# Patient Record
Sex: FEMALE | Race: WHITE | Employment: PART TIME | URBAN - NONMETROPOLITAN AREA
[De-identification: names, ages, dates, MRNs, and addresses within clinical notes are randomized per-mention and may not be internally consistent; named-entity substitution may affect disease eponyms.]

---

## 2017-01-09 DIAGNOSIS — I10 ESSENTIAL HYPERTENSION: ICD-10-CM

## 2017-01-09 RX ORDER — HYDROCHLOROTHIAZIDE 12.5 MG/1
12.5 TABLET ORAL DAILY
Qty: 90 TABLET | Refills: 3 | Status: SHIPPED | OUTPATIENT
Start: 2017-01-09 | End: 2017-12-29 | Stop reason: SDUPTHER

## 2017-01-09 RX ORDER — METOPROLOL SUCCINATE 50 MG/1
50 TABLET, EXTENDED RELEASE ORAL DAILY
Qty: 90 TABLET | Refills: 3 | Status: SHIPPED | OUTPATIENT
Start: 2017-01-09 | End: 2017-01-10 | Stop reason: SDUPTHER

## 2017-01-10 DIAGNOSIS — I10 ESSENTIAL HYPERTENSION: ICD-10-CM

## 2017-01-10 RX ORDER — METOPROLOL SUCCINATE 50 MG/1
50 TABLET, EXTENDED RELEASE ORAL DAILY
Qty: 90 TABLET | Refills: 3 | Status: SHIPPED | OUTPATIENT
Start: 2017-01-10 | End: 2017-12-29 | Stop reason: SDUPTHER

## 2017-01-11 DIAGNOSIS — E03.9 ACQUIRED HYPOTHYROIDISM: ICD-10-CM

## 2017-01-11 RX ORDER — LEVOTHYROXINE SODIUM 0.05 MG/1
TABLET ORAL
Qty: 90 TABLET | Refills: 3 | Status: SHIPPED | OUTPATIENT
Start: 2017-01-11 | End: 2017-01-23 | Stop reason: SDUPTHER

## 2017-01-23 DIAGNOSIS — E03.9 ACQUIRED HYPOTHYROIDISM: ICD-10-CM

## 2017-01-23 RX ORDER — LEVOTHYROXINE SODIUM 0.05 MG/1
TABLET ORAL
Qty: 90 TABLET | Refills: 3 | Status: SHIPPED | OUTPATIENT
Start: 2017-01-23 | End: 2017-12-29 | Stop reason: SDUPTHER

## 2017-01-30 ENCOUNTER — OFFICE VISIT (OUTPATIENT)
Dept: PRIMARY CARE CLINIC | Age: 22
End: 2017-01-30
Payer: COMMERCIAL

## 2017-01-30 VITALS
SYSTOLIC BLOOD PRESSURE: 136 MMHG | DIASTOLIC BLOOD PRESSURE: 82 MMHG | BODY MASS INDEX: 43.71 KG/M2 | HEIGHT: 64 IN | OXYGEN SATURATION: 98 % | TEMPERATURE: 97 F | WEIGHT: 256 LBS | HEART RATE: 70 BPM

## 2017-01-30 DIAGNOSIS — R50.9 FEVER, UNSPECIFIED FEVER CAUSE: ICD-10-CM

## 2017-01-30 DIAGNOSIS — J01.10 ACUTE NON-RECURRENT FRONTAL SINUSITIS: Primary | ICD-10-CM

## 2017-01-30 LAB — S PYO AG THROAT QL: NORMAL

## 2017-01-30 PROCEDURE — 99213 OFFICE O/P EST LOW 20 MIN: CPT | Performed by: NURSE PRACTITIONER

## 2017-01-30 PROCEDURE — 87880 STREP A ASSAY W/OPTIC: CPT | Performed by: NURSE PRACTITIONER

## 2017-01-30 RX ORDER — CEFDINIR 300 MG/1
300 CAPSULE ORAL 2 TIMES DAILY
Qty: 20 CAPSULE | Refills: 0 | Status: SHIPPED | OUTPATIENT
Start: 2017-01-30 | End: 2017-02-09

## 2017-01-30 ASSESSMENT — ENCOUNTER SYMPTOMS
SORE THROAT: 1
RHINORRHEA: 1
COUGH: 1
CONSTIPATION: 0
EYE REDNESS: 0
DIARRHEA: 0
BLOOD IN STOOL: 0
TROUBLE SWALLOWING: 0
WHEEZING: 0
EYE DISCHARGE: 0
ABDOMINAL PAIN: 0

## 2017-02-27 ENCOUNTER — OFFICE VISIT (OUTPATIENT)
Dept: PRIMARY CARE CLINIC | Age: 22
End: 2017-02-27
Payer: COMMERCIAL

## 2017-02-27 VITALS
OXYGEN SATURATION: 98 % | WEIGHT: 249.3 LBS | BODY MASS INDEX: 42.56 KG/M2 | HEIGHT: 64 IN | TEMPERATURE: 97.3 F | HEART RATE: 92 BPM | DIASTOLIC BLOOD PRESSURE: 90 MMHG | SYSTOLIC BLOOD PRESSURE: 138 MMHG

## 2017-02-27 DIAGNOSIS — J01.00 ACUTE NON-RECURRENT MAXILLARY SINUSITIS: ICD-10-CM

## 2017-02-27 DIAGNOSIS — J02.9 SORE THROAT: Primary | ICD-10-CM

## 2017-02-27 LAB — S PYO AG THROAT QL: NORMAL

## 2017-02-27 PROCEDURE — 99213 OFFICE O/P EST LOW 20 MIN: CPT | Performed by: PEDIATRICS

## 2017-02-27 PROCEDURE — 87880 STREP A ASSAY W/OPTIC: CPT | Performed by: PEDIATRICS

## 2017-02-27 RX ORDER — CEFDINIR 300 MG/1
300 CAPSULE ORAL 2 TIMES DAILY
Qty: 20 CAPSULE | Refills: 0 | Status: SHIPPED | OUTPATIENT
Start: 2017-02-27 | End: 2017-03-09

## 2017-02-27 RX ORDER — PSEUDOEPHEDRINE HCL 120 MG/1
120 TABLET, FILM COATED, EXTENDED RELEASE ORAL EVERY 12 HOURS
COMMUNITY
End: 2019-03-14 | Stop reason: ALTCHOICE

## 2017-02-27 ASSESSMENT — ENCOUNTER SYMPTOMS
CONSTIPATION: 0
EYE DISCHARGE: 0
EYE PAIN: 0
VOICE CHANGE: 0
BACK PAIN: 0
DIARRHEA: 0
COUGH: 1
ABDOMINAL PAIN: 0
VOMITING: 0
WHEEZING: 0
SORE THROAT: 1
NAUSEA: 0
SINUS PRESSURE: 0
SHORTNESS OF BREATH: 0

## 2017-05-22 DIAGNOSIS — Z30.8 ENCOUNTER FOR OTHER CONTRACEPTIVE MANAGEMENT: ICD-10-CM

## 2017-05-24 DIAGNOSIS — J30.9 ALLERGIC RHINITIS: ICD-10-CM

## 2017-05-24 RX ORDER — LORATADINE 10 MG/1
10 TABLET ORAL DAILY
Qty: 30 TABLET | Refills: 3 | Status: SHIPPED | OUTPATIENT
Start: 2017-05-24

## 2017-10-23 RX ORDER — ATORVASTATIN CALCIUM 40 MG/1
40 TABLET, FILM COATED ORAL NIGHTLY
Qty: 30 TABLET | Refills: 11 | Status: SHIPPED | OUTPATIENT
Start: 2017-10-23 | End: 2017-12-29 | Stop reason: SDUPTHER

## 2017-10-23 NOTE — TELEPHONE ENCOUNTER
Received request from pt for refill on the following medication. Pt was last seen in the office on 2/27/17 and has no follow up appointment scheduled. Will send request to physician for authorization.        Requested Prescriptions     Pending Prescriptions Disp Refills    atorvastatin (LIPITOR) 40 MG tablet [Pharmacy Med Name: ATORVASTATIN 40MG   TAB] 30 tablet 11     Sig: Take 1 tablet by mouth nightly

## 2017-11-16 DIAGNOSIS — Z30.8 ENCOUNTER FOR OTHER CONTRACEPTIVE MANAGEMENT: ICD-10-CM

## 2017-12-29 ENCOUNTER — OFFICE VISIT (OUTPATIENT)
Dept: PRIMARY CARE CLINIC | Age: 22
End: 2017-12-29
Payer: COMMERCIAL

## 2017-12-29 VITALS
HEART RATE: 89 BPM | DIASTOLIC BLOOD PRESSURE: 100 MMHG | HEIGHT: 64 IN | TEMPERATURE: 97.8 F | WEIGHT: 255 LBS | SYSTOLIC BLOOD PRESSURE: 150 MMHG | OXYGEN SATURATION: 98 % | BODY MASS INDEX: 43.54 KG/M2

## 2017-12-29 DIAGNOSIS — I10 ESSENTIAL HYPERTENSION: ICD-10-CM

## 2017-12-29 DIAGNOSIS — J02.9 PHARYNGITIS, UNSPECIFIED ETIOLOGY: ICD-10-CM

## 2017-12-29 DIAGNOSIS — Z00.00 ROUTINE GENERAL MEDICAL EXAMINATION AT A HEALTH CARE FACILITY: Primary | ICD-10-CM

## 2017-12-29 DIAGNOSIS — J02.0 STREP THROAT: ICD-10-CM

## 2017-12-29 DIAGNOSIS — E03.9 ACQUIRED HYPOTHYROIDISM: ICD-10-CM

## 2017-12-29 DIAGNOSIS — M79.672 FOOT PAIN, LEFT: ICD-10-CM

## 2017-12-29 DIAGNOSIS — Z30.8 ENCOUNTER FOR OTHER CONTRACEPTIVE MANAGEMENT: ICD-10-CM

## 2017-12-29 DIAGNOSIS — E78.2 MIXED HYPERLIPIDEMIA: ICD-10-CM

## 2017-12-29 LAB — S PYO AG THROAT QL: POSITIVE

## 2017-12-29 PROCEDURE — 87880 STREP A ASSAY W/OPTIC: CPT | Performed by: NURSE PRACTITIONER

## 2017-12-29 PROCEDURE — 99395 PREV VISIT EST AGE 18-39: CPT | Performed by: NURSE PRACTITIONER

## 2017-12-29 RX ORDER — MELOXICAM 15 MG/1
15 TABLET ORAL DAILY
Qty: 90 TABLET | Refills: 3 | Status: SHIPPED | OUTPATIENT
Start: 2017-12-29 | End: 2018-12-13 | Stop reason: SDUPTHER

## 2017-12-29 RX ORDER — ATORVASTATIN CALCIUM 40 MG/1
40 TABLET, FILM COATED ORAL NIGHTLY
Qty: 30 TABLET | Refills: 11 | Status: SHIPPED | OUTPATIENT
Start: 2017-12-29 | End: 2018-12-13 | Stop reason: SDUPTHER

## 2017-12-29 RX ORDER — HYDROCHLOROTHIAZIDE 12.5 MG/1
12.5 TABLET ORAL DAILY
Qty: 90 TABLET | Refills: 3 | Status: SHIPPED | OUTPATIENT
Start: 2017-12-29 | End: 2018-12-13 | Stop reason: SDUPTHER

## 2017-12-29 RX ORDER — AMOXICILLIN 500 MG/1
500 CAPSULE ORAL 3 TIMES DAILY
Qty: 30 CAPSULE | Refills: 0 | Status: SHIPPED | OUTPATIENT
Start: 2017-12-29 | End: 2018-01-08

## 2017-12-29 RX ORDER — METOPROLOL SUCCINATE 50 MG/1
50 TABLET, EXTENDED RELEASE ORAL DAILY
Qty: 90 TABLET | Refills: 3 | Status: SHIPPED | OUTPATIENT
Start: 2017-12-29 | End: 2018-11-21 | Stop reason: SDUPTHER

## 2017-12-29 RX ORDER — LEVOTHYROXINE SODIUM 0.05 MG/1
TABLET ORAL
Qty: 90 TABLET | Refills: 3 | Status: SHIPPED | OUTPATIENT
Start: 2017-12-29 | End: 2018-12-13 | Stop reason: SDUPTHER

## 2017-12-29 ASSESSMENT — ENCOUNTER SYMPTOMS
COUGH: 1
CONSTIPATION: 0
SHORTNESS OF BREATH: 0
SINUS PRESSURE: 1
RHINORRHEA: 0
DIARRHEA: 0
VOMITING: 0
SORE THROAT: 1
EYE REDNESS: 0

## 2017-12-29 NOTE — PROGRESS NOTES
complaints   States that she recently had sinus infection and was treated with abx from [facility] (finished Sunday, Augmentin 875)   Yellow/green snot from nose   Tylenol for fever-last dose this AM      height is 5' 4\" (1.626 m) and weight is 255 lb (115.7 kg). Her temporal temperature is 97.8 °F (36.6 °C). Her blood pressure is 150/100 (abnormal) and her pulse is 89. Her oxygen saturation is 98%. Body mass index is 43.77 kg/m². Results for orders placed or performed in visit on 02/27/17   POCT rapid strep A   Result Value Ref Range    Strep A Ag None Detected None Detected       I have reviewed the following with the Ms. Myranda Marquez   Lab Review   No visits with results within 6 Month(s) from this visit. Latest known visit with results is:   Office Visit on 02/27/2017   Component Date Value    Strep A Ag 02/27/2017 None Detected      Copies of these are in the chart. Prior to Visit Medications    Medication Sig Taking?  Authorizing Provider   norethindrone-ethinyl estradiol (PIRMELLA 7/7/7) 0.5/0.75/1-35 MG-MCG per tablet Take 1 tablet by mouth daily Yes JUAN Mills   atorvastatin (LIPITOR) 40 MG tablet Take 1 tablet by mouth nightly Yes JUAN Mills   levothyroxine (SYNTHROID) 50 MCG tablet TAKE ONE TABLET BY MOUTH ONCE DAILY Yes JUAN Mills   metoprolol succinate (TOPROL XL) 50 MG extended release tablet Take 1 tablet by mouth daily Yes JUAN Mills   hydrochlorothiazide (HYDRODIURIL) 12.5 MG tablet Take 1 tablet by mouth daily Yes JUAN Mills   meloxicam (MOBIC) 15 MG tablet Take 1 tablet by mouth daily Yes JUAN Mills   amoxicillin (AMOXIL) 500 MG capsule Take 1 capsule by mouth 3 times daily for 10 days Yes JUAN Mills   loratadine (EQ ALLERGY RELIEF) 10 MG tablet Take 1 tablet by mouth daily Yes JUAN Jackson   pseudoephedrine (SUDAFED 12 HR) 120 MG extended release tablet Take 120 mg by mouth every 12 hours Yes Historical Provider, MD adenopathy. Neurological: She is alert. Skin: Skin is warm and dry. No rash noted. No erythema. No pallor. Psychiatric: She has a normal mood and affect. Her behavior is normal. Judgment and thought content normal.   Vitals reviewed. ASSESSMENT      ICD-10-CM ICD-9-CM    1. Routine general medical examination at a health care facility Z00.00 V70.0 Lipid Panel      TSH without Reflex      T4, Free      CBC Auto Differential      Microalbumin / Creatinine Urine Ratio      Comprehensive Metabolic Panel   2. Encounter for other contraceptive management Z30.8 V25.8 norethindrone-ethinyl estradiol (31 Ruaddison SchneiderJolanta 7/7/7) 0.5/0.75/1-35 MG-MCG per tablet   3. Acquired hypothyroidism E03.9 244.9 levothyroxine (SYNTHROID) 50 MCG tablet      TSH without Reflex      T4, Free      CBC Auto Differential   4. Essential hypertension I10 401.9 metoprolol succinate (TOPROL XL) 50 MG extended release tablet      hydrochlorothiazide (HYDRODIURIL) 12.5 MG tablet      TSH without Reflex      T4, Free      CBC Auto Differential      Microalbumin / Creatinine Urine Ratio      Comprehensive Metabolic Panel   5. Foot pain, left M79.672 729.5 meloxicam (MOBIC) 15 MG tablet   6. Pharyngitis, unspecified etiology J02.9 462 POCT rapid strep A   7. Mixed hyperlipidemia E78.2 272.2 atorvastatin (LIPITOR) 40 MG tablet  (patient aware that this medication is tetragenic)      Lipid Panel      CBC Auto Differential      Comprehensive Metabolic Panel   8. Strep throat J02.0 034.0 amoxicillin (AMOXIL) 500 MG capsule         PLAN    Orders Placed This Encounter   Procedures    Lipid Panel    TSH without Reflex    T4, Free    CBC Auto Differential    Microalbumin / Creatinine Urine Ratio    Comprehensive Metabolic Panel    POCT rapid strep A        Return in about 6 months (around 6/29/2018), or if symptoms worsen or fail to improve.      Patient Instructions     Patient Education        Learning About Birth Control: Combination Pills  What are every day to prevent pregnancy. · Combination pills don't protect against sexually transmitted infections (STIs), such as herpes or HIV/AIDS. If you aren't sure if your sex partner might have an STI, use a condom to protect against disease. · They may cause changes in your period. You may have little bleeding, skipped periods, or spotting. · They may cause mood changes, less interest in sex, or weight gain. · Combination pills contain estrogen. They may not be right for you if you have certain health problems. Where can you learn more? Go to https://WooWhopeCamioCameb.healthW5 Networks. org and sign in to your [employer] account. Enter S290 in the hhgregg box to learn more about \"Learning About Birth Control: Combination Pills. \"     If you do not have an account, please click on the \"Sign Up Now\" link. Current as of: March 16, 2017  Content Version: 11.4  © 7446-0876 Caption Data. Care instructions adapted under license by [facility] ([facility]). If you have questions about a medical condition or this instruction, always ask your healthcare professional. [place] [address] any warranty or liability for your use of this information. Controlled Substances Monitoring:  n/a            Additional Instructions: As always, patient is advised to bring in medication bottles in order to correctly reconcile with our current list.    Parisa Garcia received counseling on the following healthy behaviors: n/a    Patient given educational materials on dx    I have instructed Parisa Garcia to complete a self tracking handout on n/a and instructed them to bring it with them to her next appointment. Discussed use, benefit, and side effects of prescribed medications. Barriers to medication compliance addressed. All patient questions answered. Pt voiced understanding.      JUAN Parsons

## 2017-12-29 NOTE — PATIENT INSTRUCTIONS
Patient Education        Learning About Birth Control: Combination Pills  What are combination pills? Combination pills are used to prevent pregnancy. Most people call them \"the pill. \"  Combination pills release a regular dose of two hormones, estrogen and progestin. They prevent pregnancy in a few ways. They thicken the mucus in the cervix. This makes it hard for sperm to travel into the uterus. And they thin the lining of the uterus. This makes it harder for a fertilized egg to attach to the uterus. The hormones also can stop the ovaries from releasing an egg each month (ovulation). You have to take a pill every day to prevent pregnancy. The packages for these pills are different. The most common one has 3 weeks of hormone pills and 1 week of sugar pills. The sugar pills don't contain any hormones. You have your period on that week. But other packs have no sugar pills. If you take hormone pills for the whole month, you will not get your period as often. Or you may not get it at all. How well do they work? In the first year of use:  · When combination pills are taken exactly as directed, fewer than 1 woman out of 100 has an unplanned pregnancy. · When pills are not taken exactly as directed, such as forgetting to take them sometimes, 9 women out of 100 have an unplanned pregnancy. Be sure to tell your doctor about any health problems you have or medicines you take. He or she can help you choose the birth control method that is right for you. What are the advantages of combination pills? · These pills work better than barrier methods. Barrier methods include condoms and diaphragms. · They may reduce acne and heavy bleeding. They may also reduce cramping and other symptoms of PMS (premenstrual syndrome). · The pills let you control your periods. You can have periods every month or every few months. Or you can choose not to have them at all. · You don't have to interrupt sex to use the pills.   What are the disadvantages of combination pills? · You have to take a pill at the same time every day to prevent pregnancy. · Combination pills don't protect against sexually transmitted infections (STIs), such as herpes or HIV/AIDS. If you aren't sure if your sex partner might have an STI, use a condom to protect against disease. · They may cause changes in your period. You may have little bleeding, skipped periods, or spotting. · They may cause mood changes, less interest in sex, or weight gain. · Combination pills contain estrogen. They may not be right for you if you have certain health problems. Where can you learn more? Go to https://Done.pepiceweb.healthIDMissionpartners. org and sign in to your Ohmconnect account. Enter O910 in the [employer] box to learn more about \"Learning About Birth Control: Combination Pills. \"     If you do not have an account, please click on the \"Sign Up Now\" link. Current as of: March 16, 2017  Content Version: 11.4  © 8729-3975 Healthwise, Incorporated. Care instructions adapted under license by [facility] ([facility]). If you have questions about a medical condition or this instruction, always ask your healthcare professional. [place] [address] any warranty or liability for your use of this information.

## 2018-02-21 DIAGNOSIS — Z30.8 ENCOUNTER FOR OTHER CONTRACEPTIVE MANAGEMENT: ICD-10-CM

## 2018-07-13 ENCOUNTER — OFFICE VISIT (OUTPATIENT)
Dept: PRIMARY CARE CLINIC | Age: 23
End: 2018-07-13
Payer: COMMERCIAL

## 2018-07-13 VITALS
HEIGHT: 64 IN | TEMPERATURE: 97.3 F | HEART RATE: 104 BPM | WEIGHT: 258.13 LBS | SYSTOLIC BLOOD PRESSURE: 148 MMHG | DIASTOLIC BLOOD PRESSURE: 95 MMHG | OXYGEN SATURATION: 98 % | BODY MASS INDEX: 44.07 KG/M2

## 2018-07-13 DIAGNOSIS — E78.2 MIXED HYPERLIPIDEMIA: ICD-10-CM

## 2018-07-13 DIAGNOSIS — Z30.41 ENCOUNTER FOR SURVEILLANCE OF CONTRACEPTIVE PILLS: ICD-10-CM

## 2018-07-13 DIAGNOSIS — E03.9 ACQUIRED HYPOTHYROIDISM: ICD-10-CM

## 2018-07-13 DIAGNOSIS — I10 ESSENTIAL HYPERTENSION: Primary | ICD-10-CM

## 2018-07-13 PROCEDURE — 99213 OFFICE O/P EST LOW 20 MIN: CPT | Performed by: NURSE PRACTITIONER

## 2018-07-13 ASSESSMENT — ENCOUNTER SYMPTOMS
SHORTNESS OF BREATH: 0
RHINORRHEA: 0
COUGH: 0
CONSTIPATION: 0
VOMITING: 0
EYE REDNESS: 0
SINUS PRESSURE: 0
SORE THROAT: 0
DIARRHEA: 0

## 2018-07-13 ASSESSMENT — PATIENT HEALTH QUESTIONNAIRE - PHQ9
2. FEELING DOWN, DEPRESSED OR HOPELESS: 1
SUM OF ALL RESPONSES TO PHQ QUESTIONS 1-9: 1
SUM OF ALL RESPONSES TO PHQ9 QUESTIONS 1 & 2: 1
1. LITTLE INTEREST OR PLEASURE IN DOING THINGS: 0

## 2018-07-13 NOTE — PROGRESS NOTES
[address]  [place], [address]  Phone [phone]   Fax [phone]      OFFICE VISIT: 7/13/2018    [address]: 1995    Chief Complaint:  Micheal Wallace is a 24 y.o. female who is here for 6 Month Follow-Up     HPI  The patient presents today for 6 month follow-up. She has not gotten her labs drawn that were ordered 6 months ago. BP:  At home her BP was 128/78 today. Typically it runs in that range at home. She continues on Toprol XL 50 mg and HCTZ 12.5 mg daily. She is tolerating this regimen. HYPERLIPIDEMIA:  She is tolerating Lipitor 40 mg without side effects. She has not gotten her lipid profile re-drawn thus far. BIRTH CONTROL:  Denies any break through bleeding. Reports normal periods. She is tolerating without difficulty. HYPOTHYROIDISM:  She continues on Synthroid 50 mcg daily. She has not rechecked her TSH or Free T4.     height is 5' 4\" (1.626 m) and weight is 258 lb 2 oz (117.1 kg). Her temporal temperature is 97.3 °F (36.3 °C). Her blood pressure is 148/95 (abnormal) and her pulse is 104. Her oxygen saturation is 98%. Body mass index is 44.31 kg/m². Results for orders placed or performed in visit on 12/29/17   POCT rapid strep A   Result Value Ref Range    Strep A Ag Positive (A) None Detected       I have reviewed the following with the MsJaime Aram Obregon   Lab Review   No visits with results within 6 Month(s) from this visit. Latest known visit with results is:   Office Visit on 12/29/2017   Component Date Value    Strep A Ag 12/29/2017 Positive*     Copies of these are in the chart. Prior to Visit Medications    Medication Sig Taking?  Authorizing Provider   norethindrone-ethinyl estradiol (Kit Stover 7/7/7) 0.5/0.75/1-35 MG-MCG per tablet Take 1 tablet by mouth daily Yes PATRIA Sanders,    atorvastatin (LIPITOR) 40 MG tablet Take 1 tablet by mouth nightly Yes JUAN Mills   levothyroxine (SYNTHROID) 50 MCG tablet TAKE ONE TABLET BY MOUTH ONCE DAILY Yes JUAN Mills   metoprolol succinate (TOPROL XL) 50 MG extended release tablet Take 1 tablet by mouth daily Yes MariolaJUAN Shea   hydrochlorothiazide (HYDRODIURIL) 12.5 MG tablet Take 1 tablet by mouth daily Yes MariolaJUAN Shea   meloxicam (MOBIC) 15 MG tablet Take 1 tablet by mouth daily Yes MariolaJUAN Shea   loratadine (EQ ALLERGY RELIEF) 10 MG tablet Take 1 tablet by mouth daily Yes JUAN Hurtado   pseudoephedrine (SUDAFED 12 HR) 120 MG extended release tablet Take 120 mg by mouth every 12 hours Yes Historical Provider, MD       Allergies: Patient has no known allergies. Past Medical History:   Diagnosis Date    Allergic rhinitis     Hypertension     Hypothyroidism        Past Surgical History:   Procedure Laterality Date    CYST REMOVAL         Social History   Substance Use Topics    Smoking status: Never Smoker    Smokeless tobacco: Never Used    Alcohol use No       Review of Systems   Constitutional: Negative for chills, fatigue and fever. HENT: Negative for congestion, ear pain, rhinorrhea, sinus pressure and sore throat. Eyes: Negative for redness. Respiratory: Negative for cough and shortness of breath. Cardiovascular: Negative for chest pain. Gastrointestinal: Negative for constipation, diarrhea and vomiting. Genitourinary: Negative for dysuria and urgency. Skin: Negative for rash. Neurological: Negative for dizziness and headaches. Physical Exam   Constitutional: She appears well-developed. Overweight   HENT:   Head: Normocephalic. Right Ear: Tympanic membrane and external ear normal.   Left Ear: Tympanic membrane and external ear normal.   Nose: Nose normal.   Mouth/Throat: Oropharynx is clear and moist.   Neck: Normal range of motion. Carotid bruit is not present. Cardiovascular: Normal rate and regular rhythm. Pulmonary/Chest: Effort normal and breath sounds normal. No respiratory distress. She has no wheezes. She has no rales. stroke. These may include:  ¨ Sudden numbness, tingling, weakness, or loss of movement in your face, arm, or leg, especially on only one side of your body. ¨ Sudden vision changes. ¨ Sudden trouble speaking. ¨ Sudden confusion or trouble understanding simple statements. ¨ Sudden problems with walking or balance. ¨ A sudden, severe headache that is different from past headaches.     · You have severe back or belly pain.    Do not wait until your blood pressure comes down on its own. Get help right away.   Call your doctor now or seek immediate care if:    · Your blood pressure is much higher than normal (such as 180/110 or higher), but you don't have symptoms.     · You think high blood pressure is causing symptoms, such as:  ¨ Severe headache. ¨ Blurry vision.    Watch closely for changes in your health, and be sure to contact your doctor if:    · Your blood pressure measures 140/90 or higher at least 2 times. That means the top number is 140 or higher or the bottom number is 90 or higher, or both.     · You think you may be having side effects from your blood pressure medicine.     · Your blood pressure is usually normal, but it goes above normal at least 2 times. Where can you learn more? Go to https://Perfect Audience.SwipeToSpin. org and sign in to your AppSheet account. Enter Q624 in the [employer] box to learn more about \"High Blood Pressure: Care Instructions. \"     If you do not have an account, please click on the \"Sign Up Now\" link. Current as of: December 6, 2017  Content Version: 11.6  © 3203-8303 Agilence, [email or web address]. Care instructions adapted under license by [facility] ([facility]). If you have questions about a medical condition or this instruction, always ask your healthcare professional. [place] [address] any warranty or liability for your use of this information.                      Controlled Substances Monitoring:  n/a            Additional Instructions: As always, patient is advised to bring in medication bottles in order to correctly reconcile with our current list.    [facility] received counseling on the following healthy behaviors: n/a    Patient given educational materials on dx    I have instructed [facility] to complete a self tracking handout on n/a and instructed them to bring it with them to her next appointment. Discussed use, benefit, and side effects of prescribed medications. Barriers to medication compliance addressed. All patient questions answered. Pt voiced understanding.      JUAN Denis

## 2018-07-26 ENCOUNTER — TELEPHONE (OUTPATIENT)
Dept: PRIMARY CARE CLINIC | Age: 23
End: 2018-07-26

## 2018-07-26 DIAGNOSIS — E03.9 ACQUIRED HYPOTHYROIDISM: ICD-10-CM

## 2018-07-26 DIAGNOSIS — Z00.00 ROUTINE GENERAL MEDICAL EXAMINATION AT A HEALTH CARE FACILITY: ICD-10-CM

## 2018-07-26 DIAGNOSIS — I10 ESSENTIAL HYPERTENSION: ICD-10-CM

## 2018-07-26 DIAGNOSIS — E78.2 MIXED HYPERLIPIDEMIA: ICD-10-CM

## 2018-07-26 LAB
ALBUMIN SERPL-MCNC: 4.3 G/DL (ref 3.5–5.2)
ALP BLD-CCNC: 48 U/L (ref 35–104)
ALT SERPL-CCNC: 20 U/L (ref 5–33)
ANION GAP SERPL CALCULATED.3IONS-SCNC: 17 MMOL/L (ref 7–19)
AST SERPL-CCNC: 15 U/L (ref 5–32)
BASOPHILS ABSOLUTE: 0 K/UL (ref 0–0.2)
BASOPHILS RELATIVE PERCENT: 0.5 % (ref 0–1)
BILIRUB SERPL-MCNC: 0.3 MG/DL (ref 0.2–1.2)
BUN BLDV-MCNC: 19 MG/DL (ref 6–20)
CALCIUM SERPL-MCNC: 9.4 MG/DL (ref 8.6–10)
CHLORIDE BLD-SCNC: 102 MMOL/L (ref 98–111)
CHOLESTEROL, TOTAL: 155 MG/DL (ref 160–199)
CO2: 23 MMOL/L (ref 22–29)
CREAT SERPL-MCNC: 0.7 MG/DL (ref 0.5–0.9)
EOSINOPHILS ABSOLUTE: 0.4 K/UL (ref 0–0.6)
EOSINOPHILS RELATIVE PERCENT: 4.9 % (ref 0–5)
GFR NON-AFRICAN AMERICAN: >60
GLUCOSE BLD-MCNC: 102 MG/DL (ref 74–109)
HCT VFR BLD CALC: 40.6 % (ref 37–47)
HDLC SERPL-MCNC: 66 MG/DL (ref 65–121)
HEMOGLOBIN: 13.2 G/DL (ref 12–16)
LDL CHOLESTEROL CALCULATED: 66 MG/DL
LYMPHOCYTES ABSOLUTE: 3.1 K/UL (ref 1.1–4.5)
LYMPHOCYTES RELATIVE PERCENT: 35.2 % (ref 20–40)
MCH RBC QN AUTO: 28.7 PG (ref 27–31)
MCHC RBC AUTO-ENTMCNC: 32.5 G/DL (ref 33–37)
MCV RBC AUTO: 88.3 FL (ref 81–99)
MONOCYTES ABSOLUTE: 0.5 K/UL (ref 0–0.9)
MONOCYTES RELATIVE PERCENT: 5.8 % (ref 0–10)
NEUTROPHILS ABSOLUTE: 4.6 K/UL (ref 1.5–7.5)
NEUTROPHILS RELATIVE PERCENT: 52.9 % (ref 50–65)
PDW BLD-RTO: 12.8 % (ref 11.5–14.5)
PLATELET # BLD: 219 K/UL (ref 130–400)
PMV BLD AUTO: 11.2 FL (ref 9.4–12.3)
POTASSIUM SERPL-SCNC: 4.5 MMOL/L (ref 3.5–5)
RBC # BLD: 4.6 M/UL (ref 4.2–5.4)
SODIUM BLD-SCNC: 142 MMOL/L (ref 136–145)
T4 FREE: 1 NG/DL (ref 0.9–1.7)
TOTAL PROTEIN: 7.1 G/DL (ref 6.6–8.7)
TRIGL SERPL-MCNC: 113 MG/DL (ref 0–149)
TSH SERPL DL<=0.05 MIU/L-ACNC: 2.43 UIU/ML (ref 0.27–4.2)
WBC # BLD: 8.8 K/UL (ref 4.8–10.8)

## 2018-07-26 NOTE — TELEPHONE ENCOUNTER
----- Message from JUAN Edward sent at 7/26/2018  1:52 PM CDT -----  Please notify patient of the following  Thyroid is within normal limits  Cholesterol is much improved. Continue Lipitor. LDL is now 66. This is great news. Total cholesterol is down to 155. It was previously 270. Again great news. CMP: Within normal limits. This includes normal electrolytes, kidney function and liver function  CBC: Within normal limits.  There is no evidence of infection or anemia

## 2018-08-23 ENCOUNTER — OFFICE VISIT (OUTPATIENT)
Dept: PRIMARY CARE CLINIC | Age: 23
End: 2018-08-23
Payer: COMMERCIAL

## 2018-08-23 ENCOUNTER — TELEPHONE (OUTPATIENT)
Dept: PRIMARY CARE CLINIC | Age: 23
End: 2018-08-23

## 2018-08-23 ENCOUNTER — HOSPITAL ENCOUNTER (OUTPATIENT)
Dept: GENERAL RADIOLOGY | Age: 23
Discharge: HOME | End: 2018-08-23
Payer: COMMERCIAL

## 2018-08-23 VITALS
DIASTOLIC BLOOD PRESSURE: 95 MMHG | BODY MASS INDEX: 43.02 KG/M2 | TEMPERATURE: 97.6 F | HEART RATE: 104 BPM | HEIGHT: 64 IN | SYSTOLIC BLOOD PRESSURE: 164 MMHG | OXYGEN SATURATION: 98 % | WEIGHT: 252 LBS

## 2018-08-23 DIAGNOSIS — R10.11 RUQ ABDOMINAL PAIN: ICD-10-CM

## 2018-08-23 DIAGNOSIS — I10 ESSENTIAL HYPERTENSION: ICD-10-CM

## 2018-08-23 DIAGNOSIS — R19.8 POSITIVE MURPHY'S SIGN: ICD-10-CM

## 2018-08-23 DIAGNOSIS — R10.10 PAIN OF UPPER ABDOMEN: Primary | ICD-10-CM

## 2018-08-23 DIAGNOSIS — R10.10 PAIN OF UPPER ABDOMEN: ICD-10-CM

## 2018-08-23 DIAGNOSIS — R10.11 ABDOMINAL PAIN, RUQ (RIGHT UPPER QUADRANT): Primary | ICD-10-CM

## 2018-08-23 PROCEDURE — 99213 OFFICE O/P EST LOW 20 MIN: CPT | Performed by: NURSE PRACTITIONER

## 2018-08-23 PROCEDURE — 76705 ECHO EXAM OF ABDOMEN: CPT

## 2018-08-23 ASSESSMENT — ENCOUNTER SYMPTOMS
BLOOD IN STOOL: 0
CONSTIPATION: 0
RHINORRHEA: 0
SHORTNESS OF BREATH: 0
EYE REDNESS: 0
ABDOMINAL PAIN: 1
COUGH: 0
NAUSEA: 0
VOMITING: 0
DIARRHEA: 0
SORE THROAT: 0

## 2018-08-23 NOTE — PATIENT INSTRUCTIONS
A serving is 1 slice of bread, 1 ounce of dry cereal, or ½ cup of cooked rice, pasta, or cooked cereal. Try to choose whole-grain products as much as possible. · Limit lean meat, poultry, and fish to 2 servings each day. A serving is 3 ounces, about the size of a deck of cards. · Eat 4 to 5 servings of nuts, seeds, and legumes (cooked dried beans, lentils, and split peas) each week. A serving is 1/3 cup of nuts, 2 tablespoons of seeds, or ½ cup of cooked beans or peas. · Limit fats and oils to 2 to 3 servings each day. A serving is 1 teaspoon of vegetable oil or 2 tablespoons of salad dressing. · Limit sweets and added sugars to 5 servings or less a week. A serving is 1 tablespoon jelly or jam, ½ cup sorbet, or 1 cup of lemonade. · Eat less than 2,300 milligrams (mg) of sodium a day. If you limit your sodium to 1,500 mg a day, you can lower your blood pressure even more. Tips for success  · Start small. Do not try to make dramatic changes to your diet all at once. You might feel that you are missing out on your favorite foods and then be more likely to not follow the plan. Make small changes, and stick with them. Once those changes become habit, add a few more changes. · Try some of the following:  ¨ Make it a goal to eat a fruit or vegetable at every meal and at snacks. This will make it easy to get the recommended amount of fruits and vegetables each day. ¨ Try yogurt topped with fruit and nuts for a snack or healthy dessert. ¨ Add lettuce, tomato, cucumber, and onion to sandwiches. ¨ Combine a ready-made pizza crust with low-fat mozzarella cheese and lots of vegetable toppings. Try using tomatoes, squash, spinach, broccoli, carrots, cauliflower, and onions. ¨ Have a variety of cut-up vegetables with a low-fat dip as an appetizer instead of chips and dip. ¨ Sprinkle sunflower seeds or chopped almonds over salads. Or try adding chopped walnuts or almonds to cooked vegetables.   ¨ Try some vegetarian meals using beans and peas. Add garbanzo or kidney beans to salads. Make burritos and tacos with mashed fernandez beans or black beans. Where can you learn more? Go to https://chquinneb.VTL Group. org and sign in to your A4 Datat account. Enter D724 in the Efficient Drivetrains box to learn more about \"DASH Diet: Care Instructions. \"     If you do not have an account, please click on the \"Sign Up Now\" link. Current as of: December 6, 2017  Content Version: 11.7  © 4093-9358 [employer], Incorporated. Care instructions adapted under license by [facility] ([facility]). If you have questions about a medical condition or this instruction, always ask your healthcare professional. [address] any warranty or liability for your use of this information.

## 2018-08-23 NOTE — PROGRESS NOTES
Tessy [address]  [facility], [address] [place] [address] Rd  Phone [phone]   Fax [phone]      OFFICE VISIT: 8/23/2018    [address]: 1995    Chief Complaint:  Jeffry Foreman is a 24 y.o. female who is here for Abdominal Pain (started last week)     HPI  The patient presents today for evaluation of abdominal pain. \"Everytime I eat something my stomach hurts. \"  This started last week. The pain is in the upper abdomen. Denies a fever. Denies N, V, or D. This is a constant pain over the last week, but it does wax and wane. \"A few hours after I eat, it feels better, then as soon as I eat it hurts again. \"     height is 5' 4\" (1.626 m) and weight is 252 lb (114.3 kg). Her temporal temperature is 97.6 °F (36.4 °C). Her blood pressure is 164/95 (abnormal) and her pulse is 104. Her oxygen saturation is 98%. Body mass index is 43.26 kg/m².     Results for orders placed or performed in visit on 07/26/18   Lipid Panel   Result Value Ref Range    Cholesterol, Total 155 (L) 160 - 199 mg/dL    Triglycerides 113 0 - 149 mg/dL    HDL 66 65 - 121 mg/dL    LDL Calculated 66 <100 mg/dL   TSH without Reflex   Result Value Ref Range    TSH 2.430 0.270 - 4.200 uIU/mL   T4, Free   Result Value Ref Range    T4 Free 1.0 0.9 - 1.7 ng/dL   CBC Auto Differential   Result Value Ref Range    WBC 8.8 4.8 - 10.8 K/uL    RBC 4.60 4.20 - 5.40 M/uL    Hemoglobin 13.2 12.0 - 16.0 g/dL    Hematocrit 40.6 37.0 - 47.0 %    MCV 88.3 81.0 - 99.0 fL    MCH 28.7 27.0 - 31.0 pg    MCHC 32.5 (L) 33.0 - 37.0 g/dL    RDW 12.8 11.5 - 14.5 %    Platelets 247 225 - 870 K/uL    MPV 11.2 9.4 - 12.3 fL    Neutrophils % 52.9 50.0 - 65.0 %    Lymphocytes % 35.2 20.0 - 40.0 %    Monocytes % 5.8 0.0 - 10.0 %    Eosinophils % 4.9 0.0 - 5.0 %    Basophils % 0.5 0.0 - 1.0 %    Neutrophils # 4.6 1.5 - 7.5 K/uL    Lymphocytes # 3.1 1.1 - 4.5 K/uL    Monocytes # 0.50 0.00 - 0.90 K/uL    Eosinophils # 0.40 0.00 - 0.60 K/uL    Basophils # 0.00 0.00 - 0.20 K/uL   Comprehensive chart.    Prior to Visit Medications    Medication Sig Taking? Authorizing Provider   norethindrone-ethinyl estradiol (Kit Stover 7/7/7) 0.5/0.75/1-35 MG-MCG per tablet Take 1 tablet by mouth daily Yes PATRIA Montes De Oca,    atorvastatin (LIPITOR) 40 MG tablet Take 1 tablet by mouth nightly Yes JUAN Mills   levothyroxine (SYNTHROID) 50 MCG tablet TAKE ONE TABLET BY MOUTH ONCE DAILY Yes JUAN Mills   metoprolol succinate (TOPROL XL) 50 MG extended release tablet Take 1 tablet by mouth daily Yes JUAN Mills   hydrochlorothiazide (HYDRODIURIL) 12.5 MG tablet Take 1 tablet by mouth daily Yes JUAN Mills   meloxicam (MOBIC) 15 MG tablet Take 1 tablet by mouth daily Yes JUAN Mills   loratadine (EQ ALLERGY RELIEF) 10 MG tablet Take 1 tablet by mouth daily Yes JUAN Johnson   pseudoephedrine (SUDAFED 12 HR) 120 MG extended release tablet Take 120 mg by mouth every 12 hours Yes Historical Provider, MD       Allergies: Patient has no known allergies. Past Medical History:   Diagnosis Date    Allergic rhinitis     Hypertension     Hypothyroidism        Past Surgical History:   Procedure Laterality Date    CYST REMOVAL         Social History   Substance Use Topics    Smoking status: Never Smoker    Smokeless tobacco: Never Used    Alcohol use No       Review of Systems   Constitutional: Negative for chills, fatigue and fever. HENT: Negative for congestion, ear pain, rhinorrhea and sore throat. Eyes: Negative for redness. Respiratory: Negative for cough and shortness of breath. Cardiovascular: Negative for chest pain. Gastrointestinal: Positive for abdominal pain. Negative for blood in stool, constipation, diarrhea, nausea and vomiting. Skin: Negative for rash. Neurological: Negative for dizziness and headaches. Physical Exam   Constitutional: She appears well-developed. HENT:   Head: Normocephalic.    Right Ear: Hearing and external ear normal.   Left Ear: Hearing and external ear normal.   Nose: Nose normal.   Mouth/Throat: Oropharynx is clear and moist.   Neck: Normal range of motion. Cardiovascular: Normal rate and regular rhythm. Pulmonary/Chest: Effort normal and breath sounds normal. No respiratory distress. She has no wheezes. She has no rales. Abdominal: Soft. Bowel sounds are normal. There is tenderness in the right upper quadrant, right lower quadrant and epigastric area. There is positive Whitfield's sign. Neurological: She is alert. Skin: Skin is dry. Vitals reviewed. ASSESSMENT      ICD-10-CM ICD-9-CM    1. Pain of upper abdomen R10.10 789.09 US Gallbladder Ruq  IF negative will proceed with HIDA scan  Recommend BRAT diet   2. Positive Whitfield's Sign R19.8 787.99 US Gallbladder Ruq   3. RUQ abdominal pain R10.11 789.01 US Gallbladder Ruq   4. Essential hypertension I10 401.9 Patient is asked to monitor BP at home or work, several times per month and return with written values at next office visit. PLAN    Orders Placed This Encounter   Procedures    US Gallbladder Ruq        Return in about 4 weeks (around 9/20/2018), or if symptoms worsen or fail to improve, for HTN. Patient Instructions     Patient Education        DASH Diet: Care Instructions  Your Care Instructions    The DASH diet is an eating plan that can help lower your blood pressure. DASH stands for Dietary Approaches to Stop Hypertension. Hypertension is high blood pressure. The DASH diet focuses on eating foods that are high in calcium, potassium, and magnesium. These nutrients can lower blood pressure. The foods that are highest in these nutrients are fruits, vegetables, low-fat dairy products, nuts, seeds, and legumes. But taking calcium, potassium, and magnesium supplements instead of eating foods that are high in those nutrients does not have the same effect. The DASH diet also includes whole grains, fish, and poultry.   The DASH diet is one liability for your use of this information. Controlled Substances Monitoring:  n/a            Additional Instructions: As always, patient is advised to bring in medication bottles in order to correctly reconcile with our current list.    Sander Stephons received counseling on the following healthy behaviors: n/a    Patient given educational materials on dx    I have instructed Sander Santiago to complete a self tracking handout on n/a and instructed them to bring it with them to her next appointment. Discussed use, benefit, and side effects of prescribed medications. Barriers to medication compliance addressed. All patient questions answered. Pt voiced understanding.      [facility], APRN

## 2018-08-30 ENCOUNTER — HOSPITAL ENCOUNTER (OUTPATIENT)
Dept: NUCLEAR MEDICINE | Age: 23
Discharge: HOME | End: 2018-09-01
Payer: COMMERCIAL

## 2018-08-30 ENCOUNTER — TELEPHONE (OUTPATIENT)
Dept: PRIMARY CARE CLINIC | Age: 23
End: 2018-08-30

## 2018-08-30 DIAGNOSIS — R10.11 ABDOMINAL PAIN, RUQ (RIGHT UPPER QUADRANT): ICD-10-CM

## 2018-08-30 PROCEDURE — A9537 TC99M MEBROFENIN: HCPCS | Performed by: PEDIATRICS

## 2018-08-30 PROCEDURE — 78226 HEPATOBILIARY SYSTEM IMAGING: CPT

## 2018-08-30 PROCEDURE — 3430000000 HC RX DIAGNOSTIC RADIOPHARMACEUTICAL: Performed by: PEDIATRICS

## 2018-08-30 RX ADMIN — MEBROFENIN 5 MILLICURIE: 45 INJECTION, POWDER, LYOPHILIZED, FOR SOLUTION INTRAVENOUS at 13:28

## 2018-08-31 ENCOUNTER — TELEPHONE (OUTPATIENT)
Dept: PRIMARY CARE CLINIC | Age: 23
End: 2018-08-31

## 2018-09-14 ENCOUNTER — OFFICE VISIT (OUTPATIENT)
Dept: PRIMARY CARE CLINIC | Age: 23
End: 2018-09-14
Payer: COMMERCIAL

## 2018-09-14 VITALS
TEMPERATURE: 97.5 F | BODY MASS INDEX: 42.68 KG/M2 | HEIGHT: 64 IN | DIASTOLIC BLOOD PRESSURE: 86 MMHG | HEART RATE: 102 BPM | WEIGHT: 250 LBS | OXYGEN SATURATION: 98 % | SYSTOLIC BLOOD PRESSURE: 148 MMHG

## 2018-09-14 DIAGNOSIS — R10.13 MIDEPIGASTRIC PAIN: Primary | ICD-10-CM

## 2018-09-14 DIAGNOSIS — I10 ESSENTIAL HYPERTENSION: ICD-10-CM

## 2018-09-14 DIAGNOSIS — R10.10 PAIN OF UPPER ABDOMEN: ICD-10-CM

## 2018-09-14 PROCEDURE — 99213 OFFICE O/P EST LOW 20 MIN: CPT | Performed by: NURSE PRACTITIONER

## 2018-09-14 RX ORDER — PANTOPRAZOLE SODIUM 40 MG/1
40 TABLET, DELAYED RELEASE ORAL DAILY
Qty: 30 TABLET | Refills: 3 | Status: SHIPPED | OUTPATIENT
Start: 2018-09-14 | End: 2018-11-28 | Stop reason: SDUPTHER

## 2018-09-14 ASSESSMENT — ENCOUNTER SYMPTOMS
BLOOD IN STOOL: 0
SHORTNESS OF BREATH: 0
SORE THROAT: 0
RHINORRHEA: 0
CONSTIPATION: 0
EYE REDNESS: 0
VOMITING: 0
ABDOMINAL PAIN: 1
COUGH: 0
DIARRHEA: 0
NAUSEA: 0

## 2018-09-14 NOTE — PROGRESS NOTES
Comprehensive Metabolic Panel   Result Value Ref Range    Sodium 142 136 - 145 mmol/L    Potassium 4.5 3.5 - 5.0 mmol/L    Chloride 102 98 - 111 mmol/L    CO2 23 22 - 29 mmol/L    Anion Gap 17 7 - 19 mmol/L    Glucose 102 74 - 109 mg/dL    BUN 19 6 - 20 mg/dL    CREATININE 0.7 0.5 - 0.9 mg/dL    GFR Non-African American >60 >60    Calcium 9.4 8.6 - 10.0 mg/dL    Total Protein 7.1 6.6 - 8.7 g/dL    Alb 4.3 3.5 - 5.2 g/dL    Total Bilirubin 0.3 0.2 - 1.2 mg/dL    Alkaline Phosphatase 48 35 - 104 U/L    ALT 20 5 - 33 U/L    AST 15 5 - 32 U/L       I have reviewed the following with the Ms. Ortiz   Lab Review   Orders Only on 07/26/2018   Component Date Value    Cholesterol, Total 07/26/2018 155*    Triglycerides 07/26/2018 113     HDL 07/26/2018 66     LDL Calculated 07/26/2018 66     TSH 07/26/2018 2.430     T4 Free 07/26/2018 1.0     WBC 07/26/2018 8.8     RBC 07/26/2018 4.60     Hemoglobin 07/26/2018 13.2     Hematocrit 07/26/2018 40.6     MCV 07/26/2018 88.3     MCH 07/26/2018 28.7     MCHC 07/26/2018 32.5*    RDW 07/26/2018 12.8     Platelets 24/64/7558 219     MPV 07/26/2018 11.2     Neutrophils % 07/26/2018 52.9     Lymphocytes % 07/26/2018 35.2     Monocytes % 07/26/2018 5.8     Eosinophils % 07/26/2018 4.9     Basophils % 07/26/2018 0.5     Neutrophils # 07/26/2018 4.6     Lymphocytes # 07/26/2018 3.1     Monocytes # 07/26/2018 0.50     Eosinophils # 07/26/2018 0.40     Basophils # 07/26/2018 0.00     Sodium 07/26/2018 142     Potassium 07/26/2018 4.5     Chloride 07/26/2018 102     CO2 07/26/2018 23     Anion Gap 07/26/2018 17     Glucose 07/26/2018 102     BUN 07/26/2018 19     CREATININE 07/26/2018 0.7     GFR Non- 07/26/2018 >60     Calcium 07/26/2018 9.4     Total Protein 07/26/2018 7.1     Alb 07/26/2018 4.3     Total Bilirubin 07/26/2018 0.3     Alkaline Phosphatase 07/26/2018 48     ALT 07/26/2018 20     AST 07/26/2018 15      Copies of these Constitutional: She appears well-developed. HENT:   Head: Normocephalic. Right Ear: Hearing and external ear normal.   Left Ear: Hearing and external ear normal.   Nose: Nose normal.   Mouth/Throat: Oropharynx is clear and moist.   Neck: Normal range of motion. Cardiovascular: Normal rate and regular rhythm. Pulmonary/Chest: Effort normal and breath sounds normal. No respiratory distress. She has no wheezes. She has no rales. Abdominal: Soft. Bowel sounds are normal. There is tenderness in the right upper quadrant, epigastric area and left upper quadrant. Neurological: She is alert. Skin: Skin is dry. Vitals reviewed. ASSESSMENT      ICD-10-CM ICD-9-CM    1. Midepigastric pain R10.13 789.06 pantoprazole (PROTONIX) 40 MG tablet  Continue with gluten-free diet   All foods cause the stomach to produce acid, although foods can affect people in different ways. Following is a list of foods and beverages that may aggravate your stomach. You may want to eat them less often. 1. Fried foods or fatty foods  2. Heavy seasoning and spicy foods  3. Coffee  4. Onions  5. Orange juice, grapefruit juice, and tomato juice  6. Alcoholic beverages  7. Chocolate  8. Peppermint     Try these lifestyle changes:    1. Stop smoking  2. Exercise to help control your weight  3. Eat small well-balanced meals  4. Reduce abdominal pressure (ie) tight belts  5. Avoid eating within 2 hours of bedtime  6. Elevate the head of the bed 6 to 8 inches higher than the foot of the bed. 2. Pain of upper abdomen R10.10 789.09 pantoprazole (PROTONIX) 40 MG tablet   3. Essential hypertension I10 401.9 Patient reports blood pressure is well controlled when checked at home. Patient is asked to monitor BP at home or work, several times per month and return with written values at next office visit. PLAN    No orders of the defined types were placed in this encounter. Return in about 2 months (around 11/14/2018). There are no Patient Instructions on file for this visit. Controlled Substances Monitoring:  n/a            Additional Instructions: As always, patient is advised to bring in medication bottles in order to correctly reconcile with our current list.    Roman Cardoza received counseling on the following healthy behaviors: n/a    Patient given educational materials on dx    I have instructed Roman Cardoza to complete a self tracking handout on n/a and instructed them to bring it with them to her next appointment. Discussed use, benefit, and side effects of prescribed medications. Barriers to medication compliance addressed. All patient questions answered. Pt voiced understanding.      JUAN Jaeger

## 2018-11-21 ENCOUNTER — OFFICE VISIT (OUTPATIENT)
Dept: PRIMARY CARE CLINIC | Age: 23
End: 2018-11-21
Payer: COMMERCIAL

## 2018-11-21 VITALS
HEART RATE: 101 BPM | HEIGHT: 64 IN | BODY MASS INDEX: 44.22 KG/M2 | WEIGHT: 259 LBS | SYSTOLIC BLOOD PRESSURE: 144 MMHG | TEMPERATURE: 97.4 F | DIASTOLIC BLOOD PRESSURE: 88 MMHG | OXYGEN SATURATION: 98 %

## 2018-11-21 DIAGNOSIS — K21.9 GASTROESOPHAGEAL REFLUX DISEASE, ESOPHAGITIS PRESENCE NOT SPECIFIED: ICD-10-CM

## 2018-11-21 DIAGNOSIS — I10 ESSENTIAL HYPERTENSION: Primary | ICD-10-CM

## 2018-11-21 PROCEDURE — 99213 OFFICE O/P EST LOW 20 MIN: CPT | Performed by: NURSE PRACTITIONER

## 2018-11-21 RX ORDER — FLUTICASONE PROPIONATE 50 MCG
SPRAY, SUSPENSION (ML) NASAL
Refills: 0 | COMMUNITY
Start: 2018-10-12 | End: 2021-04-13 | Stop reason: SDUPTHER

## 2018-11-21 RX ORDER — METOPROLOL SUCCINATE 100 MG/1
100 TABLET, EXTENDED RELEASE ORAL DAILY
Qty: 90 TABLET | Refills: 3 | Status: SHIPPED | OUTPATIENT
Start: 2018-11-21 | End: 2019-11-18 | Stop reason: SDUPTHER

## 2018-11-21 RX ORDER — INFLUENZA A VIRUS A/MICHIGAN/45/2015 X-275 (H1N1) ANTIGEN (FORMALDEHYDE INACTIVATED), INFLUENZA A VIRUS A/SINGAPORE/INFIMH-16-0019/2016 IVR-186 (H3N2) ANTIGEN (FORMALDEHYDE INACTIVATED), INFLUENZA B VIRUS B/PHUKET/3073/2013 ANTIGEN (FORMALDEHYDE INACTIVATED), AND INFLUENZA B VIRUS B/MARYLAND/15/2016 BX-69A ANTIGEN (FORMALDEHYDE INACTIVATED) 15; 15; 15; 15 UG/.5ML; UG/.5ML; UG/.5ML; UG/.5ML
INJECTION, SUSPENSION INTRAMUSCULAR
Refills: 0 | COMMUNITY
Start: 2018-09-21 | End: 2018-11-21 | Stop reason: ALTCHOICE

## 2018-11-21 ASSESSMENT — ENCOUNTER SYMPTOMS
VOMITING: 0
COUGH: 0
CONSTIPATION: 0
SORE THROAT: 0
DIARRHEA: 0
EYE REDNESS: 0
ABDOMINAL PAIN: 0
RHINORRHEA: 0
NAUSEA: 0
SHORTNESS OF BREATH: 0

## 2018-11-21 NOTE — PROGRESS NOTES
[address]ess[address]  [address]  Phone [phone]   Fax [phone]      OFFICE VISIT: 2018    Tara LUGO: 1995    Chief Complaint:Kajal is a 24 y.o. female who is here for Follow-up (abdominal pain)     HPI  The patient presents today for 2 month follow-up. Her abdominal pain has improved. She is currently taking the Protonix, and she has been watching her gluten intake. Denies any abdominal pain currently. Her BP has been running 's at home. She is currently taking Toprol XL 50 mg and HCTZ 12.5 mg daily. Today her BP is elevated. height is 5' 4\" (1.626 m) and weight is 259 lb (117.5 kg). Her temporal temperature is 97.4 °F (36.3 °C). Her blood pressure is 144/88 (abnormal) and her pulse is 101. Her oxygen saturation is 98%. Body mass index is 44.46 kg/m².     Results for orders placed or performed in visit on 18   Lipid Panel   Result Value Ref Range    Cholesterol, Total 155 (L) 160 - 199 mg/dL    Triglycerides 113 0 - 149 mg/dL    HDL 66 65 - 121 mg/dL    LDL Calculated 66 <100 mg/dL   TSH without Reflex   Result Value Ref Range    TSH 2.430 0.270 - 4.200 uIU/mL   T4, Free   Result Value Ref Range    T4 Free 1.0 0.9 - 1.7 ng/dL   CBC Auto Differential   Result Value Ref Range    WBC 8.8 4.8 - 10.8 K/uL    RBC 4.60 4.20 - 5.40 M/uL    Hemoglobin 13.2 12.0 - 16.0 g/dL    Hematocrit 40.6 37.0 - 47.0 %    MCV 88.3 81.0 - 99.0 fL    MCH 28.7 27.0 - 31.0 pg    MCHC 32.5 (L) 33.0 - 37.0 g/dL    RDW 12.8 11.5 - 14.5 %    Platelets 024 419 - 004 K/uL    MPV 11.2 9.4 - 12.3 fL    Neutrophils % 52.9 50.0 - 65.0 %    Lymphocytes % 35.2 20.0 - 40.0 %    Monocytes % 5.8 0.0 - 10.0 %    Eosinophils % 4.9 0.0 - 5.0 %    Basophils % 0.5 0.0 - 1.0 %    Neutrophils # 4.6 1.5 - 7.5 K/uL    Lymphocytes # 3.1 1.1 - 4.5 K/uL    Monocytes # 0.50 0.00 - 0.90 K/uL    Eosinophils # 0.40 0.00 - 0.60 K/uL    Basophils # 0.00 0.00 - 0.20 K/uL   Comprehensive Metabolic Panel   Result Musculoskeletal: Negative for arthralgias and gait problem. Skin: Negative for rash. Neurological: Negative for dizziness and headaches. Physical Exam   Constitutional: She appears well-developed. HENT:   Head: Normocephalic. Right Ear: Tympanic membrane and external ear normal.   Left Ear: Tympanic membrane and external ear normal.   Nose: Nose normal.   Mouth/Throat: Oropharynx is clear and moist.   Eyes: Right eye exhibits no discharge. Left eye exhibits no discharge. Neck: Normal range of motion. Carotid bruit is not present. Cardiovascular: Normal rate and regular rhythm. Pulmonary/Chest: Effort normal and breath sounds normal. No respiratory distress. She has no wheezes. She has no rales. Abdominal: Soft. Bowel sounds are normal. She exhibits no distension. There is no tenderness. Neurological: She is alert. Skin: Skin is dry. Psychiatric: She has a normal mood and affect. Her behavior is normal. Judgment and thought content normal.   Vitals reviewed. ASSESSMENT      ICD-10-CM    1. Essential hypertension I10 metoprolol succinate (TOPROL XL) 100 MG extended release tablet (increased from 50 mg)  Continue HCTZ 12.5   Patient is asked to monitor BP at home or work, several times per month and return with written values at next office visit. 2. Gastroesophageal reflux disease, esophagitis presence not specified K21.9 Continue Protonix         PLAN    No orders of the defined types were placed in this encounter. Return in about 6 months (around 5/21/2019), or if symptoms worsen or fail to improve, for Physical.     Patient Instructions   All foods cause the stomach to produce acid, although foods can affect people in different ways. Following is a list of foods and beverages that may aggravate your stomach. You may want to eat them less often. 1. Fried foods or fatty foods  2. Heavy seasoning and spicy foods  3. Coffee  4. Onions  5.  Orange juice, grapefruit juice,

## 2018-11-28 DIAGNOSIS — R10.13 MIDEPIGASTRIC PAIN: ICD-10-CM

## 2018-11-28 DIAGNOSIS — R10.10 PAIN OF UPPER ABDOMEN: ICD-10-CM

## 2018-11-28 RX ORDER — PANTOPRAZOLE SODIUM 40 MG/1
40 TABLET, DELAYED RELEASE ORAL DAILY
Qty: 90 TABLET | Refills: 3 | Status: SHIPPED | OUTPATIENT
Start: 2018-11-28 | End: 2019-11-27 | Stop reason: SDUPTHER

## 2018-12-12 DIAGNOSIS — I10 ESSENTIAL HYPERTENSION: ICD-10-CM

## 2018-12-12 RX ORDER — METOPROLOL SUCCINATE 50 MG/1
TABLET, EXTENDED RELEASE ORAL
Qty: 90 TABLET | Refills: 3 | OUTPATIENT
Start: 2018-12-12

## 2018-12-13 DIAGNOSIS — I10 ESSENTIAL HYPERTENSION: ICD-10-CM

## 2018-12-13 DIAGNOSIS — E78.2 MIXED HYPERLIPIDEMIA: ICD-10-CM

## 2018-12-13 DIAGNOSIS — M79.672 FOOT PAIN, LEFT: ICD-10-CM

## 2018-12-13 DIAGNOSIS — E03.9 ACQUIRED HYPOTHYROIDISM: ICD-10-CM

## 2018-12-13 RX ORDER — HYDROCHLOROTHIAZIDE 12.5 MG/1
12.5 TABLET ORAL DAILY
Qty: 90 TABLET | Refills: 3 | Status: SHIPPED | OUTPATIENT
Start: 2018-12-13 | End: 2020-01-28

## 2018-12-13 RX ORDER — LEVOTHYROXINE SODIUM 0.05 MG/1
50 TABLET ORAL DAILY
Qty: 90 TABLET | Refills: 3 | Status: SHIPPED | OUTPATIENT
Start: 2018-12-13 | End: 2020-01-06

## 2018-12-13 RX ORDER — ATORVASTATIN CALCIUM 40 MG/1
40 TABLET, FILM COATED ORAL NIGHTLY
Qty: 90 TABLET | Refills: 3 | Status: SHIPPED | OUTPATIENT
Start: 2018-12-13 | End: 2021-04-13 | Stop reason: SDUPTHER

## 2018-12-13 RX ORDER — MELOXICAM 15 MG/1
15 TABLET ORAL DAILY
Qty: 90 TABLET | Refills: 3 | Status: SHIPPED | OUTPATIENT
Start: 2018-12-13 | End: 2020-01-06

## 2019-01-15 ENCOUNTER — OFFICE VISIT (OUTPATIENT)
Dept: PRIMARY CARE CLINIC | Age: 24
End: 2019-01-15
Payer: COMMERCIAL

## 2019-01-15 VITALS
BODY MASS INDEX: 43.54 KG/M2 | WEIGHT: 255 LBS | HEIGHT: 64 IN | TEMPERATURE: 96.7 F | HEART RATE: 94 BPM | DIASTOLIC BLOOD PRESSURE: 80 MMHG | OXYGEN SATURATION: 97 % | SYSTOLIC BLOOD PRESSURE: 136 MMHG

## 2019-01-15 DIAGNOSIS — E78.2 MIXED HYPERLIPIDEMIA: ICD-10-CM

## 2019-01-15 DIAGNOSIS — E03.9 ACQUIRED HYPOTHYROIDISM: ICD-10-CM

## 2019-01-15 DIAGNOSIS — I10 ESSENTIAL HYPERTENSION: ICD-10-CM

## 2019-01-15 DIAGNOSIS — Z00.00 ENCOUNTER FOR PREVENTIVE HEALTH EXAMINATION: Primary | ICD-10-CM

## 2019-01-15 PROCEDURE — 99395 PREV VISIT EST AGE 18-39: CPT | Performed by: NURSE PRACTITIONER

## 2019-01-15 RX ORDER — AMOXICILLIN AND CLAVULANATE POTASSIUM 875; 125 MG/1; MG/1
TABLET, FILM COATED ORAL
Refills: 0 | COMMUNITY
Start: 2019-01-08 | End: 2019-03-14 | Stop reason: ALTCHOICE

## 2019-01-15 ASSESSMENT — ENCOUNTER SYMPTOMS
VOMITING: 0
EYE REDNESS: 0
RHINORRHEA: 0
SHORTNESS OF BREATH: 0
DIARRHEA: 0
CONSTIPATION: 0
SORE THROAT: 0
COUGH: 1

## 2019-02-28 DIAGNOSIS — Z30.8 ENCOUNTER FOR OTHER CONTRACEPTIVE MANAGEMENT: ICD-10-CM

## 2019-03-14 ENCOUNTER — OFFICE VISIT (OUTPATIENT)
Dept: PRIMARY CARE CLINIC | Age: 24
End: 2019-03-14
Payer: COMMERCIAL

## 2019-03-14 ENCOUNTER — HOSPITAL ENCOUNTER (OUTPATIENT)
Age: 24
Setting detail: SPECIMEN
Discharge: HOME | End: 2019-03-14
Payer: COMMERCIAL

## 2019-03-14 VITALS
TEMPERATURE: 98.7 F | OXYGEN SATURATION: 97 % | BODY MASS INDEX: 43.87 KG/M2 | HEIGHT: 64 IN | SYSTOLIC BLOOD PRESSURE: 165 MMHG | HEART RATE: 94 BPM | WEIGHT: 257 LBS | DIASTOLIC BLOOD PRESSURE: 91 MMHG

## 2019-03-14 DIAGNOSIS — L68.0 HIRSUTISM: ICD-10-CM

## 2019-03-14 DIAGNOSIS — Z12.4 ROUTINE PAPANICOLAOU SMEAR: Primary | ICD-10-CM

## 2019-03-14 PROCEDURE — S0613 ANN BREAST EXAM: HCPCS | Performed by: NURSE PRACTITIONER

## 2019-03-14 PROCEDURE — 99213 OFFICE O/P EST LOW 20 MIN: CPT | Performed by: NURSE PRACTITIONER

## 2019-03-14 PROCEDURE — 88142 CYTOPATH C/V THIN LAYER: CPT

## 2019-03-14 RX ORDER — SPIRONOLACTONE 25 MG/1
25 TABLET ORAL 2 TIMES DAILY
Qty: 60 TABLET | Refills: 5 | Status: SHIPPED | OUTPATIENT
Start: 2019-03-14 | End: 2019-06-05 | Stop reason: SDUPTHER

## 2019-03-14 RX ORDER — SPIRONOLACTONE 50 MG/1
50 TABLET, FILM COATED ORAL 2 TIMES DAILY
Qty: 60 TABLET | Refills: 3 | Status: SHIPPED | OUTPATIENT
Start: 2019-03-14 | End: 2019-03-14 | Stop reason: SDUPTHER

## 2019-03-14 ASSESSMENT — PATIENT HEALTH QUESTIONNAIRE - PHQ9
SUM OF ALL RESPONSES TO PHQ QUESTIONS 1-9: 0
SUM OF ALL RESPONSES TO PHQ QUESTIONS 1-9: 0
1. LITTLE INTEREST OR PLEASURE IN DOING THINGS: 0
2. FEELING DOWN, DEPRESSED OR HOPELESS: 0
SUM OF ALL RESPONSES TO PHQ9 QUESTIONS 1 & 2: 0

## 2019-03-14 ASSESSMENT — ENCOUNTER SYMPTOMS
VOMITING: 0
CONSTIPATION: 0
RHINORRHEA: 0
DIARRHEA: 0
COUGH: 0
EYE REDNESS: 0
SORE THROAT: 0
SHORTNESS OF BREATH: 0

## 2019-03-20 ENCOUNTER — TELEPHONE (OUTPATIENT)
Dept: PRIMARY CARE CLINIC | Age: 24
End: 2019-03-20

## 2019-05-21 ENCOUNTER — TELEPHONE (OUTPATIENT)
Dept: PRIMARY CARE CLINIC | Age: 24
End: 2019-05-21

## 2019-05-21 ENCOUNTER — OFFICE VISIT (OUTPATIENT)
Dept: PRIMARY CARE CLINIC | Age: 24
End: 2019-05-21
Payer: COMMERCIAL

## 2019-05-21 VITALS
DIASTOLIC BLOOD PRESSURE: 88 MMHG | OXYGEN SATURATION: 98 % | BODY MASS INDEX: 42.68 KG/M2 | TEMPERATURE: 98 F | HEART RATE: 106 BPM | HEIGHT: 64 IN | WEIGHT: 250 LBS | SYSTOLIC BLOOD PRESSURE: 144 MMHG

## 2019-05-21 DIAGNOSIS — I10 ESSENTIAL HYPERTENSION: Primary | ICD-10-CM

## 2019-05-21 DIAGNOSIS — L68.0 HIRSUTISM: ICD-10-CM

## 2019-05-21 DIAGNOSIS — E03.9 ACQUIRED HYPOTHYROIDISM: ICD-10-CM

## 2019-05-21 LAB
ANION GAP SERPL CALCULATED.3IONS-SCNC: 18 MMOL/L (ref 7–19)
BUN BLDV-MCNC: 14 MG/DL (ref 6–20)
CALCIUM SERPL-MCNC: 9.6 MG/DL (ref 8.6–10)
CHLORIDE BLD-SCNC: 102 MMOL/L (ref 98–111)
CO2: 20 MMOL/L (ref 22–29)
CREAT SERPL-MCNC: 0.7 MG/DL (ref 0.5–0.9)
GFR NON-AFRICAN AMERICAN: >60
GLUCOSE BLD-MCNC: 92 MG/DL (ref 74–109)
INSULIN: 25.8 MU/L (ref 2.6–24.9)
POTASSIUM SERPL-SCNC: 4.5 MMOL/L (ref 3.5–5)
SODIUM BLD-SCNC: 140 MMOL/L (ref 136–145)

## 2019-05-21 PROCEDURE — 99213 OFFICE O/P EST LOW 20 MIN: CPT | Performed by: NURSE PRACTITIONER

## 2019-05-21 ASSESSMENT — ENCOUNTER SYMPTOMS
RHINORRHEA: 0
BACK PAIN: 0
ABDOMINAL PAIN: 0
SHORTNESS OF BREATH: 0
VOMITING: 0
EYE REDNESS: 0
CONSTIPATION: 0
COUGH: 0
DIARRHEA: 0
SORE THROAT: 0
NAUSEA: 0

## 2019-05-21 NOTE — PATIENT INSTRUCTIONS
Patient Education        Learning About Hirsutism  What is hirsutism? Hirsutism (say \"HER-mya-tiz-um\") is excess hair on a woman's face or body. It can run in a woman's family. Most of the time, hirsutism is not caused by a medical problem. But once in a while, hirsutism can be a sign of a health problem. What are the symptoms? Symptoms of hirsutism include extra hair that grows on a woman's face, like it does on a man's face. Or it grows on the body, especially the chest and back. The hair is dark and coarse. What causes hirsutism? Hirsutism is common in women who have polycystic ovary syndrome (PCOS). This syndrome affects your hormone balance, ovulation, and menstrual periods. In some women, hirsutism may be caused by higher-than-normal levels of male hormones called androgens. These hormones are found in both men and women, though men have a lot more of them. In women, androgens are produced by the ovaries or the adrenal glands. But some women with hirsutism don't have PCOS or any other cause that can be found. Their hormone levels are normal, and so are their menstrual cycles. These women may have been born with hair follicles that are more sensitive to androgens. Hirsutism may also occur in some women who have diabetes or who are obese. In rare cases, the ovaries or adrenal glands may have a problem that can cause this hair growth. How is it treated? Your doctor may want to do some tests to find out if a medical problem is causing your excess hair growth. If the cause is not a medical problem, treating it is often a matter of choice. Treatments include:  · Birth control pills. This is the most common treatment. Birth control pills contain hormones, so they help balance your body's hormone level. · Antiandrogens. These are prescription medicines that lower the amount of certain hormones in your body.   · Topical cream. Your doctor may prescribe a cream that you rub into affected areas to slow hair instruction, always ask your healthcare professional. [place] [id number] any warranty or liability for your use of this information.

## 2019-05-21 NOTE — TELEPHONE ENCOUNTER
----- Message from JUAN Yan sent at 5/21/2019  4:00 PM CDT -----  BMP is WNL. This includes normal potassium.   Okay to continue Aldactone  Insulin level is borderline normal  Continue Metformin

## 2019-05-21 NOTE — PROGRESS NOTES
[address]  [address]  Phone [phone]   Fax [phone]      OFFICE VISIT: 2019    Mihai Roberto- : 1995    Chief Complaint:Kajal is a 21 y.o. female who is here for Annual Exam     HPI  The patient presents today for follow-up. BP has been running normal at home  128/78 before I left home. She is taking Aldactone 25 mg BID, Toprol 100 mg and HCTZ 12.5 mg. Facial hair is improved with Aldactone. Eyes:  2019  Dentist:  No, \"I don't like the dentist.\"  Denies any dental concerns  Skin:  Facial hair improved with Aldactone  SBE:  Yes  Mammo:  n/a    Pap:  UTD  Menses: Regular  Colonoscopy:  n/a  Dexa Scan:  n/a  Calcium & Vit. D:  No  MVI:  Yes  Supplements:  No  Asa:  No  Labs:  Reviewed  Exercise:  \"I try to walk 30 minutes every day. \"  She is down 7 pounds  Body Image: Overweight  Diet and Nutr: No special diets   Depression:  Moods are well controlled  Fall:  No  EOL:  No  Tobacco:  No  Substance:  No  Sleep: good  Cognition No concerns     height is 5' 4\" (1.626 m) and weight is 250 lb (113.4 kg). Her temporal temperature is 98 °F (36.7 °C). Her blood pressure is 144/88 (abnormal) and her pulse is 106. Her oxygen saturation is 98%. Body mass index is 42.91 kg/m².     Results for orders placed or performed in visit on 18   Lipid Panel   Result Value Ref Range    Cholesterol, Total 155 (L) 160 - 199 mg/dL    Triglycerides 113 0 - 149 mg/dL    HDL 66 65 - 121 mg/dL    LDL Calculated 66 <100 mg/dL   TSH without Reflex   Result Value Ref Range    TSH 2.430 0.270 - 4.200 uIU/mL   T4, Free   Result Value Ref Range    T4 Free 1.0 0.9 - 1.7 ng/dL   CBC Auto Differential   Result Value Ref Range    WBC 8.8 4.8 - 10.8 K/uL    RBC 4.60 4.20 - 5.40 M/uL    Hemoglobin 13.2 12.0 - 16.0 g/dL    Hematocrit 40.6 37.0 - 47.0 %    MCV 88.3 81.0 - 99.0 fL    MCH 28.7 27.0 - 31.0 pg    MCHC 32.5 (L) 33.0 - 37.0 g/dL    RDW 12.8 11.5 - 14.5 %    Platelets 703 648 - 090 K/uL MPV 11.2 9.4 - 12.3 fL    Neutrophils % 52.9 50.0 - 65.0 %    Lymphocytes % 35.2 20.0 - 40.0 %    Monocytes % 5.8 0.0 - 10.0 %    Eosinophils % 4.9 0.0 - 5.0 %    Basophils % 0.5 0.0 - 1.0 %    Neutrophils # 4.6 1.5 - 7.5 K/uL    Lymphocytes # 3.1 1.1 - 4.5 K/uL    Monocytes # 0.50 0.00 - 0.90 K/uL    Eosinophils # 0.40 0.00 - 0.60 K/uL    Basophils # 0.00 0.00 - 0.20 K/uL   Comprehensive Metabolic Panel   Result Value Ref Range    Sodium 142 136 - 145 mmol/L    Potassium 4.5 3.5 - 5.0 mmol/L    Chloride 102 98 - 111 mmol/L    CO2 23 22 - 29 mmol/L    Anion Gap 17 7 - 19 mmol/L    Glucose 102 74 - 109 mg/dL    BUN 19 6 - 20 mg/dL    CREATININE 0.7 0.5 - 0.9 mg/dL    GFR Non-African American >60 >60    Calcium 9.4 8.6 - 10.0 mg/dL    Total Protein 7.1 6.6 - 8.7 g/dL    Alb 4.3 3.5 - 5.2 g/dL    Total Bilirubin 0.3 0.2 - 1.2 mg/dL    Alkaline Phosphatase 48 35 - 104 U/L    ALT 20 5 - 33 U/L    AST 15 5 - 32 U/L     have reviewed the following with the Ms. Ani Carpio   Lab Review   No visits with results within 6 Month(s) from this visit.    Latest known visit with results is:   Orders Only on 07/26/2018   Component Date Value    Cholesterol, Total 07/26/2018 155*    Triglycerides 07/26/2018 113     HDL 07/26/2018 66     LDL Calculated 07/26/2018 66     TSH 07/26/2018 2.430     T4 Free 07/26/2018 1.0     WBC 07/26/2018 8.8     RBC 07/26/2018 4.60     Hemoglobin 07/26/2018 13.2     Hematocrit 07/26/2018 40.6     MCV 07/26/2018 88.3     MCH 07/26/2018 28.7     MCHC 07/26/2018 32.5*    RDW 07/26/2018 12.8     Platelets 29/37/1501 219     MPV 07/26/2018 11.2     Neutrophils % 07/26/2018 52.9     Lymphocytes % 07/26/2018 35.2     Monocytes % 07/26/2018 5.8     Eosinophils % 07/26/2018 4.9     Basophils % 07/26/2018 0.5     Neutrophils # 07/26/2018 4.6     Lymphocytes # 07/26/2018 3.1     Monocytes # 07/26/2018 0.50     Eosinophils # 07/26/2018 0.40     Basophils # 07/26/2018 0.00     Sodium 07/26/2018 142     Potassium 07/26/2018 4.5     Chloride 07/26/2018 102     CO2 07/26/2018 23     Anion Gap 07/26/2018 17     Glucose 07/26/2018 102     BUN 07/26/2018 19     CREATININE 07/26/2018 0.7     GFR Non- 07/26/2018 >60     Calcium 07/26/2018 9.4     Total Protein 07/26/2018 7.1     Alb 07/26/2018 4.3     Total Bilirubin 07/26/2018 0.3     Alkaline Phosphatase 07/26/2018 48     ALT 07/26/2018 20     AST 07/26/2018 15      Copies of these are in the chart. Prior to Visit Medications    Medication Sig Taking? Authorizing Provider   spironolactone (ALDACTONE) 25 MG tablet Take 1 tablet by mouth 2 times daily Yes JUAN Mills   norethindrone-ethinyl estradiol (NORTREL 7/7/7) 0.5/0.75/1-35 MG-MCG per tablet Take 1 tablet by mouth daily Yes PATRIA Mccormick DO   meloxicam (MOBIC) 15 MG tablet Take 1 tablet by mouth daily Yes JUAN Avendaño   atorvastatin (LIPITOR) 40 MG tablet TAKE 1 TABLET BY MOUTH NIGHTLY Yes JUAN Avendaño   levothyroxine (SYNTHROID) 50 MCG tablet Take 1 tablet by mouth Daily Yes JUAN Avendaño   hydrochlorothiazide (HYDRODIURIL) 12.5 MG tablet Take 1 tablet by mouth daily Yes JUAN Avendaño   pantoprazole (PROTONIX) 40 MG tablet Take 1 tablet by mouth daily Yes PATRIA Mccormick DO   fluticasone (FLONASE) 50 MCG/ACT nasal spray SPRAY 1 SPRAY INTO EACH NOSTRIL TWICE A DAY Yes Historical Provider, MD   metoprolol succinate (TOPROL XL) 100 MG extended release tablet Take 1 tablet by mouth daily Yes JUAN Mills   loratadine (EQ ALLERGY RELIEF) 10 MG tablet Take 1 tablet by mouth daily Yes JUAN Sumner       Allergies: Patient has no known allergies.     Past Medical History:   Diagnosis Date    Allergic rhinitis     GERD (gastroesophageal reflux disease)     Hyperlipidemia     Hypertension     Hypothyroidism        Past Surgical History:   Procedure Laterality Date    CYST REMOVAL         Social History     Tobacco Use    Smoking status: Never Smoker    Smokeless tobacco: Never Used   Substance Use Topics    Alcohol use: No       Family History   Problem Relation Age of Onset    Diabetes Paternal Grandmother        Review of Systems   Constitutional: Negative for chills, fatigue and fever. HENT: Negative for congestion, ear pain, rhinorrhea and sore throat. Eyes: Negative for redness. Respiratory: Negative for cough and shortness of breath. Cardiovascular: Negative for chest pain. Gastrointestinal: Negative for abdominal pain, constipation, diarrhea, nausea and vomiting. Genitourinary: Negative for dysuria, frequency and urgency. Musculoskeletal: Negative for arthralgias and back pain. Skin: Negative for rash. Neurological: Negative for dizziness and headaches. Psychiatric/Behavioral: Negative for sleep disturbance. The patient is not nervous/anxious. Physical Exam   Constitutional: She appears well-developed. HENT:   Head: Normocephalic. Right Ear: Tympanic membrane and external ear normal.   Left Ear: Tympanic membrane and external ear normal.   Nose: Nose normal.   Mouth/Throat: Oropharynx is clear and moist.   Eyes: Right eye exhibits no discharge. Left eye exhibits no discharge. Neck: Normal range of motion. Carotid bruit is not present. Cardiovascular: Normal rate and regular rhythm. Pulmonary/Chest: Effort normal and breath sounds normal. No respiratory distress. She has no wheezes. She has no rales. Abdominal: Soft. Bowel sounds are normal. She exhibits no distension. There is no tenderness. Neurological: She is alert. Skin: Skin is dry. Psychiatric: She has a normal mood and affect. Her behavior is normal. Judgment and thought content normal.   Vitals reviewed. ASSESSMENT      ICD-10-CM    1. Essential hypertension I10 Patient is asked to monitor BP at home or work, several times per month and return with written values at next office visit. 2. Acquired hypothyroidism E03.9 The current medical regimen is effective;  continue present plan and medications. 3. Hirsutism L68.0 Continue Aldactone         PLAN    No orders of the defined types were placed in this encounter. Return in about 6 months (around 11/21/2019), or if symptoms worsen or fail to improve. Patient Instructions     Patient Education        Learning About Hirsutism  What is hirsutism? Hirsutism (say \"HER-mya-tiz-um\") is excess hair on a woman's face or body. It can run in a woman's family. Most of the time, hirsutism is not caused by a medical problem. But once in a while, hirsutism can be a sign of a health problem. What are the symptoms? Symptoms of hirsutism include extra hair that grows on a woman's face, like it does on a man's face. Or it grows on the body, especially the chest and back. The hair is dark and coarse. What causes hirsutism? Hirsutism is common in women who have polycystic ovary syndrome (PCOS). This syndrome affects your hormone balance, ovulation, and menstrual periods. In some women, hirsutism may be caused by higher-than-normal levels of male hormones called androgens. These hormones are found in both men and women, though men have a lot more of them. In women, androgens are produced by the ovaries or the adrenal glands. But some women with hirsutism don't have PCOS or any other cause that can be found. Their hormone levels are normal, and so are their menstrual cycles. These women may have been born with hair follicles that are more sensitive to androgens. Hirsutism may also occur in some women who have diabetes or who are obese. In rare cases, the ovaries or adrenal glands may have a problem that can cause this hair growth. How is it treated? Your doctor may want to do some tests to find out if a medical problem is causing your excess hair growth. If the cause is not a medical problem, treating it is often a matter of choice.  Treatments include:  · Birth control pills. This is the most common treatment. Birth control pills contain hormones, so they help balance your body's hormone level. · Antiandrogens. These are prescription medicines that lower the amount of certain hormones in your body. · Topical cream. Your doctor may prescribe a cream that you rub into affected areas to slow hair growth. · Laser hair removal. This procedure uses laser treatments to heat and destroy hair follicles. Hair can be removed for good, but it may take many treatments. · Electrolysis. An electric current is applied to the hair root. This is also permanent, and it may take many treatments. It can also cause scars. If your doctor prescribed medicines, take them as directed. Be safe with medicines. Call your doctor if you think you are having a problem with your medicine. Women who have PCOS and who are overweight may be able to reduce excess hair growth by reaching a healthy weight. Home care  Some women prefer to use home treatments for unwanted hair. These treatments include:  · Using depilatories. These are over-the-counter creams that dissolve hair. They may irritate the skin. Hair growth returns. · Waxing. This treatment pulls the hair out by the root. Repeated waxing may result in less hair growth, but it can be painful and may irritate the skin. · Shaving. Shaving does not increase hair growth, but it can cause stubble. · Tweezing. This takes a lot of time and can be painful. · Bleaching. Bleaching makes hair lighter and harder to see. New hair that grows in will be its natural color. Follow-up care is a key part of your treatment and safety. Be sure to make and go to all appointments, and call your doctor if you are having problems. It's also a good idea to know your test results and keep a list of the medicines you take. Where can you learn more? Go to [email or web address] and sign in to your [employer] account.  Enter U306 in the Search Health Information box to learn more about \"Learning About Hirsutism. \"     If you do not have an account, please click on the \"Sign Up Now\" link. Current as of: April 17, 2018  Content Version: 12.0  © 5390-1525 [employer]. Care instructions adapted under license by [facility] [place] ([facility]). If you have questions about a medical condition or this instruction, always ask your healthcare professional. [place] [address] any warranty or liability for your use of this information. Controlled Substances Monitoring:  n/a            Additional Instructions: As always, patient is advised to bring in medication bottles in order to correctly reconcile with our current list.    Lisa Antonio received counseling on the following healthy behaviors: n/a    Patient given educational materials on dx    I have instructed Lisa Antonio to complete a self tracking handout on n/a and instructed them to bring it with them to her next appointment. Discussed use, benefit, and side effects of prescribed medications. Barriers to medication compliance addressed. All patient questions answered. Pt voiced understanding.      JUAN Mckeon

## 2019-06-05 DIAGNOSIS — L68.0 HIRSUTISM: ICD-10-CM

## 2019-06-05 RX ORDER — SPIRONOLACTONE 25 MG/1
25 TABLET ORAL 2 TIMES DAILY
Qty: 180 TABLET | Refills: 3 | Status: SHIPPED | OUTPATIENT
Start: 2019-06-05 | End: 2020-06-08

## 2019-06-05 NOTE — TELEPHONE ENCOUNTER
Received fax from pharmacy requesting refill on pts medication(s). Pt was last seen in office on 5/21/2019  and has a follow up scheduled for Visit date not found. Will send request to  Dr. Abraham Montes De Oca  for patient.      Requested Prescriptions     Pending Prescriptions Disp Refills    spironolactone (ALDACTONE) 25 MG tablet 180 tablet 3     Sig: Take 1 tablet by mouth 2 times daily

## 2019-11-18 DIAGNOSIS — I10 ESSENTIAL HYPERTENSION: ICD-10-CM

## 2019-11-19 RX ORDER — METOPROLOL SUCCINATE 100 MG/1
100 TABLET, EXTENDED RELEASE ORAL DAILY
Qty: 90 TABLET | Refills: 1 | Status: SHIPPED | OUTPATIENT
Start: 2019-11-19 | End: 2020-05-22

## 2019-11-27 DIAGNOSIS — R10.13 MIDEPIGASTRIC PAIN: ICD-10-CM

## 2019-11-27 DIAGNOSIS — R10.10 PAIN OF UPPER ABDOMEN: ICD-10-CM

## 2019-11-27 RX ORDER — PANTOPRAZOLE SODIUM 40 MG/1
TABLET, DELAYED RELEASE ORAL
Qty: 90 TABLET | Refills: 3 | Status: SHIPPED | OUTPATIENT
Start: 2019-11-27 | End: 2020-11-18 | Stop reason: SDUPTHER

## 2020-01-06 NOTE — TELEPHONE ENCOUNTER
Received fax from pharmacy requesting refill on pts medication(s). Pt was last seen in office on 5/21/2019  and has a follow up scheduled for Visit date not found. Will send request to  Dr. Bard Dobbins  for patient.      Requested Prescriptions     Pending Prescriptions Disp Refills    levothyroxine (SYNTHROID) 50 MCG tablet [Pharmacy Med Name: LEVOTHYROXINE 50 MCG TABLET] 90 tablet 3     Sig: TAKE 1 TABLET BY MOUTH EVERY DAY

## 2020-01-07 RX ORDER — MELOXICAM 15 MG/1
15 TABLET ORAL DAILY
Qty: 90 TABLET | Refills: 1 | Status: SHIPPED | OUTPATIENT
Start: 2020-01-07 | End: 2020-07-07

## 2020-01-07 RX ORDER — LEVOTHYROXINE SODIUM 0.05 MG/1
50 TABLET ORAL DAILY
Qty: 30 TABLET | Refills: 0 | Status: SHIPPED | OUTPATIENT
Start: 2020-01-07 | End: 2020-02-03

## 2020-01-28 RX ORDER — HYDROCHLOROTHIAZIDE 12.5 MG/1
12.5 TABLET ORAL DAILY
Qty: 90 TABLET | Refills: 3 | Status: SHIPPED | OUTPATIENT
Start: 2020-01-28 | End: 2021-02-01

## 2020-01-28 NOTE — TELEPHONE ENCOUNTER
Pt seen 5/21/19.       Requested Prescriptions     Pending Prescriptions Disp Refills    hydrochlorothiazide (HYDRODIURIL) 12.5 MG tablet [Pharmacy Med Name: HYDROCHLOROTHIAZIDE 12.5 MG TB] 90 tablet 3     Sig: TAKE 1 TABLET BY MOUTH EVERY DAY

## 2020-02-03 RX ORDER — LEVOTHYROXINE SODIUM 0.05 MG/1
50 TABLET ORAL DAILY
Qty: 30 TABLET | Refills: 0 | Status: SHIPPED | OUTPATIENT
Start: 2020-02-03 | End: 2020-03-16

## 2020-03-05 RX ORDER — LEVOTHYROXINE SODIUM 0.05 MG/1
50 TABLET ORAL DAILY
Qty: 30 TABLET | Refills: 0 | OUTPATIENT
Start: 2020-03-05

## 2020-03-13 ENCOUNTER — OFFICE VISIT (OUTPATIENT)
Dept: PRIMARY CARE CLINIC | Age: 25
End: 2020-03-13
Payer: COMMERCIAL

## 2020-03-13 VITALS
BODY MASS INDEX: 43.02 KG/M2 | HEART RATE: 94 BPM | DIASTOLIC BLOOD PRESSURE: 80 MMHG | SYSTOLIC BLOOD PRESSURE: 138 MMHG | HEIGHT: 64 IN | OXYGEN SATURATION: 99 % | WEIGHT: 252 LBS | TEMPERATURE: 96.9 F

## 2020-03-13 DIAGNOSIS — R53.82 CHRONIC FATIGUE: ICD-10-CM

## 2020-03-13 DIAGNOSIS — E03.9 ACQUIRED HYPOTHYROIDISM: ICD-10-CM

## 2020-03-13 LAB
ALBUMIN SERPL-MCNC: 4.6 G/DL (ref 3.5–5.2)
ALP BLD-CCNC: 46 U/L (ref 35–104)
ALT SERPL-CCNC: 15 U/L (ref 5–33)
ANION GAP SERPL CALCULATED.3IONS-SCNC: 16 MMOL/L (ref 7–19)
AST SERPL-CCNC: 15 U/L (ref 5–32)
BASOPHILS ABSOLUTE: 0 K/UL (ref 0–0.2)
BASOPHILS RELATIVE PERCENT: 0.3 % (ref 0–1)
BILIRUB SERPL-MCNC: <0.2 MG/DL (ref 0.2–1.2)
BUN BLDV-MCNC: 16 MG/DL (ref 6–20)
CALCIUM SERPL-MCNC: 9.8 MG/DL (ref 8.6–10)
CHLORIDE BLD-SCNC: 100 MMOL/L (ref 98–111)
CO2: 23 MMOL/L (ref 22–29)
CREAT SERPL-MCNC: 0.7 MG/DL (ref 0.5–0.9)
EOSINOPHILS ABSOLUTE: 0.1 K/UL (ref 0–0.6)
EOSINOPHILS RELATIVE PERCENT: 1.2 % (ref 0–5)
GFR NON-AFRICAN AMERICAN: >60
GLUCOSE BLD-MCNC: 82 MG/DL (ref 74–109)
HCT VFR BLD CALC: 39.3 % (ref 37–47)
HEMOGLOBIN: 12.6 G/DL (ref 12–16)
IMMATURE GRANULOCYTES #: 0.1 K/UL
LYMPHOCYTES ABSOLUTE: 2.9 K/UL (ref 1.1–4.5)
LYMPHOCYTES RELATIVE PERCENT: 28.4 % (ref 20–40)
MCH RBC QN AUTO: 28.3 PG (ref 27–31)
MCHC RBC AUTO-ENTMCNC: 32.1 G/DL (ref 33–37)
MCV RBC AUTO: 88.1 FL (ref 81–99)
MONOCYTES ABSOLUTE: 0.5 K/UL (ref 0–0.9)
MONOCYTES RELATIVE PERCENT: 4.6 % (ref 0–10)
NEUTROPHILS ABSOLUTE: 6.6 K/UL (ref 1.5–7.5)
NEUTROPHILS RELATIVE PERCENT: 65 % (ref 50–65)
PDW BLD-RTO: 12.7 % (ref 11.5–14.5)
PLATELET # BLD: 266 K/UL (ref 130–400)
PMV BLD AUTO: 11.2 FL (ref 9.4–12.3)
POTASSIUM SERPL-SCNC: 4.1 MMOL/L (ref 3.5–5)
RBC # BLD: 4.46 M/UL (ref 4.2–5.4)
SODIUM BLD-SCNC: 139 MMOL/L (ref 136–145)
T4 FREE: 1.01 NG/DL (ref 0.93–1.7)
TOTAL PROTEIN: 7.5 G/DL (ref 6.6–8.7)
TSH SERPL DL<=0.05 MIU/L-ACNC: 1.87 UIU/ML (ref 0.27–4.2)
VITAMIN B-12: 570 PG/ML (ref 211–946)
VITAMIN D 25-HYDROXY: 22.1 NG/ML
WBC # BLD: 10.2 K/UL (ref 4.8–10.8)

## 2020-03-13 PROCEDURE — 99214 OFFICE O/P EST MOD 30 MIN: CPT | Performed by: NURSE PRACTITIONER

## 2020-03-13 RX ORDER — SERTRALINE HYDROCHLORIDE 25 MG/1
25 TABLET, FILM COATED ORAL DAILY
Qty: 30 TABLET | Refills: 5 | Status: SHIPPED | OUTPATIENT
Start: 2020-03-13 | End: 2020-04-07 | Stop reason: SDUPTHER

## 2020-03-13 ASSESSMENT — ENCOUNTER SYMPTOMS
EYE REDNESS: 0
DIARRHEA: 0
SHORTNESS OF BREATH: 0
VOMITING: 0
RHINORRHEA: 0
COUGH: 0
SORE THROAT: 0
CONSTIPATION: 0

## 2020-03-13 ASSESSMENT — PATIENT HEALTH QUESTIONNAIRE - PHQ9
1. LITTLE INTEREST OR PLEASURE IN DOING THINGS: 0
SUM OF ALL RESPONSES TO PHQ9 QUESTIONS 1 & 2: 0
2. FEELING DOWN, DEPRESSED OR HOPELESS: 0
SUM OF ALL RESPONSES TO PHQ QUESTIONS 1-9: 0
SUM OF ALL RESPONSES TO PHQ QUESTIONS 1-9: 0

## 2020-03-13 NOTE — PROGRESS NOTES
[address]  [address]  Phone [phone]   Fax [phone]      OFFICE VISIT: 3/13/2020    Munira Guaman- : 1995    Chief Complaint:Kajal is a 21 y.o. female who is here for Medication Refill; Anxiety (in crowds); Fatigue; and Mouth Lesions     HPI  The patient presents today for follow-up. HTN:  She is currently taking HCTZ 12.5 mg, Toprol  mg, & Aldactone 25 mg BID  BP is well controlled today. Denies any headache or unexplained facial flushing. FATIGUE:  Reports chronic fatigue. This is chronic but it has acutely gotten worse. Periods are regular. She has not had recent lab work. She is unsure if she snores. She doesn't always wake up feeling rested. ANXIETY:  Reports increased social anxiety. \"I feel like everyone is staring at me. \"  \"I hate going to [address] [place] [address]. \"  \"It happens at work sometimes, when I have a huge line, I just want to walk away and cry. \"  \"I don't want to talk to someone. \"     height is 5' 4\" (1.626 m) and weight is 252 lb (114.3 kg). Her temporal temperature is 96.9 °F (36.1 °C). Her blood pressure is 138/80 and her pulse is 94. Her oxygen saturation is 99%. Body mass index is 43.26 kg/m². Results for orders placed or performed in visit on    Basic Metabolic Panel   Result Value Ref Range    Sodium 140 136 - 145 mmol/L    Potassium 4.5 3.5 - 5.0 mmol/L    Chloride 102 98 - 111 mmol/L    CO2 20 (L) 22 - 29 mmol/L    Anion Gap 18 7 - 19 mmol/L    Glucose 92 74 - 109 mg/dL    BUN 14 6 - 20 mg/dL    CREATININE 0.7 0.5 - 0.9 mg/dL    GFR Non-African American >60 >60    Calcium 9.6 8.6 - 10.0 mg/dL   Insulin, total   Result Value Ref Range    Insulin 25.8 (H) 2.6 - 24.9 mU/L     have reviewed the following with the Ms. José Miguel Colon   Lab Review   No visits with results within 6 Month(s) from this visit.    Latest known visit with results is:   Orders Only on 2019   Component Date Value    Sodium 2019 140     Potassium 2019 4.5  Chloride 05/21/2019 102     CO2 05/21/2019 20*    Anion Gap 05/21/2019 18     Glucose 05/21/2019 92     BUN 05/21/2019 14     CREATININE 05/21/2019 0.7     GFR Non- 05/21/2019 >60     Calcium 05/21/2019 9.6     Insulin 05/21/2019 25.8*     Copies of these are in the chart. Prior to Visit Medications    Medication Sig Taking? Authorizing Provider   sertraline (ZOLOFT) 25 MG tablet Take 1 tablet by mouth daily Yes JUAN Mills   levothyroxine (SYNTHROID) 50 MCG tablet Take 1 tablet by mouth Daily (must be seen and labs before further refills) Yes PATRIA Cervantes DO   hydrochlorothiazide (HYDRODIURIL) 12.5 MG tablet Take 1 tablet by mouth daily Yes JUAN Rowan   meloxicam (MOBIC) 15 MG tablet Take 1 tablet by mouth daily Yes JUAN Rowan   pantoprazole (PROTONIX) 40 MG tablet TAKE 1 TABLET BY MOUTH EVERY DAY Yes JUAN Rowan   metoprolol succinate (TOPROL XL) 100 MG extended release tablet Take 1 tablet by mouth daily Yes JUAN Klein   spironolactone (ALDACTONE) 25 MG tablet Take 1 tablet by mouth 2 times daily Yes JUAN Mills   norethindrone-ethinyl estradiol (NORTREL 7/7/7) 0.5/0.75/1-35 MG-MCG per tablet Take 1 tablet by mouth daily Yes PATRIA Cervantes DO   atorvastatin (LIPITOR) 40 MG tablet TAKE 1 TABLET BY MOUTH NIGHTLY Yes JUAN Klein   fluticasone (FLONASE) 50 MCG/ACT nasal spray SPRAY 1 SPRAY INTO EACH NOSTRIL TWICE A DAY Yes Historical Provider, MD   loratadine (EQ ALLERGY RELIEF) 10 MG tablet Take 1 tablet by mouth daily Yes JUAN Rowan       Allergies: Patient has no known allergies.     Past Medical History:   Diagnosis Date    Allergic rhinitis     GERD (gastroesophageal reflux disease)     Hyperlipidemia     Hypertension     Hypothyroidism        Past Surgical History:   Procedure Laterality Date    CYST REMOVAL         Social History     Tobacco Use current medical regimen is effective;  continue present plan and medications. Patient is asked to monitor BP at home or work, several times per month and return with written values at next office visit. 3. Acquired hypothyroidism E03.9 TSH without Reflex     T4, Free  Continue Synthroid   4. Social anxiety disorder F40.10 sertraline (ZOLOFT) 25 MG tablet   5. Canker sore K12.0  Over-the-counter treatment as needed         PLAN    Orders Placed This Encounter   Procedures    CBC Auto Differential    Comprehensive Metabolic Panel    TSH without Reflex    T4, Free    Lipid Panel    Vitamin B12    Vitamin D 25 Hydroxy        Return in about 1 month (around 4/13/2020), or if symptoms worsen or fail to improve. Patient Instructions     Patient Education        sertraline  Pronunciation:  SER tra rome  Brand:  Zoloft  What is the most important information I should know about sertraline? You should not use sertraline if you also take pimozide, or if you are being treated with methylene blue injection. Do not use this medicine if you have used an MAO inhibitor in the past 14 days, such as isocarboxazid, linezolid, methylene blue injection, phenelzine, rasagiline, selegiline, or tranylcypromine. Some young people have thoughts about suicide when first taking an antidepressant. Stay alert to changes in your mood or symptoms. Report any new or worsening symptoms to your doctor. Seek medical attention right away if you have symptoms of serotonin syndrome, such as: agitation, hallucinations, fever, sweating, shivering, fast heart rate, muscle stiffness, twitching, loss of coordination, nausea, vomiting, or diarrhea. What is sertraline? Sertraline is an antidepressant in a group of drugs called selective serotonin reuptake inhibitors (SSRIs). Sertraline affects chemicals in the brain that may be unbalanced in people with depression, panic, anxiety, or obsessive-compulsive symptoms.   Sertraline is used to treat serve consumers viewing this service as a supplement to, and not a substitute for, the expertise, skill, knowledge and judgment of healthcare practitioners. The absence of a warning for a given drug or drug combination in no way should be construed to indicate that the drug or drug combination is safe, effective or appropriate for any given patient. [facility] does not assume any responsibility for any aspect of healthcare administered with the aid of information [facility] provides. The information contained herein is not intended to cover all possible uses, directions, precautions, warnings, drug interactions, allergic reactions, or adverse effects. If you have questions about the drugs you are taking, check with your doctor, nurse or pharmacist.  Copyright 8679-3005 [employer]. Version: 21.01. Revision date: 8/9/2017. Care instructions adapted under license by [facility] [place] ([facility] [place]). If you have questions about a medical condition or this instruction, always ask your healthcare professional. [place] [address] any warranty or liability for your use of this information. Controlled Substances Monitoring:  n/a            Additional Instructions: As always, patient is advised to bring in medication bottles in order to correctly reconcile with our current list.    Carlos Enrique Morales received counseling on the following healthy behaviors: n/a    Patient given educational materials on dx    I have instructed Carlos Enrique Morales to complete a self tracking handout on n/a and instructed them to bring it with them to her next appointment. Discussed use, benefit, and side effects of prescribed medications. Barriers to medication compliance addressed. All patient questions answered. Pt voiced understanding.      JUAN Hill

## 2020-03-13 NOTE — PATIENT INSTRUCTIONS
Patient Education        sertraline  Pronunciation:  SER chris peter  Brand:  Zoloft  What is the most important information I should know about sertraline? You should not use sertraline if you also take pimozide, or if you are being treated with methylene blue injection. Do not use this medicine if you have used an MAO inhibitor in the past 14 days, such as isocarboxazid, linezolid, methylene blue injection, phenelzine, rasagiline, selegiline, or tranylcypromine. Some young people have thoughts about suicide when first taking an antidepressant. Stay alert to changes in your mood or symptoms. Report any new or worsening symptoms to your doctor. Seek medical attention right away if you have symptoms of serotonin syndrome, such as: agitation, hallucinations, fever, sweating, shivering, fast heart rate, muscle stiffness, twitching, loss of coordination, nausea, vomiting, or diarrhea. What is sertraline? Sertraline is an antidepressant in a group of drugs called selective serotonin reuptake inhibitors (SSRIs). Sertraline affects chemicals in the brain that may be unbalanced in people with depression, panic, anxiety, or obsessive-compulsive symptoms. Sertraline is used to treat depression, obsessive-compulsive disorder, panic disorder, anxiety disorders, post-traumatic stress disorder (PTSD), and premenstrual dysphoric disorder (PMDD). Sertraline may also be used for purposes not listed in this medication guide. What should I discuss with my healthcare provider before taking sertraline? You should not use sertraline if you are allergic to it, or if you also take pimozide. Do not use the liquid form of sertraline  if you are taking disulfiram (Antabuse) or you could have a severe reaction to the disulfiram.  Do not take sertraline within 14 days before or 14 days after you take an MAO inhibitor. A dangerous drug interaction could occur.  MAO inhibitors include isocarboxazid, linezolid, phenelzine, rasagiline, or do anything that requires you to be alert. What are the possible side effects of sertraline? Get emergency medical help if you have signs of an allergic reaction: skin rash or hives (with or without fever or joint pain); difficulty breathing; swelling of your face, lips, tongue, or throat. Report any new or worsening symptoms to your doctor, such as: mood or behavior changes, anxiety, panic attacks, trouble sleeping, or if you feel impulsive, irritable, agitated, hostile, aggressive, restless, hyperactive (mentally or physically), more depressed, or have thoughts about suicide or hurting yourself. Call your doctor at once if you have:  · a seizure (convulsions);  · blurred vision, tunnel vision, eye pain or swelling;  · low levels of sodium in the body --headache, confusion, memory problems, severe weakness, feeling unsteady; or  · manic episodes --racing thoughts, increased energy, unusual risk-taking behavior, extreme happiness, being irritable or talkative. Seek medical attention right away if you have symptoms of serotonin syndrome, such as: agitation, hallucinations, fever, sweating, shivering, fast heart rate, muscle stiffness, twitching, loss of coordination, nausea, vomiting, or diarrhea. Common side effects may include:  · drowsiness, tiredness, feeling anxious or agitated;  · indigestion, nausea, diarrhea, loss of appetite;  · sweating;  · tremors or shaking;  · sleep problems (insomnia); or  · decreased sex drive, impotence, or difficulty having an orgasm. This is not a complete list of side effects and others may occur. Call your doctor for medical advice about side effects. You may report side effects to FDA at 7-980-FDA-8451. What other drugs will affect sertraline? Taking sertraline with other drugs that make you sleepy can worsen this effect. Ask your doctor before taking a sleeping pill, narcotic medication, muscle relaxer, or medicine for anxiety, depression, or seizures.   Other drugs may interact with sertraline, including prescription and over-the-counter medicines, vitamins, and herbal products. Tell your doctor about all your current medicines and any medicine you start or stop using. Where can I get more information? Your pharmacist can provide more information about sertraline. Remember, keep this and all other medicines out of the reach of children, never share your medicines with others, and use this medication only for the indication prescribed. Every effort has been made to ensure that the information provided by Aditya Schroeder [address] is accurate, up-to-date, and complete, but no guarantee is made to that effect. Drug information contained herein may be time sensitive. [facility] information has been compiled for use by healthcare practitioners and consumers in the [place] and therefore [facility] does not warrant that uses outside of the [place] are appropriate, unless specifically indicated otherwise. [facility]'s drug information does not endorse drugs, diagnose patients or recommend therapy. [facility] drug information is an informational resource designed to assist licensed healthcare practitioners in caring for their patients and/or to serve consumers viewing this service as a supplement to, and not a substitute for, the expertise, skill, knowledge and judgment of healthcare practitioners. The absence of a warning for a given drug or drug combination in no way should be construed to indicate that the drug or drug combination is safe, effective or appropriate for any given patient. [facility] does not assume any responsibility for any aspect of healthcare administered with the aid of information [facility] provides. The information contained herein is not intended to cover all possible uses, directions, precautions, warnings, drug interactions, allergic reactions, or adverse effects.  If you have questions about the drugs you are taking, check with your doctor, nurse or

## 2020-03-16 RX ORDER — LEVOTHYROXINE SODIUM 0.05 MG/1
50 TABLET ORAL DAILY
Qty: 30 TABLET | Refills: 0 | Status: SHIPPED | OUTPATIENT
Start: 2020-03-16 | End: 2020-04-08

## 2020-03-16 RX ORDER — NORETHINDRONE AND ETHINYL ESTRADIOL 7 DAYS X 3
1 KIT ORAL DAILY
Qty: 84 TABLET | Refills: 3 | Status: SHIPPED | OUTPATIENT
Start: 2020-03-16 | End: 2020-12-14

## 2020-03-17 ENCOUNTER — TELEPHONE (OUTPATIENT)
Dept: PRIMARY CARE CLINIC | Age: 25
End: 2020-03-17

## 2020-04-07 RX ORDER — SERTRALINE HYDROCHLORIDE 25 MG/1
25 TABLET, FILM COATED ORAL DAILY
Qty: 90 TABLET | Refills: 3 | Status: SHIPPED | OUTPATIENT
Start: 2020-04-07 | End: 2021-03-17

## 2020-04-08 RX ORDER — LEVOTHYROXINE SODIUM 0.05 MG/1
50 TABLET ORAL DAILY
Qty: 30 TABLET | Refills: 3 | Status: SHIPPED | OUTPATIENT
Start: 2020-04-08 | End: 2020-07-02

## 2020-04-08 NOTE — TELEPHONE ENCOUNTER
Received fax from pharmacy requesting refill on pts medication(s). Pt was last seen in office on 3/13/2020  and has a follow up scheduled for Visit date not found. [facility].      Requested Prescriptions     Pending Prescriptions Disp Refills    levothyroxine (SYNTHROID) 50 MCG tablet [Pharmacy Med Name: LEVOTHYROXINE 50 MCG TABLET] 30 tablet 0     Sig: TAKE 1 TABLET BY MOUTH DAILY

## 2020-05-22 RX ORDER — METOPROLOL SUCCINATE 100 MG/1
100 TABLET, EXTENDED RELEASE ORAL DAILY
Qty: 90 TABLET | Refills: 3 | Status: SHIPPED | OUTPATIENT
Start: 2020-05-22 | End: 2021-04-13 | Stop reason: SDUPTHER

## 2020-06-08 RX ORDER — SPIRONOLACTONE 25 MG/1
25 TABLET ORAL 2 TIMES DAILY
Qty: 180 TABLET | Refills: 3 | Status: SHIPPED | OUTPATIENT
Start: 2020-06-08 | End: 2021-04-13 | Stop reason: SDUPTHER

## 2020-06-08 NOTE — TELEPHONE ENCOUNTER
Received fax from pharmacy requesting refill on pts medication(s). Pt was last seen in office on 3/13/2020  and has a follow up scheduled for Visit date not found. Will send request to  Dr. Monik Fay  for patient.      Requested Prescriptions     Pending Prescriptions Disp Refills    spironolactone (ALDACTONE) 25 MG tablet [Pharmacy Med Name: SPIRONOLACTONE 25 MG TABLET] 180 tablet 3     Sig: TAKE 1 TABLET BY MOUTH TWICE A DAY

## 2020-07-02 RX ORDER — LEVOTHYROXINE SODIUM 0.05 MG/1
50 TABLET ORAL DAILY
Qty: 90 TABLET | Refills: 1 | Status: SHIPPED | OUTPATIENT
Start: 2020-07-02 | End: 2021-01-06

## 2020-07-07 RX ORDER — MELOXICAM 15 MG/1
15 TABLET ORAL DAILY
Qty: 90 TABLET | Refills: 1 | Status: SHIPPED | OUTPATIENT
Start: 2020-07-07 | End: 2021-01-05

## 2020-11-18 RX ORDER — PANTOPRAZOLE SODIUM 40 MG/1
TABLET, DELAYED RELEASE ORAL
Qty: 90 TABLET | Refills: 1 | Status: SHIPPED | OUTPATIENT
Start: 2020-11-18 | End: 2021-04-13 | Stop reason: SDUPTHER

## 2020-11-18 NOTE — TELEPHONE ENCOUNTER
Received fax from pharmacy requesting refill on pts medication(s). Pt was last seen in office on 3/13/2020  and has a follow up scheduled for Visit date not found. Will send request to  Dr. Yung Leiva  for patient.      Requested Prescriptions     Pending Prescriptions Disp Refills    pantoprazole (PROTONIX) 40 MG tablet 90 tablet 3     Sig: TAKE 1 TABLET BY MOUTH EVERY DAY

## 2020-12-14 RX ORDER — NORETHINDRONE AND ETHINYL ESTRADIOL 7 DAYS X 3
1 KIT ORAL DAILY
Qty: 84 TABLET | Refills: 0 | Status: SHIPPED | OUTPATIENT
Start: 2020-12-14 | End: 2021-04-08

## 2020-12-14 NOTE — TELEPHONE ENCOUNTER
Received fax from pharmacy requesting refill on pts medication(s). Pt was last seen in office on 3/13/2020  and has a follow up scheduled for Visit date not found. Will send request to  Dr. Corey Morrell  for patient.      Requested Prescriptions     Pending Prescriptions Disp Refills    NORTREL 7/7/7 0.5/0.75/1-35 MG-MCG per tablet [Pharmacy Med Name: Robb Carminee 7-7-7-28 TABLET] 84 tablet 3     Sig: TAKE 1 TABLET BY MOUTH EVERY DAY

## 2021-01-04 DIAGNOSIS — M79.672 FOOT PAIN, LEFT: ICD-10-CM

## 2021-01-05 RX ORDER — MELOXICAM 15 MG/1
15 TABLET ORAL DAILY
Qty: 90 TABLET | Refills: 0 | Status: SHIPPED | OUTPATIENT
Start: 2021-01-05 | End: 2021-03-29

## 2021-01-05 NOTE — TELEPHONE ENCOUNTER
Received fax from pharmacy requesting refill on pts medication(s). Pt was last seen in office on 3/13/2020  and has a follow up scheduled for Visit date not found. Will send request to  Dr. Osullivan Call  for patient.      Requested Prescriptions     Pending Prescriptions Disp Refills    meloxicam (MOBIC) 15 MG tablet [Pharmacy Med Name: MELOXICAM 15 MG TABLET] 90 tablet 1     Sig: TAKE 1 TABLET BY MOUTH EVERY DAY

## 2021-01-06 DIAGNOSIS — E03.9 ACQUIRED HYPOTHYROIDISM: ICD-10-CM

## 2021-01-06 RX ORDER — LEVOTHYROXINE SODIUM 0.05 MG/1
50 TABLET ORAL DAILY
Qty: 90 TABLET | Refills: 1 | Status: SHIPPED | OUTPATIENT
Start: 2021-01-06 | End: 2021-04-13 | Stop reason: SDUPTHER

## 2021-01-06 NOTE — TELEPHONE ENCOUNTER
Received fax from pharmacy requesting refill on pts medication(s). Pt was last seen in office on 3/13/2020  and has a follow up scheduled for Visit date not found. Will send request to  Dr. Анна Nicole  for authorization.      Requested Prescriptions     Pending Prescriptions Disp Refills    levothyroxine (SYNTHROID) 50 MCG tablet [Pharmacy Med Name: LEVOTHYROXINE 50 MCG TABLET] 90 tablet 1     Sig: Take 1 tablet by mouth Daily

## 2021-01-30 DIAGNOSIS — I10 ESSENTIAL HYPERTENSION: ICD-10-CM

## 2021-02-01 RX ORDER — HYDROCHLOROTHIAZIDE 12.5 MG/1
12.5 TABLET ORAL DAILY
Qty: 30 TABLET | Refills: 0 | Status: SHIPPED | OUTPATIENT
Start: 2021-02-01 | End: 2021-02-23

## 2021-02-01 NOTE — TELEPHONE ENCOUNTER
Received fax from pharmacy requesting refill on pts medication(s). Pt was last seen in office on 3/13/2020  and has a follow up scheduled for Visit date not found. Will send request to  Dr. Arabella Izaguirre  for patient.      Requested Prescriptions     Pending Prescriptions Disp Refills    hydroCHLOROthiazide (HYDRODIURIL) 12.5 MG tablet [Pharmacy Med Name: HYDROCHLOROTHIAZIDE 12.5 MG TB] 90 tablet 3     Sig: TAKE 1 TABLET BY MOUTH EVERY DAY

## 2021-02-23 DIAGNOSIS — I10 ESSENTIAL HYPERTENSION: ICD-10-CM

## 2021-02-23 RX ORDER — HYDROCHLOROTHIAZIDE 12.5 MG/1
12.5 TABLET ORAL DAILY
Qty: 30 TABLET | Refills: 0 | Status: SHIPPED | OUTPATIENT
Start: 2021-02-23 | End: 2021-03-22 | Stop reason: SDUPTHER

## 2021-02-23 NOTE — TELEPHONE ENCOUNTER
Received fax from pharmacy requesting refill on pts medication(s). Pt was last seen in office on 3/13/2020  and has a follow up scheduled for Visit date not found. Will send request to  Dr. Croky Garcia  for patient.      Requested Prescriptions     Pending Prescriptions Disp Refills    hydroCHLOROthiazide (HYDRODIURIL) 12.5 MG tablet [Pharmacy Med Name: HYDROCHLOROTHIAZIDE 12.5 MG TB] 30 tablet 0     Sig: TAKE 1 TABLET BY MOUTH DAILY (NEEDS AN APPT BEFORE FURTHER REFILLS)

## 2021-03-05 ENCOUNTER — OFFICE VISIT (OUTPATIENT)
Dept: PRIMARY CARE CLINIC | Age: 26
End: 2021-03-05
Payer: COMMERCIAL

## 2021-03-05 VITALS
DIASTOLIC BLOOD PRESSURE: 88 MMHG | WEIGHT: 265.5 LBS | BODY MASS INDEX: 45.33 KG/M2 | HEIGHT: 64 IN | HEART RATE: 88 BPM | OXYGEN SATURATION: 98 % | TEMPERATURE: 96.8 F | SYSTOLIC BLOOD PRESSURE: 136 MMHG

## 2021-03-05 DIAGNOSIS — I10 ESSENTIAL HYPERTENSION: ICD-10-CM

## 2021-03-05 DIAGNOSIS — F98.8 ATTENTION DEFICIT DISORDER (ADD) WITHOUT HYPERACTIVITY: Primary | ICD-10-CM

## 2021-03-05 DIAGNOSIS — F40.10 SOCIAL ANXIETY DISORDER: ICD-10-CM

## 2021-03-05 DIAGNOSIS — Z13.31 POSITIVE DEPRESSION SCREENING: ICD-10-CM

## 2021-03-05 PROCEDURE — G8431 POS CLIN DEPRES SCRN F/U DOC: HCPCS | Performed by: NURSE PRACTITIONER

## 2021-03-05 PROCEDURE — 99213 OFFICE O/P EST LOW 20 MIN: CPT | Performed by: NURSE PRACTITIONER

## 2021-03-05 RX ORDER — ATOMOXETINE 40 MG/1
40 CAPSULE ORAL DAILY
Qty: 30 CAPSULE | Refills: 3 | Status: SHIPPED | OUTPATIENT
Start: 2021-03-05 | End: 2021-03-29 | Stop reason: SDUPTHER

## 2021-03-05 ASSESSMENT — ENCOUNTER SYMPTOMS
DIARRHEA: 0
RHINORRHEA: 0
COUGH: 0
SHORTNESS OF BREATH: 0
EYE REDNESS: 0
SORE THROAT: 0
VOMITING: 0
CONSTIPATION: 0

## 2021-03-05 ASSESSMENT — PATIENT HEALTH QUESTIONNAIRE - PHQ9
SUM OF ALL RESPONSES TO PHQ9 QUESTIONS 1 & 2: 3
1. LITTLE INTEREST OR PLEASURE IN DOING THINGS: 2
SUM OF ALL RESPONSES TO PHQ QUESTIONS 1-9: 10
2. FEELING DOWN, DEPRESSED OR HOPELESS: 1
6. FEELING BAD ABOUT YOURSELF - OR THAT YOU ARE A FAILURE OR HAVE LET YOURSELF OR YOUR FAMILY DOWN: 2
4. FEELING TIRED OR HAVING LITTLE ENERGY: 1
7. TROUBLE CONCENTRATING ON THINGS, SUCH AS READING THE NEWSPAPER OR WATCHING TELEVISION: 3
SUM OF ALL RESPONSES TO PHQ QUESTIONS 1-9: 10

## 2021-03-05 ASSESSMENT — COLUMBIA-SUICIDE SEVERITY RATING SCALE - C-SSRS: 1. WITHIN THE PAST MONTH, HAVE YOU WISHED YOU WERE DEAD OR WISHED YOU COULD GO TO SLEEP AND NOT WAKE UP?: NO

## 2021-03-05 NOTE — PROGRESS NOTES
Tashi March (:  1995) is a 31 y.o. female,Established patient, here for evaluation of the following chief complaint(s):  Depression and Anxiety      ASSESSMENT/PLAN:  1. Attention deficit disorder (ADD) without hyperactivity  -     atomoxetine (STRATTERA) 40 MG capsule; Take 1 capsule by mouth daily, Disp-30 capsule, R-3Normal    2. Essential hypertension--The current medical regimen is effective;  continue present plan and medications. 3. Social anxiety disorder--continue Zolft      Return in about 1 month (around 2021), or if symptoms worsen or fail to improve, for Physical.    SUBJECTIVE/OBJECTIVE:  HPI     HTN:  125/78 at home. \"I feel like it is always high when I come to the doctor. \"  She continues on HCTZ 12.5 mg, Aldactone 25 mg BID, and Toprol  mg. Overall, she is tolerating this regimen. Denies dizziness, lightheadedness or headaches. MOODS:  \"I have been having a lot of anxiety lately. \"  \"I am having a hard time concentrating while I am at work. \"  \"In 5th grade, they told me I had ADD. \"   \"I thought I grew out of it. \"  \"When I get home from work, I worry that I did not get enough done at work. I worry about it all night. \"  Reports good motivation. \"I just have a hard time concentrating and getting it all done. \"    Review of Systems   Constitutional: Negative for chills, fatigue and fever. HENT: Negative for congestion, ear pain, rhinorrhea and sore throat. Eyes: Negative for redness. Respiratory: Negative for cough and shortness of breath. Cardiovascular: Negative for chest pain. Gastrointestinal: Negative for constipation, diarrhea and vomiting. Skin: Negative for rash. Neurological: Negative for dizziness and headaches. Psychiatric/Behavioral: Positive for decreased concentration. The patient is nervous/anxious. Physical Exam  Vitals signs reviewed. Constitutional:       Appearance: Normal appearance. She is well-developed.    HENT:      Head: Normocephalic. Right Ear: Tympanic membrane and external ear normal.      Left Ear: Tympanic membrane and external ear normal.      Nose: Nose normal.   Eyes:      General:         Right eye: No discharge. Left eye: No discharge. Neck:      Musculoskeletal: Normal range of motion. Cardiovascular:      Rate and Rhythm: Normal rate and regular rhythm. Pulmonary:      Effort: Pulmonary effort is normal.      Breath sounds: Normal breath sounds. No wheezing, rhonchi or rales. Abdominal:      General: Bowel sounds are normal.      Palpations: Abdomen is soft. Musculoskeletal: Normal range of motion. Skin:     General: Skin is dry. Neurological:      General: No focal deficit present. Mental Status: She is alert and oriented to person, place, and time. Mental status is at baseline. Psychiatric:         Mood and Affect: Mood normal.         Behavior: Behavior normal.         Thought Content: Thought content normal.         Judgment: Judgment normal.           An electronic signature was used to authenticate this note.     --JUAN Ann

## 2021-03-17 DIAGNOSIS — F40.10 SOCIAL ANXIETY DISORDER: ICD-10-CM

## 2021-03-17 RX ORDER — SERTRALINE HYDROCHLORIDE 25 MG/1
25 TABLET, FILM COATED ORAL DAILY
Qty: 90 TABLET | Refills: 3 | Status: SHIPPED | OUTPATIENT
Start: 2021-03-17 | End: 2021-04-13 | Stop reason: SDUPTHER

## 2021-03-17 NOTE — TELEPHONE ENCOUNTER
Received fax from pharmacy requesting refill on pts medication(s). Pt was last seen in office on 3/5/2021  and has a follow up scheduled for 4/13/2021. Will send request to  Dr. Bob Kevin  for patient.      Requested Prescriptions     Pending Prescriptions Disp Refills    sertraline (ZOLOFT) 25 MG tablet [Pharmacy Med Name: SERTRALINE HCL 25 MG TABLET] 90 tablet 3     Sig: TAKE 1 TABLET BY MOUTH EVERY DAY

## 2021-03-22 DIAGNOSIS — I10 ESSENTIAL HYPERTENSION: ICD-10-CM

## 2021-03-22 NOTE — TELEPHONE ENCOUNTER
Received fax from pharmacy requesting refill on pts medication(s). Pt was last seen in office on 3/5/2021  and has a follow up scheduled for 4/13/2021. Will send request to  [facility]  for patient.      Requested Prescriptions     Pending Prescriptions Disp Refills    hydroCHLOROthiazide (HYDRODIURIL) 12.5 MG tablet 90 tablet 3     Sig: Take 1 tablet by mouth daily

## 2021-03-23 RX ORDER — HYDROCHLOROTHIAZIDE 12.5 MG/1
12.5 TABLET ORAL DAILY
Qty: 90 TABLET | Refills: 3 | Status: SHIPPED | OUTPATIENT
Start: 2021-03-23 | End: 2021-04-13 | Stop reason: SDUPTHER

## 2021-03-29 DIAGNOSIS — F98.8 ATTENTION DEFICIT DISORDER (ADD) WITHOUT HYPERACTIVITY: ICD-10-CM

## 2021-03-29 DIAGNOSIS — M79.672 FOOT PAIN, LEFT: ICD-10-CM

## 2021-03-29 RX ORDER — ATOMOXETINE 40 MG/1
40 CAPSULE ORAL DAILY
Qty: 90 CAPSULE | Refills: 1 | Status: SHIPPED | OUTPATIENT
Start: 2021-03-29 | End: 2021-04-13 | Stop reason: SDUPTHER

## 2021-03-29 RX ORDER — MELOXICAM 15 MG/1
15 TABLET ORAL DAILY
Qty: 90 TABLET | Refills: 1 | Status: SHIPPED | OUTPATIENT
Start: 2021-03-29 | End: 2021-04-13 | Stop reason: SDUPTHER

## 2021-03-29 NOTE — TELEPHONE ENCOUNTER
Received fax from pharmacy requesting refill on pts medication(s). Pt was last seen in office on 3/5/2021  and has a follow up scheduled for 4/13/2021. Will send request to  [facility]  for patient.      Requested Prescriptions     Pending Prescriptions Disp Refills    meloxicam (MOBIC) 15 MG tablet [Pharmacy Med Name: MELOXICAM 15 MG TABLET] 90 tablet 0     Sig: TAKE 1 TABLET BY MOUTH DAILY (MUST BE SEEN AND HAVE LABS BEFORE FURTHER REFILLS)    atomoxetine (STRATTERA) 40 MG capsule 30 capsule 3     Sig: Take 1 capsule by mouth daily

## 2021-04-08 DIAGNOSIS — Z30.8 ENCOUNTER FOR OTHER CONTRACEPTIVE MANAGEMENT: ICD-10-CM

## 2021-04-08 RX ORDER — NORETHINDRONE AND ETHINYL ESTRADIOL 7 DAYS X 3
1 KIT ORAL DAILY
Qty: 84 TABLET | Refills: 0 | Status: SHIPPED | OUTPATIENT
Start: 2021-04-08 | End: 2021-04-13 | Stop reason: SDUPTHER

## 2021-04-08 NOTE — TELEPHONE ENCOUNTER
Received fax from pharmacy requesting refill on pts medication(s). Pt was last seen in office on 3/5/2021  and has a follow up scheduled for 4/13/2021. Will send request to  [facility]  for authorization.      Requested Prescriptions     Pending Prescriptions Disp Refills    norethindrone-ethinyl estradiol (NORTREL 7/7/7) 0.5/0.75/1-35 MG-MCG per tablet [Pharmacy Med Name: Crispin La 7-7-7-28 TABLET] 84 tablet 0     Sig: Take 1 tablet by mouth daily

## 2021-04-13 ENCOUNTER — OFFICE VISIT (OUTPATIENT)
Dept: PRIMARY CARE CLINIC | Age: 26
End: 2021-04-13
Payer: COMMERCIAL

## 2021-04-13 VITALS
DIASTOLIC BLOOD PRESSURE: 78 MMHG | HEART RATE: 93 BPM | HEIGHT: 64 IN | OXYGEN SATURATION: 97 % | TEMPERATURE: 96.6 F | BODY MASS INDEX: 45.24 KG/M2 | SYSTOLIC BLOOD PRESSURE: 120 MMHG | WEIGHT: 265 LBS

## 2021-04-13 DIAGNOSIS — J30.1 SEASONAL ALLERGIC RHINITIS DUE TO POLLEN: ICD-10-CM

## 2021-04-13 DIAGNOSIS — M79.672 FOOT PAIN, LEFT: ICD-10-CM

## 2021-04-13 DIAGNOSIS — Z30.41 ENCOUNTER FOR SURVEILLANCE OF CONTRACEPTIVE PILLS: ICD-10-CM

## 2021-04-13 DIAGNOSIS — I10 ESSENTIAL HYPERTENSION: ICD-10-CM

## 2021-04-13 DIAGNOSIS — L68.0 HIRSUTISM: ICD-10-CM

## 2021-04-13 DIAGNOSIS — E78.2 MIXED HYPERLIPIDEMIA: ICD-10-CM

## 2021-04-13 DIAGNOSIS — R10.13 MIDEPIGASTRIC PAIN: ICD-10-CM

## 2021-04-13 DIAGNOSIS — E55.9 VITAMIN D DEFICIENCY: ICD-10-CM

## 2021-04-13 DIAGNOSIS — F98.8 ATTENTION DEFICIT DISORDER (ADD) WITHOUT HYPERACTIVITY: ICD-10-CM

## 2021-04-13 DIAGNOSIS — Z00.00 ENCOUNTER FOR PREVENTIVE HEALTH EXAMINATION: Primary | ICD-10-CM

## 2021-04-13 DIAGNOSIS — E03.9 ACQUIRED HYPOTHYROIDISM: ICD-10-CM

## 2021-04-13 DIAGNOSIS — F40.10 SOCIAL ANXIETY DISORDER: ICD-10-CM

## 2021-04-13 PROCEDURE — 99395 PREV VISIT EST AGE 18-39: CPT | Performed by: NURSE PRACTITIONER

## 2021-04-13 RX ORDER — SERTRALINE HYDROCHLORIDE 25 MG/1
25 TABLET, FILM COATED ORAL DAILY
Qty: 90 TABLET | Refills: 3 | Status: SHIPPED | OUTPATIENT
Start: 2021-04-13 | End: 2022-07-05

## 2021-04-13 RX ORDER — PANTOPRAZOLE SODIUM 40 MG/1
TABLET, DELAYED RELEASE ORAL
Qty: 90 TABLET | Refills: 3 | Status: SHIPPED | OUTPATIENT
Start: 2021-04-13 | End: 2022-05-09

## 2021-04-13 RX ORDER — LEVOTHYROXINE SODIUM 0.05 MG/1
50 TABLET ORAL DAILY
Qty: 90 TABLET | Refills: 3 | Status: SHIPPED | OUTPATIENT
Start: 2021-04-13 | End: 2022-06-27

## 2021-04-13 RX ORDER — SPIRONOLACTONE 25 MG/1
25 TABLET ORAL 2 TIMES DAILY
Qty: 180 TABLET | Refills: 3 | Status: SHIPPED | OUTPATIENT
Start: 2021-04-13 | End: 2022-06-27

## 2021-04-13 RX ORDER — ATORVASTATIN CALCIUM 40 MG/1
40 TABLET, FILM COATED ORAL NIGHTLY
Qty: 90 TABLET | Refills: 3 | Status: SHIPPED | OUTPATIENT
Start: 2021-04-13 | End: 2022-04-08

## 2021-04-13 RX ORDER — METOPROLOL SUCCINATE 100 MG/1
100 TABLET, EXTENDED RELEASE ORAL DAILY
Qty: 90 TABLET | Refills: 3 | Status: SHIPPED | OUTPATIENT
Start: 2021-04-13 | End: 2022-05-09

## 2021-04-13 RX ORDER — MELOXICAM 15 MG/1
15 TABLET ORAL DAILY
Qty: 90 TABLET | Refills: 3 | Status: SHIPPED | OUTPATIENT
Start: 2021-04-13 | End: 2022-06-27

## 2021-04-13 RX ORDER — NORETHINDRONE AND ETHINYL ESTRADIOL 7 DAYS X 3
1 KIT ORAL DAILY
Qty: 84 TABLET | Refills: 3 | Status: SHIPPED | OUTPATIENT
Start: 2021-04-13 | End: 2022-06-21

## 2021-04-13 RX ORDER — HYDROCHLOROTHIAZIDE 12.5 MG/1
12.5 TABLET ORAL DAILY
Qty: 90 TABLET | Refills: 3 | Status: SHIPPED | OUTPATIENT
Start: 2021-04-13 | End: 2022-07-05

## 2021-04-13 RX ORDER — FLUTICASONE PROPIONATE 50 MCG
2 SPRAY, SUSPENSION (ML) NASAL DAILY
Qty: 3 BOTTLE | Refills: 3 | Status: SHIPPED | OUTPATIENT
Start: 2021-04-13 | End: 2021-09-22

## 2021-04-13 RX ORDER — ATOMOXETINE 40 MG/1
40 CAPSULE ORAL DAILY
Qty: 90 CAPSULE | Refills: 3 | Status: SHIPPED | OUTPATIENT
Start: 2021-04-13 | End: 2022-06-27

## 2021-04-13 SDOH — ECONOMIC STABILITY: FOOD INSECURITY: WITHIN THE PAST 12 MONTHS, THE FOOD YOU BOUGHT JUST DIDN'T LAST AND YOU DIDN'T HAVE MONEY TO GET MORE.: NEVER TRUE

## 2021-04-13 ASSESSMENT — ENCOUNTER SYMPTOMS
COUGH: 0
DIARRHEA: 0
EYE REDNESS: 0
SORE THROAT: 0
CONSTIPATION: 0
VOMITING: 0
SHORTNESS OF BREATH: 0
ABDOMINAL PAIN: 0
RHINORRHEA: 0

## 2021-04-13 NOTE — PROGRESS NOTES
2021    Linette Chung (:  1995) is a 31 y.o. female, here for a preventive medicine evaluation. Patient Active Problem List   Diagnosis    Hypertension    Hypothyroidism    Allergic rhinitis    Mixed hyperlipidemia     ADD:   Improved with Strattera 40 mg. She is now able to get through her tasks at work. Her motivation remains good. ANXIETY:  Well controlled with Zoloft. HTN:  Well controlled at home. She continues on HCTZ 12.5 mg, Aldactone 25 mg BID and Toprol 100 mg. Denies dizziness, lightheadedness or unexplained headaches. Review of Systems   Constitutional: Negative for chills, fatigue and fever. HENT: Negative for congestion, ear pain, rhinorrhea and sore throat. Eyes: Negative for redness. Respiratory: Negative for cough and shortness of breath. Cardiovascular: Negative for chest pain and palpitations. Gastrointestinal: Negative for abdominal pain, constipation, diarrhea and vomiting. Genitourinary: Negative for dysuria, frequency, menstrual problem (normal on birth control) and urgency. Skin: Negative for rash. Neurological: Negative for dizziness and headaches. Psychiatric/Behavioral: Positive for decreased concentration (improved with Strattera). The patient is nervous/anxious (well controlled on current regimen). Prior to Visit Medications    Medication Sig Taking?  Authorizing Provider   norethindrone-ethinyl estradiol (NORTREL ) 0.5/0.75/1-35 MG-MCG per tablet Take 1 tablet by mouth daily Yes JUAN Mills   meloxicam (MOBIC) 15 MG tablet Take 1 tablet by mouth daily Yes JUAN Mills   atomoxetine (STRATTERA) 40 MG capsule Take 1 capsule by mouth daily Yes JUAN Mills   hydroCHLOROthiazide (HYDRODIURIL) 12.5 MG tablet Take 1 tablet by mouth daily Yes JUAN Mills   sertraline (ZOLOFT) 25 MG tablet Take 1 tablet by mouth daily Yes JUAN Mills   levothyroxine (SYNTHROID) 50 MCG tablet Take 1 tablet by mouth Daily Yes JUAN Mills   pantoprazole (PROTONIX) 40 MG tablet TAKE 1 TABLET BY MOUTH EVERY DAY Yes JUAN Mills   spironolactone (ALDACTONE) 25 MG tablet Take 1 tablet by mouth 2 times daily Yes JUAN Mills   metoprolol succinate (TOPROL XL) 100 MG extended release tablet Take 1 tablet by mouth daily Yes JUAN Mills   fluticasone (FLONASE) 50 MCG/ACT nasal spray 2 sprays by Nasal route daily Yes JUAN Mills   atorvastatin (LIPITOR) 40 MG tablet Take 1 tablet by mouth nightly Yes JUAN Mills   loratadine (EQ ALLERGY RELIEF) 10 MG tablet Take 1 tablet by mouth daily Yes JUAN Gauthier        No Known Allergies    Past Medical History:   Diagnosis Date    Allergic rhinitis     GERD (gastroesophageal reflux disease)     Hyperlipidemia     Hypertension     Hypothyroidism        Past Surgical History:   Procedure Laterality Date    CYST REMOVAL         Social History     Socioeconomic History    Marital status: Single     Spouse name: Not on file    Number of children: Not on file    Years of education: Not on file    Highest education level: Not on file   Occupational History    Not on file   Social Needs    Financial resource strain: Not hard at all    Food insecurity     Worry: Never true     Inability: Never true   [employer] needs     Medical: Not on file     Non-medical: Not on file   Tobacco Use    Smoking status: Never Smoker    Smokeless tobacco: Never Used   Substance and Sexual Activity    Alcohol use: No    Drug use: Not on file    Sexual activity: Not on file   Lifestyle    Physical activity     Days per week: Not on file     Minutes per session: Not on file    Stress: Not on file   Relationships    Social connections     Talks on phone: Not on file     Gets together: Not on file     Attends Voodoo service: Not on file     Active member of club or organization: Not on file     Attends meetings of clubs or organizations: Not on file     Relationship status: Not on file    Intimate partner violence     Fear of current or ex partner: Not on file     Emotionally abused: Not on file     Physically abused: Not on file     Forced sexual activity: Not on file   Other Topics Concern    Not on file   Social History Narrative    Not on file        Family History   Problem Relation Age of Onset    Diabetes Paternal Grandmother        ADVANCE DIRECTIVE: N, <no information>    Vitals:    04/13/21 1541   BP: 120/78   Site: Right Upper Arm   Position: Sitting   Cuff Size: Large Adult   Pulse: 93   Temp: 96.6 °F (35.9 °C)   TempSrc: Infrared   SpO2: 97%   Weight: 265 lb (120.2 kg)   Height: 5' 4\" (1.626 m)     Estimated body mass index is 45.49 kg/m² as calculated from the following:    Height as of this encounter: 5' 4\" (1.626 m). Weight as of this encounter: 265 lb (120.2 kg). Physical Exam  Vitals signs reviewed. Constitutional:       Appearance: Normal appearance. She is well-developed and normal weight. HENT:      Head: Normocephalic. Right Ear: Tympanic membrane and external ear normal.      Left Ear: Tympanic membrane and external ear normal.      Nose: Nose normal.   Eyes:      General:         Right eye: No discharge. Left eye: No discharge. Neck:      Musculoskeletal: Normal range of motion. Cardiovascular:      Rate and Rhythm: Normal rate and regular rhythm. Pulmonary:      Effort: Pulmonary effort is normal.      Breath sounds: Normal breath sounds. No wheezing, rhonchi or rales. Abdominal:      General: Bowel sounds are normal.      Palpations: Abdomen is soft. Musculoskeletal: Normal range of motion. Skin:     General: Skin is dry. Neurological:      General: No focal deficit present. Mental Status: She is alert and oriented to person, place, and time. Mental status is at baseline.    Psychiatric:         Mood and Affect: Mood normal.         Behavior: Behavior normal. Thought Content: Thought content normal.         Judgment: Judgment normal.        No flowsheet data found. Lab Results   Component Value Date    CHOL 155 07/26/2018    CHOL 270 05/31/2016    CHOL 227 07/02/2015    TRIG 113 07/26/2018    TRIG 530 05/31/2016    TRIG 209 07/02/2015    HDL 66 07/26/2018    HDL 44 05/31/2016    HDL 61 07/02/2015    LDLCALC 66 07/26/2018    LDLCALC - 05/31/2016    GLUCOSE 82 03/13/2020       The ASCVD Risk score (Shun Montelongo et al., 2013) failed to calculate for the following reasons: The 2013 ASCVD risk score is only valid for ages 36 to 78    Immunization History   Administered Date(s) Administered    COVID-19, Pfizer, PF, 30mcg/0.3mL 01/31/2021, 02/24/2021    Influenza Virus Vaccine 09/21/2018       Health Maintenance   Topic Date Due    Hepatitis C screen  Never done    Varicella vaccine (1 of 2 - 2-dose childhood series) Never done    HPV vaccine (1 - 2-dose series) Never done    HIV screen  Never done    DTaP/Tdap/Td vaccine (1 - Tdap) Never done    Lipid screen  07/26/2019    TSH testing  03/13/2021    Potassium monitoring  03/13/2021    Creatinine monitoring  03/13/2021    Flu vaccine (Season Ended) 09/01/2021    Cervical cancer screen  03/14/2022    COVID-19 Vaccine  Completed    Hepatitis A vaccine  Aged Out    Hepatitis B vaccine  Aged Out    Hib vaccine  Aged Out    Meningococcal (ACWY) vaccine  Aged Out    Pneumococcal 0-64 years Vaccine  Aged Out       ASSESSMENT/PLAN:  1. Encounter for preventive health examination  2. Foot pain, left  -     meloxicam (MOBIC) 15 MG tablet; Take 1 tablet by mouth daily, Disp-90 tablet, R-3Normal  3. Attention deficit disorder (ADD) without hyperactivity  -     atomoxetine (STRATTERA) 40 MG capsule; Take 1 capsule by mouth daily, Disp-90 capsule, R-3Normal  4. Essential hypertension  -     hydroCHLOROthiazide (HYDRODIURIL) 12.5 MG tablet;  Take 1 tablet by mouth daily, Disp-90 tablet, R-3Normal  -     metoprolol

## 2021-09-16 NOTE — PATIENT INSTRUCTIONS
stop-smoking programs and medicines. These can increase your chances of quitting for good. When should you call for help? Call 911 anytime you think you may need emergency care. This may mean having symptoms that suggest that your blood pressure is causing a serious heart or blood vessel problem. Your blood pressure may be over 180/110.   For example, call 911 if:    · You have symptoms of a heart attack. These may include:  ¨ Chest pain or pressure, or a strange feeling in the chest.  ¨ Sweating. ¨ Shortness of breath. ¨ Nausea or vomiting. ¨ Pain, pressure, or a strange feeling in the back, neck, jaw, or upper belly or in one or both shoulders or arms. ¨ Lightheadedness or sudden weakness. ¨ A fast or irregular heartbeat.     · You have symptoms of a stroke. These may include:  ¨ Sudden numbness, tingling, weakness, or loss of movement in your face, arm, or leg, especially on only one side of your body. ¨ Sudden vision changes. ¨ Sudden trouble speaking. ¨ Sudden confusion or trouble understanding simple statements. ¨ Sudden problems with walking or balance. ¨ A sudden, severe headache that is different from past headaches.     · You have severe back or belly pain.    Do not wait until your blood pressure comes down on its own. Get help right away.   Call your doctor now or seek immediate care if:    · Your blood pressure is much higher than normal (such as 180/110 or higher), but you don't have symptoms.     · You think high blood pressure is causing symptoms, such as:  ¨ Severe headache. ¨ Blurry vision.    Watch closely for changes in your health, and be sure to contact your doctor if:    · Your blood pressure measures 140/90 or higher at least 2 times.  That means the top number is 140 or higher or the bottom number is 90 or higher, or both.     · You think you may be having side effects from your blood pressure medicine.     · Your blood pressure is usually normal, but it goes above normal at Patient is a 64y old  Female who presents with a chief complaint of AMS, DKA, Necrotic foot infection (16 Sep 2021 13:33)      Interval History: finger sticks are in 300s   increased Insulin Resistance   on Lantus 18 units and 5 units prandial lispro     MEDICATIONS  (STANDING):  aspirin enteric coated 81 milliGRAM(s) Oral daily  atorvastatin 40 milliGRAM(s) Oral at bedtime  chlorhexidine 2% Cloths 1 Application(s) Topical <User Schedule>  dextrose 40% Gel 15 Gram(s) Oral once  dextrose 40% Gel 15 Gram(s) Oral once  dextrose 5%. 1000 milliLiter(s) (50 mL/Hr) IV Continuous <Continuous>  dextrose 5%. 1000 milliLiter(s) (100 mL/Hr) IV Continuous <Continuous>  dextrose 5%. 1000 milliLiter(s) (50 mL/Hr) IV Continuous <Continuous>  dextrose 5%. 1000 milliLiter(s) (50 mL/Hr) IV Continuous <Continuous>  dextrose 5%. 1000 milliLiter(s) (100 mL/Hr) IV Continuous <Continuous>  dextrose 50% Injectable 25 Gram(s) IV Push once  dextrose 50% Injectable 12.5 Gram(s) IV Push once  dextrose 50% Injectable 25 Gram(s) IV Push once  enoxaparin Injectable 40 milliGRAM(s) SubCutaneous <User Schedule>  glucagon  Injectable 1 milliGRAM(s) IntraMuscular once  glucagon  Injectable 1 milliGRAM(s) IntraMuscular once  insulin glargine Injectable (LANTUS) 18 Unit(s) SubCutaneous at bedtime  insulin lispro (ADMELOG) corrective regimen sliding scale   SubCutaneous three times a day before meals  insulin lispro (ADMELOG) corrective regimen sliding scale   SubCutaneous at bedtime  insulin lispro Injectable (ADMELOG) 5 Unit(s) SubCutaneous before breakfast  insulin lispro Injectable (ADMELOG) 5 Unit(s) SubCutaneous before lunch  insulin lispro Injectable (ADMELOG) 5 Unit(s) SubCutaneous before dinner  metoprolol tartrate 12.5 milliGRAM(s) Oral every 12 hours  piperacillin/tazobactam IVPB.. 3.375 Gram(s) IV Intermittent every 8 hours  potassium chloride    Tablet ER 40 milliEquivalent(s) Oral every 4 hours    MEDICATIONS  (PRN):  acetaminophen   Tablet .. 650 milliGRAM(s) Oral every 6 hours PRN Mild Pain (1 - 3)  fentaNYL    Injectable 25 MICROGram(s) IV Push every 6 hours PRN Severe Pain (7 - 10)      Allergies    avocado (Pruritus)  Carrots (Pruritus)  No Known Drug Allergies  Plums (Pruritus)    Intolerances        REVIEW OF SYSTEMS:  CONSTITUTIONAL: no changes  EYES: No eye pain, visual disturbances, or discharge  ENMT:  No difficulty hearing, No sinus or throat pain  NECK: No pain or stiffness  RESPIRATORY: No cough, wheezing, chills or hemoptysis; No shortness of breath  CARDIOVASCULAR: No chest pain, palpitations or leg swelling  GASTROINTESTINAL: No abdominal or epigastric pain. No nausea, vomiting, or hematemesis; No diarrhea or constipation. No melena or hematochezia.  GENITOURINARY: No dysuria, frequency, hematuria, or incontinence  NEUROLOGICAL: No headaches, memory loss, loss of strength, numbness, or tremors  SKIN: No itching, burning, rashes, or lesions   ENDOCRINE: No heat or cold intolerance; No hair loss  MUSCULOSKELETAL: No joint pain or swelling; No muscle, back, or extremity pain  PSYCHIATRIC: No depression, anxiety, mood swings, or difficulty sleeping  HEME/LYMPH: No easy bruising, or bleeding gums  ALLERY AND IMMUNOLOGIC: No hives or eczema    Vital Signs Last 24 Hrs  T(C): 37.4 (16 Sep 2021 17:12), Max: 37.4 (16 Sep 2021 12:13)  T(F): 99.3 (16 Sep 2021 17:12), Max: 99.4 (16 Sep 2021 12:13)  HR: 84 (16 Sep 2021 17:12) (76 - 94)  BP: 134/72 (16 Sep 2021 17:12) (126/73 - 139/66)  BP(mean): --  RR: 18 (16 Sep 2021 17:12) (17 - 18)  SpO2: 96% (16 Sep 2021 17:12) (96% - 99%)    PHYSICAL EXAM:  GENERAL:   HEAD: Atraumatic, Normocephalic  EYES: PERRLA, conjunctiva and sclera clear  ENMT: No  exudates,; Moist mucous membranes,, No lesions  NECK: Supple, No JVD, Normal thyroid  NERVOUS SYSTEM:  Alert & Oriented,   CHEST/LUNG: Clear to auscultation bilaterally; No rales, rhonchi, wheezing, or rubs  HEART: Regular rate and rhythm; No murmurs, rubs, or gallops  ABDOMEN: Soft, Nontender, Nondistended; Bowel sounds present  EXTREMITIES:  2+ Peripheral Pulses, no edema  SKIN: No rashes or lesions    LABS:        CAPILLARY BLOOD GLUCOSE      POCT Blood Glucose.: 275 mg/dL (16 Sep 2021 16:37)  POCT Blood Glucose.: 354 mg/dL (16 Sep 2021 11:15)  POCT Blood Glucose.: 347 mg/dL (16 Sep 2021 08:21)  POCT Blood Glucose.: 254 mg/dL (15 Sep 2021 21:19)    Lipid panel:           Thyroid:  Diabetes Tests:  Parathyroid Panel:  Adrenals:  RADIOLOGY & ADDITIONAL TESTS:    Imaging Personally Reviewed:  [ ] YES  [ ] NO    Consultant(s) Notes Reviewed:  [ ] YES  [ ] NO    Care Discussed with Consultants/Other Providers [ ] YES  [ ] NO

## 2021-09-22 ENCOUNTER — OFFICE VISIT (OUTPATIENT)
Dept: PRIMARY CARE CLINIC | Age: 26
End: 2021-09-22
Payer: COMMERCIAL

## 2021-09-22 VITALS
OXYGEN SATURATION: 98 % | DIASTOLIC BLOOD PRESSURE: 88 MMHG | SYSTOLIC BLOOD PRESSURE: 130 MMHG | TEMPERATURE: 96.4 F | BODY MASS INDEX: 45.61 KG/M2 | HEART RATE: 102 BPM | HEIGHT: 64 IN | WEIGHT: 267.13 LBS

## 2021-09-22 DIAGNOSIS — H69.82 DYSFUNCTION OF LEFT EUSTACHIAN TUBE: Primary | ICD-10-CM

## 2021-09-22 PROCEDURE — 99213 OFFICE O/P EST LOW 20 MIN: CPT | Performed by: NURSE PRACTITIONER

## 2021-09-22 RX ORDER — METHYLPREDNISOLONE 4 MG/1
TABLET ORAL
Qty: 1 KIT | Refills: 0 | Status: SHIPPED | OUTPATIENT
Start: 2021-09-22 | End: 2021-09-28

## 2021-09-22 RX ORDER — FLUTICASONE PROPIONATE 50 MCG
2 SPRAY, SUSPENSION (ML) NASAL DAILY
Qty: 16 G | Refills: 5 | Status: SHIPPED | OUTPATIENT
Start: 2021-09-22

## 2021-09-22 ASSESSMENT — ENCOUNTER SYMPTOMS
NAUSEA: 0
COUGH: 0
RHINORRHEA: 1
SORE THROAT: 1
VOMITING: 0
DIARRHEA: 0

## 2021-09-22 NOTE — PROGRESS NOTES
Renny Reeves (:  1995) is a 31 y.o. female,Established patient, here for evaluation of the following chief complaint(s):  Otalgia (left ear. Started last night. Pt has had a little bit of nasal congestion but not a whole lot)      ASSESSMENT/PLAN:    ICD-10-CM    1. Dysfunction of left eustachian tube  H69.82 fluticasone (FLONASE) 50 MCG/ACT nasal spray     methylPREDNISolone (MEDROL DOSEPACK) 4 MG tablet       Return if symptoms worsen or fail to improve. SUBJECTIVE/OBJECTIVE:  HPI     Reports left sided otalgia  Reports left sided fullness and pressure. Reports mild nasal congestion or drainage. Denies a fever. /88 (Site: Left Upper Arm, Position: Sitting, Cuff Size: Large Adult)   Pulse 102   Temp 96.4 °F (35.8 °C) (Temporal)   Ht 5' 4\" (1.626 m)   Wt 267 lb 2 oz (121.2 kg)   SpO2 98%   BMI 45.85 kg/m²     Review of Systems   Constitutional: Negative for fever. HENT: Positive for congestion, ear pain (left), rhinorrhea and sore throat (\"a little bit\"). Respiratory: Negative for cough. Gastrointestinal: Negative for diarrhea, nausea and vomiting. Neurological: Positive for headaches. Physical Exam  Vitals reviewed. Constitutional:       Appearance: She is well-developed. Comments: Overweight   HENT:      Head: Normocephalic. Right Ear: Tympanic membrane and external ear normal.      Left Ear: External ear normal. A middle ear effusion is present. Nose: Nose normal.   Eyes:      General:         Right eye: No discharge. Left eye: No discharge. Cardiovascular:      Rate and Rhythm: Normal rate and regular rhythm. Pulmonary:      Effort: Pulmonary effort is normal.      Breath sounds: Normal breath sounds. No wheezing, rhonchi or rales. Abdominal:      General: Bowel sounds are normal.      Palpations: Abdomen is soft. Musculoskeletal:      Cervical back: Normal range of motion. Skin:     General: Skin is dry.    Neurological:

## 2022-01-21 ENCOUNTER — OFFICE VISIT (OUTPATIENT)
Dept: INTERNAL MEDICINE | Facility: CLINIC | Age: 27
End: 2022-01-21

## 2022-01-21 VITALS
HEIGHT: 64 IN | DIASTOLIC BLOOD PRESSURE: 78 MMHG | BODY MASS INDEX: 45.75 KG/M2 | OXYGEN SATURATION: 99 % | RESPIRATION RATE: 16 BRPM | SYSTOLIC BLOOD PRESSURE: 138 MMHG | HEART RATE: 96 BPM | WEIGHT: 268 LBS | TEMPERATURE: 97.9 F

## 2022-01-21 DIAGNOSIS — M79.671 FOOT PAIN, RIGHT: Primary | ICD-10-CM

## 2022-01-21 PROCEDURE — 99203 OFFICE O/P NEW LOW 30 MIN: CPT

## 2022-01-21 RX ORDER — FLUTICASONE PROPIONATE 50 MCG
2 SPRAY, SUSPENSION (ML) NASAL
COMMUNITY
Start: 2021-09-22

## 2022-01-21 RX ORDER — PANTOPRAZOLE SODIUM 40 MG/1
40 TABLET, DELAYED RELEASE ORAL DAILY
COMMUNITY
Start: 2021-10-27

## 2022-01-21 RX ORDER — ATORVASTATIN CALCIUM 40 MG/1
40 TABLET, FILM COATED ORAL DAILY
COMMUNITY
Start: 2022-01-12

## 2022-01-21 RX ORDER — MELOXICAM 15 MG/1
15 TABLET ORAL DAILY
COMMUNITY
Start: 2022-01-14

## 2022-01-21 RX ORDER — HYDROCHLOROTHIAZIDE 12.5 MG/1
12.5 TABLET ORAL DAILY
COMMUNITY
Start: 2021-12-19

## 2022-01-21 RX ORDER — ATOMOXETINE 40 MG/1
40 CAPSULE ORAL DAILY
COMMUNITY
Start: 2022-01-15

## 2022-01-21 RX ORDER — SPIRONOLACTONE 25 MG/1
25 TABLET ORAL DAILY
COMMUNITY
Start: 2021-12-19

## 2022-01-21 RX ORDER — METOPROLOL SUCCINATE 100 MG/1
100 TABLET, EXTENDED RELEASE ORAL DAILY
COMMUNITY
Start: 2021-10-27

## 2022-01-21 RX ORDER — NORETHINDRONE AND ETHINYL ESTRADIOL 7 DAYS X 3
KIT ORAL
COMMUNITY
Start: 2022-01-06

## 2022-01-21 RX ORDER — LEVOTHYROXINE SODIUM 0.05 MG/1
50 TABLET ORAL DAILY
COMMUNITY
Start: 2021-12-19

## 2022-01-21 RX ORDER — SERTRALINE HYDROCHLORIDE 25 MG/1
25 TABLET, FILM COATED ORAL DAILY
COMMUNITY
Start: 2021-12-19 | End: 2023-01-05 | Stop reason: SDUPTHER

## 2022-01-21 NOTE — PROGRESS NOTES
Subjective     Chief Complaint   Patient presents with   • Foot Pain     Right Foot       History of Present Illness  Pt complains of pain in her right foot and she has to work 10 hours every day on it and it is hurting. Unsure if she injured it. Complains of pain at the ball of her foot that runs along the lateral side of her foot. She has had the pain going on a couple weeks now and has not gotten any better.  She has full range of motion regimen foot.  No bruising.  Patient's PMR from outside medical facility reviewed and noted.    Review of Systems   Constitutional: Negative for activity change, fatigue and unexpected weight change.   HENT: Negative for congestion, ear pain, mouth sores, sinus pressure and trouble swallowing.    Eyes: Negative for discharge and visual disturbance.   Respiratory: Negative for cough and shortness of breath.    Cardiovascular: Negative for chest pain and leg swelling.   Gastrointestinal: Negative for abdominal pain, constipation, diarrhea and nausea.   Genitourinary: Negative for decreased urine volume, difficulty urinating, dysuria and hematuria.   Musculoskeletal: Negative for back pain and gait problem.        Right foot pain   Skin: Negative for color change and rash.   Allergic/Immunologic: Negative for environmental allergies and immunocompromised state.   Neurological: Negative for weakness and headaches.   Psychiatric/Behavioral: Negative for confusion and sleep disturbance.        Otherwise complete ROS reviewed and negative except as mentioned in the HPI.    Past Medical History:   Past Medical History:   Diagnosis Date   • Anxiety    • Hyperlipidemia    • Hypertension    • Hypothyroidism      Past Surgical History:History reviewed. No pertinent surgical history.  Social History:  reports that she has never smoked. She has never used smokeless tobacco. She reports that she does not drink alcohol and does not use drugs.    Family History: family history includes No  "Known Problems in her father and mother.       Allergies:  No Known Allergies  Medications:  Prior to Admission medications    Medication Sig Start Date End Date Taking? Authorizing Provider   atomoxetine (STRATTERA) 40 MG capsule Take 40 mg by mouth Daily. 1/15/22  Yes Charo Alcaraz MD   atorvastatin (LIPITOR) 40 MG tablet Take 40 mg by mouth Daily. 1/12/22  Yes Charo Alcaraz MD   fluticasone (FLONASE) 50 MCG/ACT nasal spray 2 sprays. 9/22/21  Yes Charo Alcaraz MD   hydroCHLOROthiazide (HYDRODIURIL) 12.5 MG tablet Take 12.5 mg by mouth Daily. 12/19/21  Yes Charo Alcaraz MD   levothyroxine (SYNTHROID, LEVOTHROID) 50 MCG tablet Take 50 mcg by mouth Daily. 12/19/21  Yes Provider, Historical, MD   meloxicam (MOBIC) 15 MG tablet Take 15 mg by mouth Daily. 1/14/22  Yes Charo Alcaraz MD   metoprolol succinate XL (TOPROL-XL) 100 MG 24 hr tablet Take 100 mg by mouth Daily. 10/27/21  Yes Charo Alcaraz MD   Nortrel 7/7/7 0.5/0.75/1-35 MG-MCG per tablet  1/6/22  Yes Charo Alcaraz MD   pantoprazole (PROTONIX) 40 MG EC tablet Take 40 mg by mouth Daily. 10/27/21  Yes Charo Alcaraz MD   sertraline (ZOLOFT) 25 MG tablet Take 25 mg by mouth Daily. 12/19/21  Yes Provider, Historical, MD   spironolactone (ALDACTONE) 25 MG tablet Take 25 mg by mouth Daily. 12/19/21  Yes Charo Alcaraz MD       Objective     Vital Signs: /78 (BP Location: Right arm, Patient Position: Sitting, Cuff Size: Adult)   Pulse 96   Temp 97.9 °F (36.6 °C) (Skin)   Resp 16   Ht 162.6 cm (64\")   Wt 122 kg (268 lb)   SpO2 99%   BMI 46.00 kg/m²   Physical Exam  Constitutional:       General: She is not in acute distress.     Appearance: Normal appearance. She is normal weight. She is not ill-appearing.   HENT:      Head: Normocephalic and atraumatic.      Right Ear: External ear normal. There is no impacted cerumen.      Left Ear: External ear normal. There is no impacted cerumen.     "  Nose: Nose normal. No congestion or rhinorrhea.      Mouth/Throat:      Mouth: Mucous membranes are moist.      Pharynx: No posterior oropharyngeal erythema.   Eyes:      General: No scleral icterus.     Extraocular Movements: Extraocular movements intact.      Conjunctiva/sclera: Conjunctivae normal.      Pupils: Pupils are equal, round, and reactive to light.   Cardiovascular:      Rate and Rhythm: Normal rate and regular rhythm.      Pulses: Normal pulses.      Heart sounds: Normal heart sounds.   Pulmonary:      Effort: Pulmonary effort is normal. No respiratory distress.      Breath sounds: Normal breath sounds. No wheezing.   Abdominal:      General: Abdomen is flat. Bowel sounds are normal.      Palpations: Abdomen is soft.      Tenderness: There is no abdominal tenderness.   Musculoskeletal:         General: Tenderness present. No swelling, deformity or signs of injury. Normal range of motion.      Cervical back: Normal range of motion.      Right lower leg: No edema.      Left lower leg: No edema.      Comments: Right lateral foot pain. Pain is also present in the ball of patients foot. No swelling.    Skin:     General: Skin is warm and dry.      Capillary Refill: Capillary refill takes less than 2 seconds.      Findings: No erythema or rash.   Neurological:      General: No focal deficit present.      Mental Status: She is alert and oriented to person, place, and time. Mental status is at baseline.      Motor: No weakness.   Psychiatric:         Mood and Affect: Mood normal.         Behavior: Behavior normal.         Thought Content: Thought content normal.         Judgment: Judgment normal.       Patient's Body mass index is 46 kg/m². indicating that she is obese (BMI >30). Obesity-related health conditions include the following: none. Obesity is unchanged. BMI is is above average; no BMI management plan is appropriate. We discussed portion control and increasing exercise..      Results  Reviewed:  Glucose   Date Value Ref Range Status   03/13/2020 82 74 - 109 mg/dL Final     BUN   Date Value Ref Range Status   03/13/2020 16 6 - 20 mg/dL Final     Creatinine   Date Value Ref Range Status   03/13/2020 0.7 0.5 - 0.9 mg/dL Final     Sodium   Date Value Ref Range Status   03/13/2020 139 136 - 145 mmol/L Final     Potassium   Date Value Ref Range Status   03/13/2020 4.1 3.5 - 5.0 mmol/L Final     Chloride   Date Value Ref Range Status   03/13/2020 100 98 - 111 mmol/L Final     CO2   Date Value Ref Range Status   03/13/2020 23 22 - 29 mmol/L Final     Calcium   Date Value Ref Range Status   03/13/2020 9.8 8.6 - 10.0 mg/dL Final     ALT (SGPT)   Date Value Ref Range Status   03/13/2020 15 5 - 33 U/L Final     AST (SGOT)   Date Value Ref Range Status   03/13/2020 15 5 - 32 U/L Final     WBC   Date Value Ref Range Status   03/13/2020 10.2 4.8 - 10.8 K/uL Final     Hematocrit   Date Value Ref Range Status   03/13/2020 39.3 37.0 - 47.0 % Final     Platelets   Date Value Ref Range Status   03/13/2020 266 130 - 400 K/uL Final     Triglycerides   Date Value Ref Range Status   07/26/2018 113 0 - 149 mg/dL Final     HDL Cholesterol   Date Value Ref Range Status   07/26/2018 66 65 - 121 mg/dL Final     Comment:     VALUES>60 MG/DL ARE ASSOCIATED WITH A DECREASED RISK OF  ATHEROSCLEROTIC CARDIOVASCULAR DISEASE     LDL Cholesterol    Date Value Ref Range Status   07/26/2018 66 <100 mg/dL Final     Comment:     <100 MG/DL=OPITIMAL    100-129 MG/DL=DESIRABLE    130-159 MG/DL BORDERLINE=INCREASED RISK OF ATHEROSCLEROTIC  CARDIOVASCULAR DISEASE    > OR = 160 MG/DL=ASSOCIATED WITH AN INCREASE RISK OF  ATHEROSCLEROTIC CARDIOVASCULAR DISEASE         Assessment / Plan     Assessment/Plan:  1. Foot pain, right  - XR Foot 3+ View Right (In Office)  -Ambulatory Referral to Orthopedic Surgery         Discussed patient getting post op shoe until able to get new supportive shoes with insoles.  Discussed possibility of plantar  tendinitis or even plantar fasciitis.  Advised patient that after she tries walking with postop shoe for 1 week and getting supportive shoes with insoles pain is not any better to please follow-up and proceed with ambulatory O2 orthopedic surgery for further assessment.  Return in about 2 weeks (around 2/4/2022). unless patient needs to be seen sooner or acute issues arise.    Code Status: Full    I have discussed the patient results/orders and and plan/recommendation with them at today's visit.      Rosalie Colon, APRN   01/21/2022

## 2022-04-08 DIAGNOSIS — E78.2 MIXED HYPERLIPIDEMIA: ICD-10-CM

## 2022-04-08 RX ORDER — ATORVASTATIN CALCIUM 40 MG/1
TABLET, FILM COATED ORAL
Qty: 90 TABLET | Refills: 0 | Status: SHIPPED | OUTPATIENT
Start: 2022-04-08 | End: 2022-07-11

## 2022-04-08 NOTE — TELEPHONE ENCOUNTER
Requested Prescriptions     Pending Prescriptions Disp Refills    atorvastatin (LIPITOR) 40 MG tablet [Pharmacy Med Name: ATORVASTATIN 40 MG TABLET] 90 tablet 3     Sig: TAKE 1 TABLET BY MOUTH EVERY DAY AT NIGHT

## 2022-04-08 NOTE — TELEPHONE ENCOUNTER
Patient has not had lab work since 2020.     Gave a 90-day supply but no refills    Patient needs to obtain lab work fasting

## 2022-04-19 ENCOUNTER — OFFICE VISIT (OUTPATIENT)
Dept: INTERNAL MEDICINE | Facility: CLINIC | Age: 27
End: 2022-04-19

## 2022-04-19 VITALS
DIASTOLIC BLOOD PRESSURE: 86 MMHG | RESPIRATION RATE: 20 BRPM | BODY MASS INDEX: 46.44 KG/M2 | TEMPERATURE: 97 F | HEART RATE: 88 BPM | WEIGHT: 272 LBS | HEIGHT: 64 IN | SYSTOLIC BLOOD PRESSURE: 134 MMHG | OXYGEN SATURATION: 99 %

## 2022-04-19 DIAGNOSIS — M25.521 RIGHT ELBOW PAIN: Primary | ICD-10-CM

## 2022-04-19 PROCEDURE — 99213 OFFICE O/P EST LOW 20 MIN: CPT | Performed by: NURSE PRACTITIONER

## 2022-04-19 RX ORDER — TRAMADOL HYDROCHLORIDE 50 MG/1
50 TABLET ORAL EVERY 6 HOURS PRN
Qty: 12 TABLET | Refills: 0 | Status: SHIPPED | OUTPATIENT
Start: 2022-04-19

## 2022-04-19 NOTE — PROGRESS NOTES
Subjective     Chief Complaint   Patient presents with   • Elbow Injury     Pt states her arm is still hurting her       History of Present Illness  Patient presents today for follow up on a elbow and arm pain post fall getting in shower on Saturday. She went to [facility] and had Xrays performed. Reported to be negative. Complains of swelling, tingling and numbness. She was placed in a sling. She is not ablel to pronate or supinate without pain. States is actually feels worse.     Review of Systems   Constitutional: Positive for activity change.   Musculoskeletal: Positive for joint swelling.   Neurological: Positive for numbness.      Otherwise complete ROS reviewed and negative except as mentioned in the HPI.    Past Medical History:   Past Medical History:   Diagnosis Date   • Anxiety    • Hyperlipidemia    • Hypertension    • Hypothyroidism      Past Surgical History:History reviewed. No pertinent surgical history.  Social History:  reports that she has never smoked. She has never used smokeless tobacco. She reports that she does not drink alcohol and does not use drugs.    Family History: family history includes No Known Problems in her father and mother.      Allergies:  No Known Allergies  Medications:  Prior to Admission medications    Medication Sig Start Date End Date Taking? Authorizing Provider   atomoxetine (STRATTERA) 40 MG capsule Take 40 mg by mouth Daily. 1/15/22  Yes Charo Alcaraz MD   atorvastatin (LIPITOR) 40 MG tablet Take 40 mg by mouth Daily. 1/12/22  Yes Charo Alcaraz MD   fluticasone (FLONASE) 50 MCG/ACT nasal spray 2 sprays. 9/22/21  Yes Charo Alcaraz MD   hydroCHLOROthiazide (HYDRODIURIL) 12.5 MG tablet Take 12.5 mg by mouth Daily. 12/19/21  Yes Charo Alcaraz MD   levothyroxine (SYNTHROID, LEVOTHROID) 50 MCG tablet Take 50 mcg by mouth Daily. 12/19/21  Yes Charo Alcaraz MD   meloxicam (MOBIC) 15 MG tablet Take 15 mg by mouth  "Daily. 1/14/22  Yes ProviderCharo MD   metoprolol succinate XL (TOPROL-XL) 100 MG 24 hr tablet Take 100 mg by mouth Daily. 10/27/21  Yes Charo Alcaraz MD   Nortrel 7/7/7 0.5/0.75/1-35 MG-MCG per tablet  1/6/22  Yes Charo Alcaraz MD   pantoprazole (PROTONIX) 40 MG EC tablet Take 40 mg by mouth Daily. 10/27/21  Yes Charo Alcaraz MD   sertraline (ZOLOFT) 25 MG tablet Take 25 mg by mouth Daily. 12/19/21  Yes Charo Alcaraz MD   spironolactone (ALDACTONE) 25 MG tablet Take 25 mg by mouth Daily. 12/19/21  Yes Charo Alcaraz MD       Objective     Vital Signs: /86 (BP Location: Left arm, Patient Position: Sitting, Cuff Size: Adult)   Pulse 88   Temp 97 °F (36.1 °C) (Temporal)   Resp 20   Ht 162.6 cm (64\")   Wt 123 kg (272 lb)   SpO2 99%   Breastfeeding No   BMI 46.69 kg/m²   Physical Exam  Vitals reviewed.   Constitutional:       Appearance: She is well-developed.   HENT:      Head: Normocephalic and atraumatic.   Eyes:      Pupils: Pupils are equal, round, and reactive to light.   Neck:      Vascular: No JVD.   Cardiovascular:      Rate and Rhythm: Normal rate and regular rhythm.   Pulmonary:      Effort: Pulmonary effort is normal.   Abdominal:      General: Bowel sounds are normal.      Palpations: Abdomen is soft.   Musculoskeletal:         General: No deformity.      Cervical back: Normal range of motion and neck supple.      Comments: She has point tenderness at the right elbow with tenderness even at the right wrist.  Good capillary refill.  She does complain of decreased sensation throughout the forearm.   Lymphadenopathy:      Cervical: No cervical adenopathy.   Skin:     General: Skin is warm and dry.   Neurological:      Mental Status: She is alert and oriented to person, place, and time.   Psychiatric:         Behavior: Behavior normal.         Thought Content: Thought content normal.         Judgment: Judgment normal.       Results Reviewed:  Glucose "   Date Value Ref Range Status   03/13/2020 82 74 - 109 mg/dL Final     BUN   Date Value Ref Range Status   03/13/2020 16 6 - 20 mg/dL Final     Creatinine   Date Value Ref Range Status   03/13/2020 0.7 0.5 - 0.9 mg/dL Final     Sodium   Date Value Ref Range Status   03/13/2020 139 136 - 145 mmol/L Final     Potassium   Date Value Ref Range Status   03/13/2020 4.1 3.5 - 5.0 mmol/L Final     Chloride   Date Value Ref Range Status   03/13/2020 100 98 - 111 mmol/L Final     CO2   Date Value Ref Range Status   03/13/2020 23 22 - 29 mmol/L Final     Calcium   Date Value Ref Range Status   03/13/2020 9.8 8.6 - 10.0 mg/dL Final     ALT (SGPT)   Date Value Ref Range Status   03/13/2020 15 5 - 33 U/L Final     AST (SGOT)   Date Value Ref Range Status   03/13/2020 15 5 - 32 U/L Final     WBC   Date Value Ref Range Status   03/13/2020 10.2 4.8 - 10.8 K/uL Final     Hematocrit   Date Value Ref Range Status   03/13/2020 39.3 37.0 - 47.0 % Final     Platelets   Date Value Ref Range Status   03/13/2020 266 130 - 400 K/uL Final     Triglycerides   Date Value Ref Range Status   07/26/2018 113 0 - 149 mg/dL Final     HDL Cholesterol   Date Value Ref Range Status   07/26/2018 66 65 - 121 mg/dL Final     Comment:     VALUES>60 MG/DL ARE ASSOCIATED WITH A DECREASED RISK OF  ATHEROSCLEROTIC CARDIOVASCULAR DISEASE     LDL Cholesterol    Date Value Ref Range Status   07/26/2018 66 <100 mg/dL Final     Comment:     <100 MG/DL=OPITIMAL    100-129 MG/DL=DESIRABLE    130-159 MG/DL BORDERLINE=INCREASED RISK OF ATHEROSCLEROTIC  CARDIOVASCULAR DISEASE    > OR = 160 MG/DL=ASSOCIATED WITH AN INCREASE RISK OF  ATHEROSCLEROTIC CARDIOVASCULAR DISEASE         Assessment / Plan     Assessment/Plan:  Diagnoses and all orders for this visit:    1. Right elbow pain (Primary)  -     XR Elbow 2 View Right (In Office)    X-ray is interpreted as no acute abnormality.  I have secured her an appointment with orthopedics at 8 AM in the morning for further  evaluation.    No follow-ups on file. unless patient needs to be seen sooner or acute issues arise.    Code Status: full.     I have discussed the patient results/orders and and plan/recommendation with them at today's visit.      Tanya Guthrie, APRN   04/19/2022

## 2022-05-09 DIAGNOSIS — I10 ESSENTIAL HYPERTENSION: ICD-10-CM

## 2022-05-09 DIAGNOSIS — R10.13 MIDEPIGASTRIC PAIN: ICD-10-CM

## 2022-05-09 RX ORDER — PANTOPRAZOLE SODIUM 40 MG/1
40 TABLET, DELAYED RELEASE ORAL DAILY
Qty: 90 TABLET | Refills: 3 | Status: SHIPPED | OUTPATIENT
Start: 2022-05-09

## 2022-05-09 RX ORDER — METOPROLOL SUCCINATE 100 MG/1
100 TABLET, EXTENDED RELEASE ORAL DAILY
Qty: 90 TABLET | Refills: 3 | Status: SHIPPED | OUTPATIENT
Start: 2022-05-09

## 2022-05-09 NOTE — TELEPHONE ENCOUNTER
Received fax from pharmacy requesting refill on pts medication(s). Pt was last seen in office on 9/22/2021  and has a follow up scheduled for Visit date not found. Will send request to  [facility]  for authorization.      Requested Prescriptions     Pending Prescriptions Disp Refills    metoprolol succinate (TOPROL XL) 100 MG extended release tablet [Pharmacy Med Name: METOPROLOL SUCC  MG TAB] 90 tablet 3     Sig: Take 1 tablet by mouth daily    pantoprazole (PROTONIX) 40 MG tablet [Pharmacy Med Name: PANTOPRAZOLE SOD DR 40 MG TAB] 90 tablet 3     Sig: Take 1 tablet by mouth daily TAKE 1 TABLET BY MOUTH EVERY DAY

## 2022-06-20 ENCOUNTER — TELEMEDICINE (OUTPATIENT)
Dept: FAMILY MEDICINE CLINIC | Facility: TELEHEALTH | Age: 27
End: 2022-06-20

## 2022-06-20 VITALS — TEMPERATURE: 99.1 F

## 2022-06-20 DIAGNOSIS — U07.1 COVID-19: Primary | ICD-10-CM

## 2022-06-20 DIAGNOSIS — H92.03 OTALGIA OF BOTH EARS: ICD-10-CM

## 2022-06-20 DIAGNOSIS — R11.0 NAUSEA: ICD-10-CM

## 2022-06-20 PROCEDURE — 99213 OFFICE O/P EST LOW 20 MIN: CPT | Performed by: NURSE PRACTITIONER

## 2022-06-20 RX ORDER — ONDANSETRON 4 MG/1
4 TABLET, FILM COATED ORAL EVERY 8 HOURS PRN
Qty: 12 TABLET | Refills: 0 | Status: SHIPPED | OUTPATIENT
Start: 2022-06-20 | End: 2022-06-24

## 2022-06-21 DIAGNOSIS — Z30.41 ENCOUNTER FOR SURVEILLANCE OF CONTRACEPTIVE PILLS: ICD-10-CM

## 2022-06-21 RX ORDER — NORETHINDRONE AND ETHINYL ESTRADIOL 7 DAYS X 3
KIT ORAL
Qty: 84 TABLET | Refills: 3 | Status: SHIPPED | OUTPATIENT
Start: 2022-06-21

## 2022-06-21 NOTE — PATIENT INSTRUCTIONS
Upper Respiratory Infection, Adult  An upper respiratory infection (URI) is a common viral infection of the nose, throat, and upper air passages that lead to the lungs. The most common type of URI is the common cold. URIs usually get better on their own, without medical treatment.  What are the causes?  A URI is caused by a virus. You may catch a virus by:  Breathing in droplets from an infected person's cough or sneeze.  Touching something that has been exposed to the virus (contaminated) and then touching your mouth, nose, or eyes.  What increases the risk?  You are more likely to get a URI if:  You are very young or very old.  It is doni or winter.  You have close contact with others, such as at a , school, or health care facility.  You smoke.  You have long-term (chronic) heart or lung disease.  You have a weakened disease-fighting (immune) system.  You have nasal allergies or asthma.  You are experiencing a lot of stress.  You work in an area that has poor air circulation.  You have poor nutrition.  What are the signs or symptoms?  A URI usually involves some of the following symptoms:  Runny or stuffy (congested) nose.  Sneezing.  Cough.  Sore throat.  Headache.  Fatigue.  Fever.  Loss of appetite.  Pain in your forehead, behind your eyes, and over your cheekbones (sinus pain).  Muscle aches.  Redness or irritation of the eyes.  Pressure in the ears or face.  How is this diagnosed?  This condition may be diagnosed based on your medical history and symptoms, and a physical exam. Your health care provider may use a cotton swab to take a mucus sample from your nose (nasal swab). This sample can be tested to determine what virus is causing the illness.  How is this treated?  URIs usually get better on their own within 7-10 days. You can take steps at home to relieve your symptoms. Medicines cannot cure URIs, but your health care provider may recommend certain medicines to help relieve symptoms, such  as:  Over-the-counter cold medicines.  Cough suppressants. Coughing is a type of defense against infection that helps to clear the respiratory system, so take these medicines only as recommended by your health care provider.  Fever-reducing medicines.  Follow these instructions at home:  Activity  Rest as needed.  If you have a fever, stay home from work or school until your fever is gone or until your health care provider says you are no longer contagious. Your health care provider may have you wear a face mask to prevent your infection from spreading.  Relieving symptoms  Gargle with a salt-water mixture 3-4 times a day or as needed. To make a salt-water mixture, completely dissolve ½-1 tsp of salt in 1 cup of warm water.  Use a cool-mist humidifier to add moisture to the air. This can help you breathe more easily.  Eating and drinking    Drink enough fluid to keep your urine pale yellow.  Eat soups and other clear broths.    General instructions    Take over-the-counter and prescription medicines only as told by your health care provider. These include cold medicines, fever reducers, and cough suppressants.  Do not use any products that contain nicotine or tobacco, such as cigarettes and e-cigarettes. If you need help quitting, ask your health care provider.  Stay away from secondhand smoke.  Stay up to date on all immunizations, including the yearly (annual) flu vaccine.  Keep all follow-up visits as told by your health care provider. This is important.    How to prevent the spread of infection to others    URIs can be passed from person to person (are contagious). To prevent the infection from spreading:  Wash your hands often with soap and water. If soap and water are not available, use hand .  Avoid touching your mouth, face, eyes, or nose.  Cough or sneeze into a tissue or your sleeve or elbow instead of into your hand or into the air.    Contact a health care provider if:  You are getting worse  instead of better.  You have a fever or chills.  Your mucus is brown or red.  You have yellow or brown discharge coming from your nose.  You have pain in your face, especially when you bend forward.  You have swollen neck glands.  You have pain while swallowing.  You have white areas in the back of your throat.  Get help right away if:  You have shortness of breath that gets worse.  You have severe or persistent:  Headache.  Ear pain.  Sinus pain.  Chest pain.  You have chronic lung disease along with any of the following:  Wheezing.  Prolonged cough.  Coughing up blood.  A change in your usual mucus.  You have a stiff neck.  You have changes in your:  Vision.  Hearing.  Thinking.  Mood.  Summary  An upper respiratory infection (URI) is a common infection of the nose, throat, and upper air passages that lead to the lungs.  A URI is caused by a virus.  URIs usually get better on their own within 7-10 days.  Medicines cannot cure URIs, but your health care provider may recommend certain medicines to help relieve symptoms.  This information is not intended to replace advice given to you by your health care provider. Make sure you discuss any questions you have with your health care provider.  Document Revised: 08/26/2021 Document Reviewed: 08/26/2021  [employer] Patient Education © 2021 [employer]  How to Quarantine at Home  Information for Patients and Families    These instructions are for people with confirmed or suspected COVID-19 who do not need to be hospitalized and those with confirmed COVID-19 who were hospitalized and discharged to care for themselves at home.    If you were tested through the Health Department  The Health Department will monitor your wellbeing.  If it is determined that you do not need to be hospitalized and can be isolated at home, you will be monitored by staff from your local or state health department.     If you were tested through a Commercial Lab  You will need to monitor yourself  and report changes in your symptoms to your doctor.  See the section below called Monitor Your Symptoms.    Follow these steps until a healthcare provider or local or state health department says you can return to your normal activities.    Stay home except to get medical care  Restrict activities outside your home, except for getting medical care.   Do not go to work, school, or public areas.   Avoid using public transportation, ride-sharing, or taxis.    Separate yourself from other people and animals in your home  People  As much as possible, you should stay in a specific room and away from other people in your home. Also, you should use a separate bathroom, if available.    Animals  You should restrict contact with pets and other animals while you are sick with COVID-19, just like you would around other people. When possible, have another member of your household care for your animals while you are sick. If you are sick with COVID-19, avoid contact with your pet, including petting, snuggling, being kissed or licked, and sharing food. If you must care for your pet or be around animals while you are sick, wash your hands before and after you interact with pets and wear a facemask. See COVID-19 and Animals for more information.    Call ahead before visiting your doctor  If you have a medical appointment, call the healthcare provider and tell them that you have or may have COVID-19. This information will help the healthcare provider’s office take steps to keep other people from getting infected or exposed.    Wear a facemask  You should wear a facemask when you are around other people (e.g., sharing a room or vehicle) or pets and before you enter a healthcare provider’s office.     If you are not able to wear a facemask (for example, because it causes trouble breathing), then people who live with you should not stay in the same room with you, or they should wear a facemask if they enter your room.    Cover your  coughs and sneezes  Cover your mouth and nose with a tissue when you cough or sneeze.   Throw used tissues in a lined trash can.   Immediately wash your hands with soap and water for at least 20 seconds or, if soap and water are not available, clean your hands with an alcohol-based hand  that contains at least 60% alcohol.    Clean your hands often  Wash your hands often with soap and water for at least 20 seconds, especially after blowing your nose, coughing, or sneezing; going to the bathroom; and before eating or preparing food.     If soap and water are not readily available, use an alcohol-based hand  with at least 60% alcohol, covering all surfaces of your hands and rubbing them together until they feel dry.    Soap and water are the best option if hands are visibly dirty. Avoid touching your eyes, nose, and mouth with unwashed hands.    Avoid sharing personal household items  You should not share dishes, drinking glasses, cups, eating utensils, towels, or bedding with other people or pets in your home.   After using these items, they should be washed thoroughly with soap and water.    Clean all “high-touch” surfaces everyday  High touch surfaces include counters, tabletops, doorknobs, bathroom fixtures, toilets, phones, keyboards, tablets, and bedside tables.   Also, clean any surfaces that may have blood, stool, or body fluids on them.   Use a household cleaning spray or wipe, according to the label instructions. Labels contain instructions for safe and effective use of the cleaning product, including precautions you should take when applying the product, such as wearing gloves and making sure you have good ventilation during use of the product.    Monitor your symptoms  Seek prompt medical attention if your illness is worsening (e.g., difficulty breathing).   Before seeking care, call your healthcare provider and tell them that you have, or are being evaluated for, COVID-19.   Put on a  facemask before you enter the facility.     These steps will help the healthcare provider’s office to keep other people in the office or waiting room from getting infected or exposed.   Persons who are placed under active monitoring or facilitated self-monitoring should follow instructions provided by their local health department or occupational health professionals, as appropriate.  If you have a medical emergency and need to call 911, notify the dispatch personnel that you have, or are being evaluated for COVID-19. If possible, put on a facemask before emergency medical services arrive.    Discontinuing home isolation  Patients with confirmed COVID-19 should remain under home isolation precautions until the risk of secondary transmission to others is thought to be low. The decision to discontinue home isolation precautions should be made on a case-by-case basis, in consultation with healthcare providers and state and local health departments.    The below content are for household members, intimate partners, and caregivers of a patient with symptomatic laboratory-confirmed COVID-19 or a patient under investigation:    Household members, intimate partners, and caregivers may have close contact with a person with symptomatic, laboratory-confirmed COVID-19 or a person under investigation.     Close contacts should monitor their health; they should call their healthcare provider right away if they develop symptoms suggestive of COVID-19 (e.g., fever, cough, shortness of breath)     Close contacts should also follow these recommendations:  Make sure that you understand and can help the patient follow their healthcare provider’s instructions for medication(s) and care. You should help the patient with basic needs in the home and provide support for getting groceries, prescriptions, and other personal needs.  Monitor the patient’s symptoms. If the patient is getting sicker, call his or her healthcare provider and tell  them that the patient has laboratory-confirmed COVID-19. This will help the healthcare provider’s office take steps to keep other people in the office or waiting room from getting infected. Ask the healthcare provider to call the local or state health department for additional guidance. If the patient has a medical emergency and you need to call 911, notify the dispatch personnel that the patient has, or is being evaluated for COVID-19.  Household members should stay in another room or be  from the patient as much as possible. Household members should use a separate bedroom and bathroom, if available.  Prohibit visitors who do not have an essential need to be in the home.  Household members should care for any pets in the home. Do not handle pets or other animals while sick.  For more information, see COVID-19 and Animals.  Make sure that shared spaces in the home have good air flow, such as by an air conditioner or an opened window, weather permitting.  Perform hand hygiene frequently. Wash your hands often with soap and water for at least 20 seconds or use an alcohol-based hand  that contains 60 to 95% alcohol, covering all surfaces of your hands and rubbing them together until they feel dry. Soap and water should be used preferentially if hands are visibly dirty.  Avoid touching your eyes, nose, and mouth with unwashed hands.  The patient should wear a facemask when you are around other people. If the patient is not able to wear a facemask (for example, because it causes trouble breathing), you, as the caregiver, should wear a mask when you are in the same room as the patient.  Wear a disposable facemask and gloves when you touch or have contact with the patient’s blood, stool, or body fluids, such as saliva, sputum, nasal mucus, vomit, or urine.   Throw out disposable facemasks and gloves after using them. Do not reuse.  When removing personal protective equipment, first remove and dispose of  gloves. Then, immediately clean your hands with soap and water or alcohol-based hand . Next, remove and dispose of facemask, and immediately clean your hands again with soap and water or alcohol-based hand .  Avoid sharing household items with the patient. You should not share dishes, drinking glasses, cups, eating utensils, towels, bedding, or other items. After the patient uses these items, you should wash them thoroughly (see below “Wash laundry thoroughly”).  Clean all “high-touch” surfaces, such as counters, tabletops, doorknobs, bathroom fixtures, toilets, phones, keyboards, tablets, and bedside tables, every day. Also, clean any surfaces that may have blood, stool, or body fluids on them.   Use a household cleaning spray or wipe, according to the label instructions. Labels contain instructions for safe and effective use of the cleaning product including precautions you should take when applying the product, such as wearing gloves and making sure you have good ventilation during use of the product.  Wash laundry thoroughly.   Immediately remove and wash clothes or bedding that have blood, stool, or body fluids on them.  Wear disposable gloves while handling soiled items and keep soiled items away from your body. Clean your hands (with soap and water or an alcohol-based hand ) immediately after removing your gloves.  Read and follow directions on labels of laundry or clothing items and detergent. In general, using a normal laundry detergent according to washing machine instructions and dry thoroughly using the warmest temperatures recommended on the clothing label.  Place all used disposable gloves, facemasks, and other contaminated items in a lined container before disposing of them with other household waste. Clean your hands (with soap and water or an alcohol-based hand ) immediately after handling these items. Soap and water should be used preferentially if hands are  visibly dirty.  Discuss any additional questions with your state or local health department or healthcare provider.    Adapted from information provided by the Centers for Disease Control and Prevention.  For more information, visit https://www.cdc.gov/coronavirus/2019-ncov/hcp/guidance-prevent-spread.html

## 2022-06-21 NOTE — TELEPHONE ENCOUNTER
Received fax from pharmacy requesting refill on pts medication(s). Pt was last seen in office on 9/22/2021  and has a follow up scheduled for Visit date not found. Will send request to  [facility]  for patient.      Requested Prescriptions     Pending Prescriptions Disp Refills    NORTREL 7/7/7 0.5/0.75/1-35 MG-MCG per tablet [Pharmacy Med Name: Linell Silver 7-7-7-28 TABLET] 84 tablet 3     Sig: TAKE 1 TABLET BY MOUTH EVERY DAY

## 2022-06-21 NOTE — PROGRESS NOTES
You have chosen to receive care through a telehealth visit.  Do you consent to use a video/audio connection for your medical care today? Yes     CHIEF COMPLAINT  Chief Complaint   Patient presents with   • Exposure To Known Illness         HPI  Dayana Leavitt is a 27 y.o. female  presents with complaint of ear pain and sore throat. Reports both ears are hurting and feel full. Symptoms started today. Low grade fever. No chills. + diarrhea and nausea. No vomiting. No SOA or CP. Reports decreased appetite. Reports she took an at home COVID test that was + this evening. Reports she has not taken any medications for her symptoms. Reports she is vaccinated for COVID 19.     Review of Systems   Constitutional: Positive for appetite change, fatigue and fever. Negative for chills.        Low grade fever   HENT: Positive for ear pain, rhinorrhea, sinus pressure, sinus pain and sore throat. Negative for congestion and ear discharge.    Respiratory: Negative for cough, chest tightness, shortness of breath and wheezing.    Cardiovascular: Negative for chest pain.   Gastrointestinal: Positive for diarrhea and nausea. Negative for abdominal pain and vomiting.   Musculoskeletal: Negative for back pain and myalgias.   Neurological: Negative for dizziness and headaches.   Psychiatric/Behavioral: Negative.        Past Medical History:   Diagnosis Date   • Anxiety    • Hyperlipidemia    • Hypertension    • Hypothyroidism        Family History   Problem Relation Age of Onset   • No Known Problems Mother    • No Known Problems Father        Social History     Socioeconomic History   • Marital status: Single   Tobacco Use   • Smoking status: Never Smoker   • Smokeless tobacco: Never Used   Substance and Sexual Activity   • Alcohol use: Never   • Drug use: Never   • Sexual activity: Defer       Dayana Leavitt  reports that she has never smoked. She has never used smokeless tobacco.. I have educated her on the risk of diseases from using tobacco  products such as cancer, COPD and heart disease.         I spent 2 minutes counseling the patient.              Temp 99.1 °F (37.3 °C) (Oral)   LMP 06/13/2022   Breastfeeding No     PHYSICAL EXAM  Physical Exam   Constitutional: She is oriented to person, place, and time. She appears well-developed and well-nourished. No distress.   HENT:   Head: Normocephalic and atraumatic.   Right Ear: Hearing normal. No drainage.   Left Ear: Hearing normal. No drainage.   Nose: Rhinorrhea present. Right sinus exhibits no maxillary sinus tenderness. Left sinus exhibits no maxillary sinus tenderness.   Mouth/Throat: Mouth/Lips are normal.Oropharynx is clear and moist.   Patient directed exam   Eyes: Conjunctivae and lids are normal.   Pulmonary/Chest: Effort normal.  No respiratory distress.  Lymphadenopathy:        Right cervical: No anterior cervical adenopathy present.       Left cervical: No anterior cervical adenopathy present.   Neurological: She is alert and oriented to person, place, and time. She has normal strength.   Skin: No rash noted.   Psychiatric: She has a normal mood and affect. Her speech is normal and behavior is normal.       No results found for this or any previous visit.    Diagnoses and all orders for this visit:    1. COVID-19 (Primary)    Other orders  -     ondansetron (Zofran) 4 MG tablet; Take 1 tablet by mouth Every 8 (Eight) Hours As Needed for Nausea or Vomiting for up to 4 days.  Dispense: 12 tablet; Refill: 0    Rest  Fluids  Quarantine   Tylenol or IBU for pain or body aches  PCP if symptoms persist  ER for worsening symptoms such as high fever, chest pain or shortness of air. Encouraged patient to get an O2 sat monitor and if less than 93% go to ER.         FOLLOW-UP  As discussed during visit with PCP/[facility] if no improvement or Urgent Care/Emergency Department if worsening of symptoms    Patient verbalizes understanding of medication dosage, comfort measures, instructions for  treatment and follow-up.    Lesley Blank Armando, APRN  06/20/2022  23:27 EDT    The use of a video visit has been reviewed with the patient and verbal informed consent has been obtained. Myself and Dayana Leavitt participated in this visit. The patient is located in [address] [place] [address].    I am located in [place], [place]. Mychart and Zoom were utilized. I spent 5 minutes in the patient's chart for this visit.

## 2022-06-27 DIAGNOSIS — E03.9 ACQUIRED HYPOTHYROIDISM: ICD-10-CM

## 2022-06-27 DIAGNOSIS — M79.672 FOOT PAIN, LEFT: ICD-10-CM

## 2022-06-27 DIAGNOSIS — F98.8 ATTENTION DEFICIT DISORDER (ADD) WITHOUT HYPERACTIVITY: ICD-10-CM

## 2022-06-27 DIAGNOSIS — L68.0 HIRSUTISM: ICD-10-CM

## 2022-06-27 RX ORDER — MELOXICAM 15 MG/1
15 TABLET ORAL DAILY
Qty: 90 TABLET | Refills: 3 | Status: SHIPPED | OUTPATIENT
Start: 2022-06-27

## 2022-06-27 RX ORDER — ATOMOXETINE 40 MG/1
40 CAPSULE ORAL DAILY
Qty: 90 CAPSULE | Refills: 3 | Status: SHIPPED | OUTPATIENT
Start: 2022-06-27

## 2022-06-27 RX ORDER — LEVOTHYROXINE SODIUM 0.05 MG/1
50 TABLET ORAL DAILY
Qty: 90 TABLET | Refills: 3 | Status: SHIPPED | OUTPATIENT
Start: 2022-06-27

## 2022-06-27 RX ORDER — SPIRONOLACTONE 25 MG/1
25 TABLET ORAL 2 TIMES DAILY
Qty: 180 TABLET | Refills: 3 | Status: SHIPPED | OUTPATIENT
Start: 2022-06-27

## 2022-06-27 NOTE — TELEPHONE ENCOUNTER
Received fax from pharmacy requesting refill on pts medication(s). Pt was last seen in office on 9/22/2021  and has a follow up scheduled for Visit date not found. Will send request to  [facility]  for authorization.      Requested Prescriptions     Pending Prescriptions Disp Refills    spironolactone (ALDACTONE) 25 MG tablet [Pharmacy Med Name: SPIRONOLACTONE 25 MG TABLET] 180 tablet 3     Sig: Take 1 tablet by mouth 2 times daily    meloxicam (MOBIC) 15 MG tablet [Pharmacy Med Name: MELOXICAM 15 MG TABLET] 90 tablet 3     Sig: Take 1 tablet by mouth daily    atomoxetine (STRATTERA) 40 MG capsule [Pharmacy Med Name: ATOMOXETINE HCL 40 MG CAPSULE] 90 capsule 3     Sig: Take 1 capsule by mouth daily    levothyroxine (SYNTHROID) 50 MCG tablet [Pharmacy Med Name: LEVOTHYROXINE 50 MCG TABLET] 90 tablet 3     Sig: Take 1 tablet by mouth Daily

## 2022-07-02 DIAGNOSIS — F40.10 SOCIAL ANXIETY DISORDER: ICD-10-CM

## 2022-07-03 DIAGNOSIS — I10 ESSENTIAL HYPERTENSION: ICD-10-CM

## 2022-07-05 RX ORDER — SERTRALINE HYDROCHLORIDE 25 MG/1
25 TABLET, FILM COATED ORAL DAILY
Qty: 30 TABLET | Refills: 0 | Status: SHIPPED | OUTPATIENT
Start: 2022-07-05

## 2022-07-05 RX ORDER — HYDROCHLOROTHIAZIDE 12.5 MG/1
TABLET ORAL
Qty: 90 TABLET | Refills: 3 | Status: SHIPPED | OUTPATIENT
Start: 2022-07-05

## 2022-07-10 DIAGNOSIS — E03.9 ACQUIRED HYPOTHYROIDISM: ICD-10-CM

## 2022-07-10 DIAGNOSIS — E78.2 MIXED HYPERLIPIDEMIA: ICD-10-CM

## 2022-07-10 DIAGNOSIS — I10 ESSENTIAL HYPERTENSION: Primary | ICD-10-CM

## 2022-07-11 RX ORDER — ATORVASTATIN CALCIUM 40 MG/1
TABLET, FILM COATED ORAL
Qty: 30 TABLET | Refills: 0 | Status: SHIPPED | OUTPATIENT
Start: 2022-07-11

## 2022-07-11 NOTE — TELEPHONE ENCOUNTER
Received fax from pharmacy requesting refill on pts medication(s). Pt was last seen in office on 9/22/2021  and has a follow up scheduled for Visit date not found. Will send request to  [facility]  for patient.      Requested Prescriptions     Pending Prescriptions Disp Refills    atorvastatin (LIPITOR) 40 MG tablet [Pharmacy Med Name: ATORVASTATIN 40 MG TABLET] 90 tablet 0     Sig: TAKE 1 TABLET BY MOUTH EVERY DAY AT NIGHT

## 2022-09-30 ENCOUNTER — OFFICE VISIT (OUTPATIENT)
Dept: INTERNAL MEDICINE | Facility: CLINIC | Age: 27
End: 2022-09-30

## 2022-09-30 VITALS
BODY MASS INDEX: 46.26 KG/M2 | TEMPERATURE: 95.5 F | SYSTOLIC BLOOD PRESSURE: 140 MMHG | OXYGEN SATURATION: 99 % | HEART RATE: 93 BPM | HEIGHT: 64 IN | WEIGHT: 271 LBS | DIASTOLIC BLOOD PRESSURE: 87 MMHG

## 2022-09-30 DIAGNOSIS — H66.003 ACUTE SUPPURATIVE OTITIS MEDIA OF BOTH EARS WITHOUT SPONTANEOUS RUPTURE OF TYMPANIC MEMBRANES, RECURRENCE NOT SPECIFIED: ICD-10-CM

## 2022-09-30 DIAGNOSIS — J01.00 ACUTE NON-RECURRENT MAXILLARY SINUSITIS: Primary | ICD-10-CM

## 2022-09-30 LAB
EXPIRATION DATE: NORMAL
INTERNAL CONTROL: NORMAL
Lab: NORMAL
SARS-COV-2 AG UPPER RESP QL IA.RAPID: NOT DETECTED

## 2022-09-30 PROCEDURE — 87426 SARSCOV CORONAVIRUS AG IA: CPT | Performed by: NURSE PRACTITIONER

## 2022-09-30 PROCEDURE — 99213 OFFICE O/P EST LOW 20 MIN: CPT | Performed by: NURSE PRACTITIONER

## 2022-09-30 RX ORDER — CEFDINIR 300 MG/1
300 CAPSULE ORAL 2 TIMES DAILY
Qty: 20 CAPSULE | Refills: 0 | Status: SHIPPED | OUTPATIENT
Start: 2022-09-30 | End: 2023-01-05

## 2022-09-30 RX ORDER — ATORVASTATIN CALCIUM 40 MG/1
1 TABLET, FILM COATED ORAL
COMMUNITY
Start: 2022-07-11 | End: 2022-09-30 | Stop reason: SDUPTHER

## 2022-09-30 RX ORDER — METHYLPREDNISOLONE 4 MG/1
TABLET ORAL
Qty: 21 TABLET | Refills: 0 | Status: SHIPPED | OUTPATIENT
Start: 2022-09-30 | End: 2023-01-05

## 2023-01-05 ENCOUNTER — OFFICE VISIT (OUTPATIENT)
Dept: INTERNAL MEDICINE | Facility: CLINIC | Age: 28
End: 2023-01-05
Payer: COMMERCIAL

## 2023-01-05 VITALS
HEART RATE: 102 BPM | HEIGHT: 64 IN | RESPIRATION RATE: 18 BRPM | OXYGEN SATURATION: 100 % | TEMPERATURE: 98.1 F | BODY MASS INDEX: 46.44 KG/M2 | SYSTOLIC BLOOD PRESSURE: 143 MMHG | WEIGHT: 272 LBS | DIASTOLIC BLOOD PRESSURE: 89 MMHG

## 2023-01-05 DIAGNOSIS — E66.01 MORBID (SEVERE) OBESITY DUE TO EXCESS CALORIES: ICD-10-CM

## 2023-01-05 DIAGNOSIS — Z11.59 ENCOUNTER FOR HEPATITIS C SCREENING TEST FOR LOW RISK PATIENT: ICD-10-CM

## 2023-01-05 DIAGNOSIS — F33.1 MODERATE EPISODE OF RECURRENT MAJOR DEPRESSIVE DISORDER: ICD-10-CM

## 2023-01-05 DIAGNOSIS — Z00.00 ROUTINE GENERAL MEDICAL EXAMINATION AT A HEALTH CARE FACILITY: Primary | ICD-10-CM

## 2023-01-05 DIAGNOSIS — E55.9 VITAMIN D DEFICIENCY: ICD-10-CM

## 2023-01-05 PROCEDURE — 99395 PREV VISIT EST AGE 18-39: CPT | Performed by: NURSE PRACTITIONER

## 2023-01-05 RX ORDER — METFORMIN HYDROCHLORIDE 500 MG/1
500 TABLET, EXTENDED RELEASE ORAL
Qty: 30 TABLET | Refills: 1 | Status: SHIPPED | OUTPATIENT
Start: 2023-01-05 | End: 2023-02-07 | Stop reason: SDUPTHER

## 2023-01-05 NOTE — PROGRESS NOTES
Subjective     Chief Complaint   Patient presents with   • Annual Exam     The patient presents today for a physical and to discuss weight reduction.    History of Present Illness     Overall Health  The patient states that she is feeling\" fluffy.\"  She denies any family history of breast cancer. The patient denies any chest pain or shortness of breath. She states that her activity level is still low.     Anxiety  She states that her anxiety has worsened. The patient states that she has been taking Zoloft 25 mg. She states that in the last 2 weeks, she feels down, depressed, and hopeless. The patient notes that in the past 2 weeks she has had these feelings twice. She states that she sleeps well. The patient states that she feels tired and has little energy. She states that she overeats. The patient states that she occasionally feels bad about herself or that she is a failure to let her family down. She states that she has trouble concentrating. The patient denies moving or speaking slowly, or being fidgety or restless. She denies that she would be better off dead or hurting herself. She denies having little interest or pleasure.    The patient states that she is not sexually active. She notes that she has never been sexually active. She states that she is taking birth control.      Over the last two weeks, how often have you been bothered by the following problems?  Feeling nervous, anxious or on edge: Nearly every day  Not being able to stop or control worrying: Nearly every day  Worrying too much about different things: Nearly every day  Trouble Relaxing: More than half the days  Being so restless that it is hard to sit still: Not at all  Becoming easily annoyed or irritable: Nearly every day  Feeling afraid as if something awful might happen: Several days  EL 7 Total Score: 15  If you checked any problems, how difficult have these problems made it for you to do your work, take care of things at home, or  get along with other people: Extremely difficult    Patient's PMR from outside medical facility reviewed and noted.    Review of Systems   Otherwise complete ROS reviewed and negative except as mentioned in the HPI.    Past Medical History:   Past Medical History:   Diagnosis Date   • ADHD (attention deficit hyperactivity disorder)    • Anxiety    • Hyperlipidemia    • Hypertension    • Hypothyroidism      Past Surgical History:History reviewed. No pertinent surgical history.  Social History:  reports that she has never smoked. She has never used smokeless tobacco. She reports that she does not drink alcohol and does not use drugs.    Family History: family history includes Depression in her mother; Diabetes in her mother; Heart disease in her maternal grandfather; Hyperlipidemia in her mother; No Known Problems in her father.      Allergies:  No Known Allergies  Medications:  Prior to Admission medications    Medication Sig Start Date End Date Taking? Authorizing Provider   atomoxetine (STRATTERA) 40 MG capsule Take 40 mg by mouth Daily. 1/15/22  Yes Charo Alcaraz MD   atorvastatin (LIPITOR) 40 MG tablet Take 40 mg by mouth Daily. 1/12/22  Yes Charo Alcaraz MD   fluticasone (FLONASE) 50 MCG/ACT nasal spray 2 sprays. 9/22/21  Yes Charo Alcaraz MD   hydroCHLOROthiazide (HYDRODIURIL) 12.5 MG tablet Take 12.5 mg by mouth Daily. 12/19/21  Yes Charo Alcaraz MD   levothyroxine (SYNTHROID, LEVOTHROID) 50 MCG tablet Take 50 mcg by mouth Daily. 12/19/21  Yes Charo Alcaraz MD   meloxicam (MOBIC) 15 MG tablet Take 15 mg by mouth Daily. 1/14/22  Yes Charo Alcaraz MD   metoprolol succinate XL (TOPROL-XL) 100 MG 24 hr tablet Take 100 mg by mouth Daily. 10/27/21  Yes Charo Alcaraz MD   Nortrel 7/7/7 0.5/0.75/1-35 MG-MCG per tablet  1/6/22  Yes Charo Alcaraz MD   pantoprazole (PROTONIX) 40 MG EC tablet Take 40 mg by mouth Daily. 10/27/21  Yes Charo Alcaraz MD    sertraline (ZOLOFT) 25 MG tablet Take 25 mg by mouth Daily. 12/19/21  Yes Provider, MD Charo   spironolactone (ALDACTONE) 25 MG tablet Take 25 mg by mouth Daily. 12/19/21  Yes Provider, MD Charo   traMADol (ULTRAM) 50 MG tablet Take 1 tablet by mouth Every 6 (Six) Hours As Needed for Moderate Pain . 4/19/22  Yes Tanya Guthrie APRN   cefdinir (OMNICEF) 300 MG capsule Take 1 capsule by mouth 2 (Two) Times a Day. 9/30/22   Tanya Guthrie APRN   methylPREDNISolone (MEDROL) 4 MG dose pack Take as directed on package instructions. 9/30/22   Tanya Guthrie APRN       Objective     Vital Signs: /89   Pulse 102   Temp 98.1 °F (36.7 °C)   Resp 18   Ht 162.6 cm (64\")   Wt 123 kg (272 lb)   SpO2 100%   BMI 46.69 kg/m²   Physical Exam  Vitals reviewed.   Constitutional:       Appearance: She is well-developed.   HENT:      Head: Normocephalic and atraumatic.   Eyes:      Pupils: Pupils are equal, round, and reactive to light.   Neck:      Vascular: No JVD.   Cardiovascular:      Rate and Rhythm: Normal rate and regular rhythm.   Pulmonary:      Effort: Pulmonary effort is normal.   Abdominal:      General: Bowel sounds are normal.      Palpations: Abdomen is soft.   Musculoskeletal:         General: No deformity.      Cervical back: Normal range of motion and neck supple.   Lymphadenopathy:      Cervical: No cervical adenopathy.   Skin:     General: Skin is warm and dry.   Neurological:      Mental Status: She is alert and oriented to person, place, and time.   Psychiatric:         Behavior: Behavior normal.         Thought Content: Thought content normal.         Judgment: Judgment normal.       Class 3 Severe Obesity (BMI >=40). Obesity-related health conditions include the following: dyslipidemias. Obesity is unchanged. BMI is is above average; BMI management plan is completed. We discussed low calorie, low carb based diet program, portion control, increasing exercise  and pharmacologic options including metformin.      Results Reviewed:  Glucose   Date Value Ref Range Status   03/13/2020 82 74 - 109 mg/dL Final     BUN   Date Value Ref Range Status   03/13/2020 16 6 - 20 mg/dL Final     Creatinine   Date Value Ref Range Status   03/13/2020 0.7 0.5 - 0.9 mg/dL Final     Sodium   Date Value Ref Range Status   03/13/2020 139 136 - 145 mmol/L Final     Potassium   Date Value Ref Range Status   03/13/2020 4.1 3.5 - 5.0 mmol/L Final     Chloride   Date Value Ref Range Status   03/13/2020 100 98 - 111 mmol/L Final     CO2   Date Value Ref Range Status   03/13/2020 23 22 - 29 mmol/L Final     Calcium   Date Value Ref Range Status   03/13/2020 9.8 8.6 - 10.0 mg/dL Final     ALT (SGPT)   Date Value Ref Range Status   03/13/2020 15 5 - 33 U/L Final     AST (SGOT)   Date Value Ref Range Status   03/13/2020 15 5 - 32 U/L Final     WBC   Date Value Ref Range Status   03/13/2020 10.2 4.8 - 10.8 K/uL Final     Hematocrit   Date Value Ref Range Status   03/13/2020 39.3 37.0 - 47.0 % Final     Platelets   Date Value Ref Range Status   03/13/2020 266 130 - 400 K/uL Final     Triglycerides   Date Value Ref Range Status   07/26/2018 113 0 - 149 mg/dL Final     HDL Cholesterol   Date Value Ref Range Status   07/26/2018 66 65 - 121 mg/dL Final     Comment:     VALUES>60 MG/DL ARE ASSOCIATED WITH A DECREASED RISK OF  ATHEROSCLEROTIC CARDIOVASCULAR DISEASE     LDL Cholesterol    Date Value Ref Range Status   07/26/2018 66 <100 mg/dL Final     Comment:     <100 MG/DL=OPITIMAL    100-129 MG/DL=DESIRABLE    130-159 MG/DL BORDERLINE=INCREASED RISK OF ATHEROSCLEROTIC  CARDIOVASCULAR DISEASE    > OR = 160 MG/DL=ASSOCIATED WITH AN INCREASE RISK OF  ATHEROSCLEROTIC CARDIOVASCULAR DISEASE         Assessment / Plan     Assessment/Plan:    1. Annual physical exam  - We will check her thyroid, vitamin D level, cholesterol, and hepatitis C antibody.  - We will also check her regular chemistry panel.    2. Weight  loss  - We will start the patient on metformin  mg once daily. If she does not tolerate it or if she does not lose weight with it, then we will give her Saxenda.  - We advised the patient to focus on decreasing her carbohydrates and not to drink her calories.  - We advised the patient to cut out carbohydrates and junk foods.    3. Anxiety  - We will increase her Zoloft.    4. Obstetrics  - Advised to become established with an obstetrician in the future.     Will follow up in one month in the office or on a video visit.      Diagnoses and all orders for this visit:    1. Routine general medical examination at a health care facility (Primary)  -     CBC & Differential  -     Comprehensive Metabolic Panel  -     Hemoglobin A1c  -     Lipid Panel  -     TSH  -     T4, free    2. Vitamin D deficiency  -     Vitamin D,25-Hydroxy    3. Encounter for hepatitis C screening test for low risk patient  -     Hepatitis C Antibody    4. Moderate episode of recurrent major depressive disorder (HCC)  -     sertraline (ZOLOFT) 50 MG tablet; Take 1 tablet by mouth Daily.  Dispense: 90 tablet; Refill: 1    5. Morbid (severe) obesity due to excess calories (HCC)  -     metFORMIN ER (Glucophage XR) 500 MG 24 hr tablet; Take 1 tablet by mouth Daily With Breakfast.  Dispense: 30 tablet; Refill: 1      Return in about 1 month (around 2/5/2023). unless patient needs to be seen sooner or acute issues arise.    Code Status: Full.     I have discussed the patient results/orders and and plan/recommendation with them at today's visit.      TIAGO Clarke   01/05/2023    Transcribed from ambient dictation for TIAGO Clarke by Tanya Herr.  01/05/23   12:02 CST    Patient or patient representative verbalized consent to the visit recording.  I have personally performed the services described in this document as transcribed by the above individual, and it is both accurate and complete.  Tanya Jacob  TIAGO Guthrie  1/5/2023  18:35 CST

## 2023-01-06 LAB
25(OH)D3+25(OH)D2 SERPL-MCNC: 33 NG/ML (ref 30–100)
ALBUMIN SERPL-MCNC: 4.3 G/DL (ref 3.9–5)
ALBUMIN/GLOB SERPL: 1.9 {RATIO} (ref 1.2–2.2)
ALP SERPL-CCNC: 60 IU/L (ref 44–121)
ALT SERPL-CCNC: 19 IU/L (ref 0–32)
AST SERPL-CCNC: 14 IU/L (ref 0–40)
BASOPHILS # BLD AUTO: 0.1 X10E3/UL (ref 0–0.2)
BASOPHILS NFR BLD AUTO: 1 %
BILIRUB SERPL-MCNC: 0.3 MG/DL (ref 0–1.2)
BUN SERPL-MCNC: 16 MG/DL (ref 6–20)
BUN/CREAT SERPL: 20 (ref 9–23)
CALCIUM SERPL-MCNC: 9.2 MG/DL (ref 8.7–10.2)
CHLORIDE SERPL-SCNC: 100 MMOL/L (ref 96–106)
CHOLEST SERPL-MCNC: 170 MG/DL (ref 100–199)
CO2 SERPL-SCNC: 22 MMOL/L (ref 20–29)
CREAT SERPL-MCNC: 0.81 MG/DL (ref 0.57–1)
EGFRCR SERPLBLD CKD-EPI 2021: 101 ML/MIN/1.73
EOSINOPHIL # BLD AUTO: 0.3 X10E3/UL (ref 0–0.4)
EOSINOPHIL NFR BLD AUTO: 3 %
ERYTHROCYTE [DISTWIDTH] IN BLOOD BY AUTOMATED COUNT: 13 % (ref 11.7–15.4)
GLOBULIN SER CALC-MCNC: 2.3 G/DL (ref 1.5–4.5)
GLUCOSE SERPL-MCNC: 101 MG/DL (ref 70–99)
HBA1C MFR BLD: 5.7 % (ref 4.8–5.6)
HCT VFR BLD AUTO: 38.8 % (ref 34–46.6)
HCV AB S/CO SERPL IA: <0.1 S/CO RATIO (ref 0–0.9)
HDLC SERPL-MCNC: 55 MG/DL
HGB BLD-MCNC: 12.5 G/DL (ref 11.1–15.9)
IMM GRANULOCYTES # BLD AUTO: 0 X10E3/UL (ref 0–0.1)
IMM GRANULOCYTES NFR BLD AUTO: 1 %
LDLC SERPL CALC-MCNC: 85 MG/DL (ref 0–99)
LYMPHOCYTES # BLD AUTO: 2.1 X10E3/UL (ref 0.7–3.1)
LYMPHOCYTES NFR BLD AUTO: 27 %
MCH RBC QN AUTO: 27.2 PG (ref 26.6–33)
MCHC RBC AUTO-ENTMCNC: 32.2 G/DL (ref 31.5–35.7)
MCV RBC AUTO: 84 FL (ref 79–97)
MONOCYTES # BLD AUTO: 0.4 X10E3/UL (ref 0.1–0.9)
MONOCYTES NFR BLD AUTO: 6 %
NEUTROPHILS # BLD AUTO: 4.8 X10E3/UL (ref 1.4–7)
NEUTROPHILS NFR BLD AUTO: 62 %
PLATELET # BLD AUTO: 201 X10E3/UL (ref 150–450)
POTASSIUM SERPL-SCNC: 4.5 MMOL/L (ref 3.5–5.2)
PROT SERPL-MCNC: 6.6 G/DL (ref 6–8.5)
RBC # BLD AUTO: 4.6 X10E6/UL (ref 3.77–5.28)
SODIUM SERPL-SCNC: 136 MMOL/L (ref 134–144)
T4 FREE SERPL-MCNC: 0.84 NG/DL (ref 0.82–1.77)
TRIGL SERPL-MCNC: 180 MG/DL (ref 0–149)
TSH SERPL DL<=0.005 MIU/L-ACNC: 1.56 UIU/ML (ref 0.45–4.5)
VLDLC SERPL CALC-MCNC: 30 MG/DL (ref 5–40)
WBC # BLD AUTO: 7.6 X10E3/UL (ref 3.4–10.8)

## 2023-01-27 DIAGNOSIS — E66.01 MORBID (SEVERE) OBESITY DUE TO EXCESS CALORIES: ICD-10-CM

## 2023-01-27 RX ORDER — METFORMIN HYDROCHLORIDE 500 MG/1
TABLET, EXTENDED RELEASE ORAL
Qty: 30 TABLET | Refills: 1 | OUTPATIENT
Start: 2023-01-27

## 2023-01-27 NOTE — TELEPHONE ENCOUNTER
Rx Refill Note  Requested Prescriptions     Refused Prescriptions Disp Refills   • metFORMIN ER (GLUCOPHAGE-XR) 500 MG 24 hr tablet [Pharmacy Med Name: METFORMIN HCL  MG TABLET] 30 tablet 1     Sig: TAKE 1 TABLET BY MOUTH EVERY DAY WITH BREAKFAST      Last office visit with prescribing clinician: 1/5/2023   Last telemedicine visit with prescribing clinician: 2/7/2023   Next office visit with prescribing clinician: 2/7/2023       Refill left on Rx.                     Would you like a call back once the refill request has been completed: [] Yes [] No    If the office needs to give you a call back, can they leave a voicemail: [] Yes [] No    Shanta Bennett MA  01/27/23, 11:35 CST

## 2023-02-07 ENCOUNTER — OFFICE VISIT (OUTPATIENT)
Dept: INTERNAL MEDICINE | Facility: CLINIC | Age: 28
End: 2023-02-07
Payer: COMMERCIAL

## 2023-02-07 VITALS
DIASTOLIC BLOOD PRESSURE: 82 MMHG | BODY MASS INDEX: 46.26 KG/M2 | RESPIRATION RATE: 19 BRPM | TEMPERATURE: 97.7 F | WEIGHT: 271 LBS | OXYGEN SATURATION: 99 % | HEIGHT: 64 IN | SYSTOLIC BLOOD PRESSURE: 130 MMHG | HEART RATE: 93 BPM

## 2023-02-07 DIAGNOSIS — E66.01 MORBID (SEVERE) OBESITY DUE TO EXCESS CALORIES: Primary | ICD-10-CM

## 2023-02-07 PROCEDURE — 99213 OFFICE O/P EST LOW 20 MIN: CPT | Performed by: NURSE PRACTITIONER

## 2023-02-07 RX ORDER — METFORMIN HYDROCHLORIDE 500 MG/1
500 TABLET, EXTENDED RELEASE ORAL
Qty: 90 TABLET | Refills: 1 | Status: SHIPPED | OUTPATIENT
Start: 2023-02-07

## 2023-02-07 NOTE — PROGRESS NOTES
Subjective     Chief Complaint   Patient presents with   • Anxiety   • Obesity       History of Present Illness  The patient presents today for a follow-up visit.    The patient reports she is doing well on metformin. She denies any abdominal issues. Her weight this morning was 265 pounds. She has lost 7 pounds since her last visit. Her A1c was 5.7 percent. She states that she has not started taking vitamin D medication yet, but she plans to get some.    Her blood pressure looks good today.  Patient's PMR from outside medical facility reviewed and noted.    Review of Systems  Otherwise complete ROS reviewed and negative except as mentioned in the HPI.    Past Medical History:   Past Medical History:   Diagnosis Date   • ADHD (attention deficit hyperactivity disorder)    • Anxiety    • Hyperlipidemia    • Hypertension    • Hypothyroidism      Past Surgical History:History reviewed. No pertinent surgical history.  Social History:  reports that she has never smoked. She has never used smokeless tobacco. She reports that she does not drink alcohol and does not use drugs.    Family History: family history includes Depression in her mother; Diabetes in her mother; Heart disease in her maternal grandfather; Hyperlipidemia in her mother; No Known Problems in her father.      Allergies:  No Known Allergies  Medications:  Prior to Admission medications    Medication Sig Start Date End Date Taking? Authorizing Provider   metFORMIN ER (Glucophage XR) 500 MG 24 hr tablet Take 1 tablet by mouth Daily With Breakfast. 2/7/23  Yes Tanya Guthrie APRN   atomoxetine (STRATTERA) 40 MG capsule Take 40 mg by mouth Daily. 1/15/22   Charo Alcaraz MD   atorvastatin (LIPITOR) 40 MG tablet Take 40 mg by mouth Daily. 1/12/22   Charo Alcaraz MD   fluticasone (FLONASE) 50 MCG/ACT nasal spray 2 sprays. 9/22/21   Charo Alcaraz MD   hydroCHLOROthiazide (HYDRODIURIL) 12.5 MG tablet Take 12.5 mg by mouth  "Daily. 12/19/21   Charo Alcaraz MD   levothyroxine (SYNTHROID, LEVOTHROID) 50 MCG tablet Take 50 mcg by mouth Daily. 12/19/21   Charo Alcaraz MD   meloxicam (MOBIC) 15 MG tablet Take 15 mg by mouth Daily. 1/14/22   Charo Alcaraz MD   metoprolol succinate XL (TOPROL-XL) 100 MG 24 hr tablet Take 100 mg by mouth Daily. 10/27/21   Charo Alcaraz MD   Nortrel 7/7/7 0.5/0.75/1-35 MG-MCG per tablet  1/6/22   Charo Alcaraz MD   pantoprazole (PROTONIX) 40 MG EC tablet Take 40 mg by mouth Daily. 10/27/21   Charo Alcaraz MD   sertraline (ZOLOFT) 50 MG tablet Take 1 tablet by mouth Daily. 1/5/23   Tanya Guthrie APRN   spironolactone (ALDACTONE) 25 MG tablet Take 25 mg by mouth Daily. 12/19/21   Charo Alcaraz MD   traMADol (ULTRAM) 50 MG tablet Take 1 tablet by mouth Every 6 (Six) Hours As Needed for Moderate Pain . 4/19/22   Tanya Guthrie APRN   metFORMIN ER (Glucophage XR) 500 MG 24 hr tablet Take 1 tablet by mouth Daily With Breakfast. 1/5/23 2/7/23  Tanya Guthrie APRN       Objective     Vital Signs: /82   Pulse 93   Temp 97.7 °F (36.5 °C)   Resp 19   Ht 162.6 cm (64\")   Wt 123 kg (271 lb)   SpO2 99%   BMI 46.52 kg/m²   Physical Exam  Vitals reviewed.   Constitutional:       Appearance: She is well-developed. She is obese.   HENT:      Head: Normocephalic and atraumatic.   Eyes:      Pupils: Pupils are equal, round, and reactive to light.   Neck:      Vascular: No JVD.   Cardiovascular:      Rate and Rhythm: Normal rate and regular rhythm.   Pulmonary:      Effort: Pulmonary effort is normal.      Breath sounds: Normal breath sounds.   Abdominal:      General: Bowel sounds are normal.      Palpations: Abdomen is soft.   Musculoskeletal:         General: No deformity.      Cervical back: Normal range of motion and neck supple.   Lymphadenopathy:      Cervical: No cervical adenopathy.   Skin:     General: Skin is warm and dry. "   Neurological:      Mental Status: She is alert and oriented to person, place, and time.   Psychiatric:         Behavior: Behavior normal.         Thought Content: Thought content normal.         Judgment: Judgment normal.         Class 3 Severe Obesity (BMI >=40). Obesity-related health conditions include the following: GERD. Obesity is improving with treatment. BMI is is above average; BMI management plan is completed. We discussed low calorie, low carb based diet program, portion control, increasing exercise and pharmacologic options including metformin.      Results Reviewed:  Glucose   Date Value Ref Range Status   01/05/2023 101 (H) 70 - 99 mg/dL Final   03/13/2020 82 74 - 109 mg/dL Final     BUN   Date Value Ref Range Status   01/05/2023 16 6 - 20 mg/dL Final   03/13/2020 16 6 - 20 mg/dL Final     Creatinine   Date Value Ref Range Status   01/05/2023 0.81 0.57 - 1.00 mg/dL Final   03/13/2020 0.7 0.5 - 0.9 mg/dL Final     Sodium   Date Value Ref Range Status   01/05/2023 136 134 - 144 mmol/L Final   03/13/2020 139 136 - 145 mmol/L Final     Potassium   Date Value Ref Range Status   01/05/2023 4.5 3.5 - 5.2 mmol/L Final   03/13/2020 4.1 3.5 - 5.0 mmol/L Final     Chloride   Date Value Ref Range Status   01/05/2023 100 96 - 106 mmol/L Final   03/13/2020 100 98 - 111 mmol/L Final     CO2   Date Value Ref Range Status   03/13/2020 23 22 - 29 mmol/L Final     Total CO2   Date Value Ref Range Status   01/05/2023 22 20 - 29 mmol/L Final     Calcium   Date Value Ref Range Status   01/05/2023 9.2 8.7 - 10.2 mg/dL Final   03/13/2020 9.8 8.6 - 10.0 mg/dL Final     ALT (SGPT)   Date Value Ref Range Status   01/05/2023 19 0 - 32 IU/L Final   03/13/2020 15 5 - 33 U/L Final     AST (SGOT)   Date Value Ref Range Status   01/05/2023 14 0 - 40 IU/L Final   03/13/2020 15 5 - 32 U/L Final     WBC   Date Value Ref Range Status   01/05/2023 7.6 3.4 - 10.8 x10E3/uL Final   03/13/2020 10.2 4.8 - 10.8 K/uL Final     Hematocrit   Date  Value Ref Range Status   01/05/2023 38.8 34.0 - 46.6 % Final   03/13/2020 39.3 37.0 - 47.0 % Final     Platelets   Date Value Ref Range Status   01/05/2023 201 150 - 450 x10E3/uL Final   03/13/2020 266 130 - 400 K/uL Final     Triglycerides   Date Value Ref Range Status   01/05/2023 180 (H) 0 - 149 mg/dL Final   07/26/2018 113 0 - 149 mg/dL Final     HDL Cholesterol   Date Value Ref Range Status   01/05/2023 55 >39 mg/dL Final   07/26/2018 66 65 - 121 mg/dL Final     Comment:     VALUES>60 MG/DL ARE ASSOCIATED WITH A DECREASED RISK OF  ATHEROSCLEROTIC CARDIOVASCULAR DISEASE     LDL Cholesterol    Date Value Ref Range Status   07/26/2018 66 <100 mg/dL Final     Comment:     <100 MG/DL=OPITIMAL    100-129 MG/DL=DESIRABLE    130-159 MG/DL BORDERLINE=INCREASED RISK OF ATHEROSCLEROTIC  CARDIOVASCULAR DISEASE    > OR = 160 MG/DL=ASSOCIATED WITH AN INCREASE RISK OF  ATHEROSCLEROTIC CARDIOVASCULAR DISEASE     LDL Chol Calc (NIH)   Date Value Ref Range Status   01/05/2023 85 0 - 99 mg/dL Final     Hemoglobin A1C   Date Value Ref Range Status   01/05/2023 5.7 (H) 4.8 - 5.6 % Final     Comment:              Prediabetes: 5.7 - 6.4           Diabetes: >6.4           Glycemic control for adults with diabetes: <7.0           Assessment / Plan     Assessment/Plan:  Diagnoses and all orders for this visit:    1. Morbid (severe) obesity due to excess calories (HCC) (Primary)  -     metFORMIN ER (Glucophage XR) 500 MG 24 hr tablet; Take 1 tablet by mouth Daily With Breakfast.  Dispense: 90 tablet; Refill: 1      Return in about 1 month (around 3/7/2023). unless patient needs to be seen sooner or acute issues arise.    Code Status: Full.     I have discussed the patient results/orders and and plan/recommendation with them at today's visit.      TIAGO Clarke   02/07/2023    Transcribed from ambient dictation for TIAGO Clarke by Caitlin Bowden.  02/07/23   11:07 CST    Patient or patient representative  verbalized consent to the visit recording.  I have personally performed the services described in this document as transcribed by the above individual, and it is both accurate and complete.  Tanya Guthrie, APRN  2/12/2023  18:29 CST

## 2023-02-13 ENCOUNTER — OFFICE VISIT (OUTPATIENT)
Dept: PRIMARY CARE CLINIC | Age: 28
End: 2023-02-13

## 2023-02-13 VITALS
TEMPERATURE: 97.3 F | DIASTOLIC BLOOD PRESSURE: 80 MMHG | SYSTOLIC BLOOD PRESSURE: 122 MMHG | HEART RATE: 83 BPM | OXYGEN SATURATION: 98 % | BODY MASS INDEX: 45.83 KG/M2 | WEIGHT: 267 LBS

## 2023-02-13 DIAGNOSIS — J02.0 STREPTOCOCCAL PHARYNGITIS: Primary | ICD-10-CM

## 2023-02-13 DIAGNOSIS — J02.9 SORE THROAT: ICD-10-CM

## 2023-02-13 LAB — S PYO AG THROAT QL: POSITIVE

## 2023-02-13 ASSESSMENT — ENCOUNTER SYMPTOMS
ABDOMINAL PAIN: 0
RHINORRHEA: 0
DIARRHEA: 0
COUGH: 1
CONSTIPATION: 0
WHEEZING: 0
BLOOD IN STOOL: 0
EYE ITCHING: 0
SINUS PRESSURE: 0
VOMITING: 0
SHORTNESS OF BREATH: 0
SORE THROAT: 1
EYE DISCHARGE: 0
NAUSEA: 0
COLOR CHANGE: 0

## 2023-02-13 NOTE — LETTER
[facility] ([facility]) [facility] [address]  Phone: [phone]  Fax: [phone]    JUAN De Los Santos CNP        February 13, 2023     Patient: Dione Luna   YOB: 1995   Date of Visit: 2/13/2023       To Whom it May Concern:    Delroy Mosqueda was seen in my clinic on 2/13/2023. She may return to work on 2/14/2023. If you have any questions or concerns, please don't hesitate to call.     Sincerely,         JUAN De Los Santos CNP

## 2023-02-13 NOTE — PATIENT INSTRUCTIONS
-Finish full course of antibiotics for ear infection and add claritin and flonase.  -Monitor for fever and treat as needed with tylenol or ibuprofen  -Recommended throat lozenges as needed and salt water gargles three times daily  -Replace toothbrush in 24-48 hours after antibiotics are started  -The patient is to follow up with PCP or return to clinic if symptoms worsen/fail to improve.

## 2023-02-13 NOTE — PROGRESS NOTES
[address] [facility] [address] [facility]  Dept: [phone]  Dept Fax: [phone]  Loc: [phone]    Johnson Foote is a 28 y.o. female who presents today for her medical conditions/complaints as noted below. Johnson Foote is complaining of Pharyngitis (Went to [facility] and was dx with ear infection cefdinir, ears feel fine now just sore throat.) and Cough (Started over the weekend )        HPI:   Pharyngitis  This is a new problem. The current episode started in the past 7 days. The problem occurs constantly. The problem has been waxing and waning. Associated symptoms include coughing and a sore throat. Pertinent negatives include no abdominal pain, chest pain, chills, congestion, fatigue, fever, headaches, myalgias, nausea, rash or vomiting. The symptoms are aggravated by swallowing. She has tried acetaminophen (cefdinir) for the symptoms. The treatment provided mild relief. Was seen at [facility] over the weekend for an ear infection and sore throat. Tested negative for strep at [facility].  Prescribed cefdinir but states her throat is still hurting and would like another strep test.     Past Medical History:   Diagnosis Date    Allergic rhinitis     GERD (gastroesophageal reflux disease)     Hyperlipidemia     Hypertension     Hypothyroidism        Past Surgical History:   Procedure Laterality Date    CYST REMOVAL         Family History   Problem Relation Age of Onset    Diabetes Paternal Grandmother        Social History     Tobacco Use    Smoking status: Never    Smokeless tobacco: Never   Substance Use Topics    Alcohol use: No        Current Outpatient Medications   Medication Sig Dispense Refill    atorvastatin (LIPITOR) 40 MG tablet TAKE 1 TABLET BY MOUTH EVERY DAY AT NIGHT 30 tablet 0    sertraline (ZOLOFT) 25 MG tablet Take 1 tablet by mouth daily 30 tablet 0    hydroCHLOROthiazide (HYDRODIURIL) 12.5 MG tablet TAKE 1 TABLET BY MOUTH EVERY DAY 90 tablet 3    spironolactone (ALDACTONE) 25 MG tablet Take 1 tablet by mouth 2 times daily 180 tablet 3    meloxicam (MOBIC) 15 MG tablet Take 1 tablet by mouth daily 90 tablet 3    atomoxetine (STRATTERA) 40 MG capsule Take 1 capsule by mouth daily 90 capsule 3    levothyroxine (SYNTHROID) 50 MCG tablet Take 1 tablet by mouth Daily 90 tablet 3    NORTREL 7/7/7 0.5/0.75/1-35 MG-MCG per tablet TAKE 1 TABLET BY MOUTH EVERY DAY 84 tablet 3    metoprolol succinate (TOPROL XL) 100 MG extended release tablet Take 1 tablet by mouth daily 90 tablet 3    pantoprazole (PROTONIX) 40 MG tablet Take 1 tablet by mouth daily TAKE 1 TABLET BY MOUTH EVERY DAY 90 tablet 3    fluticasone (FLONASE) 50 MCG/ACT nasal spray 2 sprays by Each Nostril route daily 16 g 5    loratadine (EQ ALLERGY RELIEF) 10 MG tablet Take 1 tablet by mouth daily 30 tablet 3     No current facility-administered medications for this visit.       No Known Allergies    Health Maintenance   Topic Date Due    Varicella vaccine (1 of 2 - 2-dose childhood series) Never done    HIV screen  Never done    DTaP/Tdap/Td vaccine (1 - Tdap) Never done    Lipids  07/26/2019    Depression Monitoring  03/05/2022    Pap smear  03/14/2022    COVID-19 Vaccine (4 - Booster for Pfizer series) 03/27/2022    Flu vaccine  Completed    Hepatitis C screen  Completed    Hepatitis A vaccine  Aged Out    Hib vaccine  Aged Out    Meningococcal (ACWY) vaccine  Aged Out    Pneumococcal 0-64 years Vaccine  Aged Out       Subjective:   Review of Systems   Constitutional:  Negative for activity change, appetite change, chills, fatigue and fever.   HENT:  Positive for sore throat. Negative for congestion, ear pain, rhinorrhea and sinus pressure.    Eyes:  Negative for discharge and itching.   Respiratory:  Positive for cough. Negative for shortness of breath and wheezing.    Cardiovascular:  Negative for chest pain.   Gastrointestinal:  Negative for abdominal pain, blood in  stool, constipation, diarrhea, nausea and vomiting. Musculoskeletal:  Negative for myalgias. Skin:  Negative for color change and rash. Neurological:  Negative for dizziness and headaches. All other systems reviewed and are negative. Objective    Physical Exam  Vitals and nursing note reviewed. Constitutional:       General: She is not in acute distress. Appearance: Normal appearance. HENT:      Head: Normocephalic and atraumatic. Right Ear: Ear canal normal. A middle ear effusion is present. Tympanic membrane is erythematous (mild) and bulging. Left Ear: Ear canal normal. A middle ear effusion is present. Tympanic membrane is erythematous (mild) and bulging. Mouth/Throat:      Mouth: Mucous membranes are moist.      Pharynx: Posterior oropharyngeal erythema present. Eyes:      Extraocular Movements: Extraocular movements intact. Pupils: Pupils are equal, round, and reactive to light. Cardiovascular:      Rate and Rhythm: Normal rate and regular rhythm. Pulses: Normal pulses. Heart sounds: Normal heart sounds. No murmur heard. Pulmonary:      Effort: Pulmonary effort is normal. No respiratory distress. Breath sounds: Normal breath sounds. No wheezing. Skin:     General: Skin is warm. Capillary Refill: Capillary refill takes less than 2 seconds. Coloration: Skin is not pale. Findings: No rash. Neurological:      General: No focal deficit present. Mental Status: She is alert and oriented to person, place, and time. Deep Tendon Reflexes: Reflexes are normal and symmetric. Psychiatric:         Attention and Perception: Attention normal.         Mood and Affect: Mood normal.         Behavior: Behavior normal. Behavior is cooperative. Thought Content: Thought content normal.       /80   Pulse 83   Temp 97.3 °F (36.3 °C)   Wt 267 lb (121.1 kg)   SpO2 98%   BMI 45.83 kg/m²     Assessment         Diagnosis Orders   1. Streptococcal pharyngitis        2. Sore throat  POCT rapid strep A          Plan   Discussed with patient that cefdinir should treat any type of strep throat as well as the ear infection. Finish full course of antibiotics for ear infection and add claritin and flonase.  -Monitor for fever and treat as needed with tylenol or ibuprofen  -Recommended throat lozenges as needed and salt water gargles three times daily  -Replace toothbrush in 24-48 hours after antibiotics are started  -The patient is to follow up with PCP or return to clinic if symptoms worsen/fail to improve. Orders Placed This Encounter   Procedures    POCT rapid strep A         Results for orders placed or performed in visit on 02/13/23   POCT rapid strep A   Result Value Ref Range    Strep A Ag Positive (A) None Detected       No orders of the defined types were placed in this encounter. New Prescriptions    No medications on file        Return if symptoms worsen or fail to improve. Discussed use, benefits, and side effects of any prescribed medications. All patient questions were answered. Patient voiced understanding of care plan. Patient was given educational materials - see patient instructions below. Patient Instructions   -Sharlatash Kelli full course of antibiotics for ear infection and add claritin and flonase.  -Monitor for fever and treat as needed with tylenol or ibuprofen  -Recommended throat lozenges as needed and salt water gargles three times daily  -Replace toothbrush in 24-48 hours after antibiotics are started  -The patient is to follow up with PCP or return to clinic if symptoms worsen/fail to improve.       Electronically signed by JUAN Bowie CNP on 2/13/2023 at 11:00 AM

## 2023-03-07 ENCOUNTER — OFFICE VISIT (OUTPATIENT)
Dept: INTERNAL MEDICINE | Facility: CLINIC | Age: 28
End: 2023-03-07
Payer: COMMERCIAL

## 2023-03-07 VITALS
TEMPERATURE: 98.4 F | DIASTOLIC BLOOD PRESSURE: 85 MMHG | RESPIRATION RATE: 16 BRPM | OXYGEN SATURATION: 99 % | WEIGHT: 270 LBS | BODY MASS INDEX: 46.1 KG/M2 | HEART RATE: 92 BPM | HEIGHT: 64 IN | SYSTOLIC BLOOD PRESSURE: 137 MMHG

## 2023-03-07 DIAGNOSIS — E66.01 MORBID (SEVERE) OBESITY DUE TO EXCESS CALORIES: Primary | ICD-10-CM

## 2023-03-07 DIAGNOSIS — R73.03 PREDIABETES: ICD-10-CM

## 2023-03-07 PROCEDURE — 99213 OFFICE O/P EST LOW 20 MIN: CPT | Performed by: NURSE PRACTITIONER

## 2023-03-07 RX ORDER — METFORMIN HYDROCHLORIDE 500 MG/1
1000 TABLET, EXTENDED RELEASE ORAL
Qty: 60 TABLET | Refills: 3 | Status: SHIPPED | OUTPATIENT
Start: 2023-03-07

## 2023-03-07 NOTE — PROGRESS NOTES
Subjective     Chief Complaint   Patient presents with   • Obesity       History of Present Illness     Dayana Leavitt presents to the clinic today for follow-up visit to discuss prediabetes.     Ms. Leavitt reports feeling generally well. She is currently taking metformin 500 mg once daily. She denies experiencing any diarrhea, chest pain, or shortness of breath. The patient states she has been eating well, but denies exercising. She has lost some weight.    Patient's PMR from outside medical facility reviewed and noted.    Review of Systems   Constitutional: Negative for appetite change, chills, diaphoresis, fatigue, fever and unexpected weight change.   HENT: Negative for congestion, ear pain, postnasal drip, sore throat and trouble swallowing.    Eyes: Negative for pain and visual disturbance.   Respiratory: Negative for cough, chest tightness, shortness of breath and wheezing.    Cardiovascular: Negative for chest pain, palpitations and leg swelling.   Gastrointestinal: Negative for abdominal pain, blood in stool, constipation, diarrhea, nausea and vomiting.   Endocrine: Negative for cold intolerance, heat intolerance, polydipsia, polyphagia and polyuria.   Genitourinary: Negative for difficulty urinating, dysuria, frequency and urgency.   Musculoskeletal: Negative for gait problem.   Skin: Negative for rash.   Neurological: Negative for dizziness, seizures, syncope, weakness and headaches.   Hematological: Does not bruise/bleed easily.   Psychiatric/Behavioral: Negative for confusion. The patient is not nervous/anxious.         Otherwise complete ROS reviewed and negative except as mentioned in the HPI.    Past Medical History:   Past Medical History:   Diagnosis Date   • ADHD (attention deficit hyperactivity disorder)    • Anxiety    • Hyperlipidemia    • Hypertension    • Hypothyroidism      Past Surgical History:No past surgical history on file.  Social History:  reports that she has never smoked. She has  never used smokeless tobacco. She reports that she does not drink alcohol and does not use drugs.    Family History: family history includes Depression in her mother; Diabetes in her mother; Heart disease in her maternal grandfather; Hyperlipidemia in her mother; No Known Problems in her father.       Allergies:  No Known Allergies  Medications:  Prior to Admission medications    Medication Sig Start Date End Date Taking? Authorizing Provider   atomoxetine (STRATTERA) 40 MG capsule Take 40 mg by mouth Daily. 1/15/22   Charo Alcaraz MD   atorvastatin (LIPITOR) 40 MG tablet Take 40 mg by mouth Daily. 1/12/22   Charo Alcaraz MD   fluticasone (FLONASE) 50 MCG/ACT nasal spray 2 sprays. 9/22/21   Charo Alcaraz MD   hydroCHLOROthiazide (HYDRODIURIL) 12.5 MG tablet Take 12.5 mg by mouth Daily. 12/19/21   Charo Alcaraz MD   levothyroxine (SYNTHROID, LEVOTHROID) 50 MCG tablet Take 50 mcg by mouth Daily. 12/19/21   Charo Alcaraz MD   meloxicam (MOBIC) 15 MG tablet Take 15 mg by mouth Daily. 1/14/22   Charo Alcaraz MD   metFORMIN ER (Glucophage XR) 500 MG 24 hr tablet Take 1 tablet by mouth Daily With Breakfast. 2/7/23   Tanya Guthrie APRN   metFORMIN ER (GLUCOPHAGE-XR) 500 MG 24 hr tablet Take 2 tablets by mouth Daily With Breakfast. 3/7/23   Tanya Guthrie APRN   metoprolol succinate XL (TOPROL-XL) 100 MG 24 hr tablet Take 100 mg by mouth Daily. 10/27/21   Charo Alcaraz MD   Nortrel 7/7/7 0.5/0.75/1-35 MG-MCG per tablet  1/6/22   Charo Alcaraz MD   pantoprazole (PROTONIX) 40 MG EC tablet Take 40 mg by mouth Daily. 10/27/21   Charo Alcaraz MD   sertraline (ZOLOFT) 50 MG tablet Take 1 tablet by mouth Daily. 1/5/23   Tanya Guthrie APRN   spironolactone (ALDACTONE) 25 MG tablet Take 25 mg by mouth Daily. 12/19/21   Charo Alcaraz MD   traMADol (ULTRAM) 50 MG tablet Take 1 tablet by mouth Every 6 (Six) Hours As Needed for  "Moderate Pain . 4/19/22   Jerri Tanya Jacob, TIAGO       Objective     Vital Signs: /85   Pulse 92   Temp 98.4 °F (36.9 °C)   Resp 16   Ht 162.6 cm (64\")   Wt 122 kg (270 lb)   SpO2 99%   BMI 46.35 kg/m²   Physical Exam  Constitutional:       Appearance: She is well-developed. She is obese.   HENT:      Head: Normocephalic and atraumatic.   Eyes:      Conjunctiva/sclera: Conjunctivae normal.      Pupils: Pupils are equal, round, and reactive to light.   Neck:      Vascular: No JVD.   Cardiovascular:      Rate and Rhythm: Normal rate and regular rhythm.      Heart sounds: Normal heart sounds. No murmur heard.    No friction rub. No gallop.   Pulmonary:      Effort: Pulmonary effort is normal. No respiratory distress.      Breath sounds: Normal breath sounds. No wheezing or rales.   Chest:      Chest wall: No tenderness.   Abdominal:      General: Bowel sounds are normal. There is no distension.      Palpations: Abdomen is soft.      Tenderness: There is no abdominal tenderness. There is no guarding or rebound.   Musculoskeletal:         General: No tenderness or deformity. Normal range of motion.      Cervical back: Neck supple.   Skin:     General: Skin is warm and dry.      Findings: No rash.   Neurological:      Mental Status: She is alert and oriented to person, place, and time.      Cranial Nerves: No cranial nerve deficit.      Motor: No abnormal muscle tone.      Deep Tendon Reflexes: Reflexes normal.   Psychiatric:         Behavior: Behavior normal.         Thought Content: Thought content normal.         Judgment: Judgment normal.         Class 3 Severe Obesity (BMI >=40). Obesity-related health conditions include the following: hypertension. Obesity is improving with lifestyle modifications. BMI is is above average; BMI management plan is completed. We discussed portion control, increasing exercise and pharmacologic options including metformin.      Results Reviewed:  Glucose   Date Value " Ref Range Status   01/05/2023 101 (H) 70 - 99 mg/dL Final   03/13/2020 82 74 - 109 mg/dL Final     BUN   Date Value Ref Range Status   01/05/2023 16 6 - 20 mg/dL Final   03/13/2020 16 6 - 20 mg/dL Final     Creatinine   Date Value Ref Range Status   01/05/2023 0.81 0.57 - 1.00 mg/dL Final   03/13/2020 0.7 0.5 - 0.9 mg/dL Final     Sodium   Date Value Ref Range Status   01/05/2023 136 134 - 144 mmol/L Final   03/13/2020 139 136 - 145 mmol/L Final     Potassium   Date Value Ref Range Status   01/05/2023 4.5 3.5 - 5.2 mmol/L Final   03/13/2020 4.1 3.5 - 5.0 mmol/L Final     Chloride   Date Value Ref Range Status   01/05/2023 100 96 - 106 mmol/L Final   03/13/2020 100 98 - 111 mmol/L Final     CO2   Date Value Ref Range Status   03/13/2020 23 22 - 29 mmol/L Final     Total CO2   Date Value Ref Range Status   01/05/2023 22 20 - 29 mmol/L Final     Calcium   Date Value Ref Range Status   01/05/2023 9.2 8.7 - 10.2 mg/dL Final   03/13/2020 9.8 8.6 - 10.0 mg/dL Final     ALT (SGPT)   Date Value Ref Range Status   01/05/2023 19 0 - 32 IU/L Final   03/13/2020 15 5 - 33 U/L Final     AST (SGOT)   Date Value Ref Range Status   01/05/2023 14 0 - 40 IU/L Final   03/13/2020 15 5 - 32 U/L Final     WBC   Date Value Ref Range Status   01/05/2023 7.6 3.4 - 10.8 x10E3/uL Final   03/13/2020 10.2 4.8 - 10.8 K/uL Final     Hematocrit   Date Value Ref Range Status   01/05/2023 38.8 34.0 - 46.6 % Final   03/13/2020 39.3 37.0 - 47.0 % Final     Platelets   Date Value Ref Range Status   01/05/2023 201 150 - 450 x10E3/uL Final   03/13/2020 266 130 - 400 K/uL Final     Triglycerides   Date Value Ref Range Status   01/05/2023 180 (H) 0 - 149 mg/dL Final   07/26/2018 113 0 - 149 mg/dL Final     HDL Cholesterol   Date Value Ref Range Status   01/05/2023 55 >39 mg/dL Final   07/26/2018 66 65 - 121 mg/dL Final     Comment:     VALUES>60 MG/DL ARE ASSOCIATED WITH A DECREASED RISK OF  ATHEROSCLEROTIC CARDIOVASCULAR DISEASE     LDL Cholesterol    Date  Value Ref Range Status   07/26/2018 66 <100 mg/dL Final     Comment:     <100 MG/DL=OPITIMAL    100-129 MG/DL=DESIRABLE    130-159 MG/DL BORDERLINE=INCREASED RISK OF ATHEROSCLEROTIC  CARDIOVASCULAR DISEASE    > OR = 160 MG/DL=ASSOCIATED WITH AN INCREASE RISK OF  ATHEROSCLEROTIC CARDIOVASCULAR DISEASE     LDL Chol Calc ([facility])   Date Value Ref Range Status   01/05/2023 85 0 - 99 mg/dL Final     Hemoglobin A1C   Date Value Ref Range Status   01/05/2023 5.7 (H) 4.8 - 5.6 % Final     Comment:              Prediabetes: 5.7 - 6.4           Diabetes: >6.4           Glycemic control for adults with diabetes: <7.0           Assessment / Plan     Assessment/Plan:  Diagnoses and all orders for this visit:    1. Morbid (severe) obesity due to excess calories (HCC) (Primary)  -     metFORMIN ER (GLUCOPHAGE-XR) 500 MG 24 hr tablet; Take 2 tablets by mouth Daily With Breakfast.  Dispense: 60 tablet; Refill: 3    2. Prediabetes  -     metFORMIN ER (GLUCOPHAGE-XR) 500 MG 24 hr tablet; Take 2 tablets by mouth Daily With Breakfast.  Dispense: 60 tablet; Refill: 3      The patient was instructed to increase metformin to 1000 mg daily. She was encouraged to engage in diet and exercise. The patient will return to the clinic in 1 month.      No follow-ups on file. unless patient needs to be seen sooner or acute issues arise.    Code Status: Full.     I have discussed the patient results/orders and and plan/recommendation with them at today's visit.      TIAGO Clarke   03/07/2023    Transcribed from ambient dictation for TIAGO Clarke by Tanya Childs.  03/07/23   11:33 CST    Patient or patient representative verbalized consent to the visit recording.  I have personally performed the services described in this document as transcribed by the above individual, and it is both accurate and complete.  TIAGO Clarke  3/7/2023  22:21 CST

## 2023-04-04 ENCOUNTER — OFFICE VISIT (OUTPATIENT)
Dept: INTERNAL MEDICINE | Facility: CLINIC | Age: 28
End: 2023-04-04
Payer: COMMERCIAL

## 2023-04-04 VITALS
SYSTOLIC BLOOD PRESSURE: 130 MMHG | HEART RATE: 100 BPM | DIASTOLIC BLOOD PRESSURE: 83 MMHG | WEIGHT: 266 LBS | TEMPERATURE: 97.6 F | BODY MASS INDEX: 45.66 KG/M2 | OXYGEN SATURATION: 98 % | RESPIRATION RATE: 19 BRPM

## 2023-04-04 DIAGNOSIS — E66.01 MORBID (SEVERE) OBESITY DUE TO EXCESS CALORIES: ICD-10-CM

## 2023-04-04 DIAGNOSIS — R73.03 PREDIABETES: Primary | ICD-10-CM

## 2023-04-04 DIAGNOSIS — M72.2 PLANTAR FASCIITIS: ICD-10-CM

## 2023-04-04 PROBLEM — I10 HYPERTENSION: Status: ACTIVE | Noted: 2023-04-04

## 2023-04-04 PROBLEM — E78.2 MIXED HYPERLIPIDEMIA: Status: ACTIVE | Noted: 2018-07-13

## 2023-04-04 PROBLEM — E03.9 HYPOTHYROIDISM: Status: ACTIVE | Noted: 2023-04-04

## 2023-04-04 PROBLEM — J30.9 ALLERGIC RHINITIS: Status: ACTIVE | Noted: 2023-04-04

## 2023-04-04 LAB
EXPIRATION DATE: NORMAL
HBA1C MFR BLD: 5.4 %
Lab: NORMAL

## 2023-04-04 RX ORDER — METHYLPREDNISOLONE 4 MG/1
TABLET ORAL
Qty: 21 TABLET | Refills: 0 | Status: SHIPPED | OUTPATIENT
Start: 2023-04-04

## 2023-04-04 NOTE — PROGRESS NOTES
Machelle Abernathy is a 27 y.o. female.     History of Present Illness   Ms. ANNITA ABERNATHY is a 27-year-old female who presents today for follow-up on obesity.    Ms. ANNITA ABERNATHY is generally doing well. She is still losing weight. Her weight was 272 pounds in 01/2023. She is now weighing 266 pounds. She is currently taking metformin. Her A1c was 5.7 percent. Her vitamin D level is low, which she is taking a replacement. She denies diarrhea. She has not been exercising.    Ms. ANNITA ABERNATHY started to experience some issues with her on foot last week. She localizes the pain at the bottom of the side of the fifth toe. She notes slight pain in the arch of her foot.    The following portions of the patient's history were reviewed and updated as appropriate: past family history, past medical history, past social history and past surgical history.    Review of Systems    Objective   Physical Exam  Heel tenderness. Lateral and medial tenderness on the right foot.  Assessment & Plan   Diagnoses and all orders for this visit:    1. Prediabetes (Primary)  -     POC Glycosylated Hemoglobin (Hb A1C)  -     Semaglutide-Weight Management 0.25 MG/0.5ML solution auto-injector; Inject 0.25 mg under the skin into the appropriate area as directed 1 (One) Time Per Week.  Dispense: 2 mL; Refill: 0    2. Morbid (severe) obesity due to excess calories  -     Semaglutide-Weight Management 0.25 MG/0.5ML solution auto-injector; Inject 0.25 mg under the skin into the appropriate area as directed 1 (One) Time Per Week.  Dispense: 2 mL; Refill: 0    3. Plantar fasciitis  -     methylPREDNISolone (MEDROL) 4 MG dose pack; Take as directed on package instructions.  Dispense: 21 tablet; Refill: 0        Transcribed from ambient dictation for TIAGO Clarke by Caitlin Bowden.  04/04/23   11:41 CDT    Patient or patient representative verbalized consent to the visit recording.  I have personally performed the services described in  this document as transcribed by the above individual, and it is both accurate and complete.  Tanya Guthrie, TIAGO  4/9/2023  16:36 CDT

## 2023-04-24 DIAGNOSIS — R73.03 PREDIABETES: ICD-10-CM

## 2023-04-24 DIAGNOSIS — E66.01 MORBID (SEVERE) OBESITY DUE TO EXCESS CALORIES: ICD-10-CM

## 2023-04-24 RX ORDER — SEMAGLUTIDE 0.5 MG/.5ML
0.5 INJECTION, SOLUTION SUBCUTANEOUS WEEKLY
Qty: 2 ML | Refills: 0 | Status: SHIPPED | OUTPATIENT
Start: 2023-04-24

## 2023-05-19 DIAGNOSIS — E66.01 MORBID (SEVERE) OBESITY DUE TO EXCESS CALORIES: ICD-10-CM

## 2023-05-19 DIAGNOSIS — R73.03 PREDIABETES: ICD-10-CM

## 2023-05-19 NOTE — TELEPHONE ENCOUNTER
Rx Refill Note  Requested Prescriptions     Pending Prescriptions Disp Refills    Wegovy 0.5 MG/0.5ML solution auto-injector [Pharmacy Med Name: WEGOVY 0.5 MG/0.5 ML PEN]       Sig: INJECT 0.5 ML SUBCUTANEOUSLY INTO THE APPROPRIATE AREA AS DIRECTED ONCE WEEKLY      Last office visit with prescribing clinician: 4/4/2023   Last telemedicine visit with prescribing clinician: 4/24/2023   Next office visit with prescribing clinician: Visit date not found       PHARMACY NEEDS DIAGNOSIS CODE ON SCRIPT.                       Would you like a call back once the refill request has been completed: [] Yes [] No    If the office needs to give you a call back, can they leave a voicemail: [] Yes [] No    Shanta Bennett MA  05/19/23, 11:51 CDT

## 2023-05-20 DIAGNOSIS — I10 ESSENTIAL HYPERTENSION: ICD-10-CM

## 2023-05-22 RX ORDER — METOPROLOL SUCCINATE 100 MG/1
100 TABLET, EXTENDED RELEASE ORAL DAILY
Qty: 90 TABLET | Refills: 3 | OUTPATIENT
Start: 2023-05-22

## 2023-05-22 NOTE — TELEPHONE ENCOUNTER
Received fax from pharmacy requesting refill on pts medication(s). Pt was last seen in office on 09/22/21 and has a follow up scheduled for Visit date not found. Will send request to  [facility]  for authorization.      Requested Prescriptions     Pending Prescriptions Disp Refills    metoprolol succinate (TOPROL XL) 100 MG extended release tablet [Pharmacy Med Name: METOPROLOL SUCC  MG TAB] 90 tablet 3     Sig: TAKE 1 TABLET BY MOUTH EVERY DAY

## 2023-05-22 NOTE — TELEPHONE ENCOUNTER
Patient has not been seen here since Sept 2021. Patient may be following with JUAN Bocanegra now.  Patient needs appt with us if she wants to refill this

## 2023-05-23 RX ORDER — SEMAGLUTIDE 0.5 MG/.5ML
INJECTION, SOLUTION SUBCUTANEOUS
Qty: 2 ML | Refills: 0 | Status: SHIPPED | OUTPATIENT
Start: 2023-05-23 | End: 2023-05-25

## 2023-05-24 DIAGNOSIS — L68.0 HIRSUTISM: ICD-10-CM

## 2023-05-24 DIAGNOSIS — F98.8 OTHER SPECIFIED BEHAVIORAL AND EMOTIONAL DISORDERS WITH ONSET USUALLY OCCURRING IN CHILDHOOD AND ADOLESCENCE: ICD-10-CM

## 2023-05-25 DIAGNOSIS — Z30.41 ENCOUNTER FOR SURVEILLANCE OF CONTRACEPTIVE PILLS: ICD-10-CM

## 2023-05-25 DIAGNOSIS — E66.01 MORBID (SEVERE) OBESITY DUE TO EXCESS CALORIES: Primary | ICD-10-CM

## 2023-05-25 RX ORDER — METOPROLOL SUCCINATE 100 MG/1
TABLET, EXTENDED RELEASE ORAL
Qty: 90 TABLET | Refills: 3 | Status: SHIPPED | OUTPATIENT
Start: 2023-05-25

## 2023-05-25 RX ORDER — ATOMOXETINE 40 MG/1
CAPSULE ORAL
Qty: 90 CAPSULE | Refills: 3 | Status: SHIPPED | OUTPATIENT
Start: 2023-05-25

## 2023-05-25 RX ORDER — NORETHINDRONE AND ETHINYL ESTRADIOL 7 DAYS X 3
1 KIT ORAL DAILY
Qty: 84 TABLET | Refills: 3 | OUTPATIENT
Start: 2023-05-25

## 2023-05-25 RX ORDER — SEMAGLUTIDE 1 MG/.5ML
1 INJECTION, SOLUTION SUBCUTANEOUS WEEKLY
Qty: 2 ML | Refills: 1 | Status: SHIPPED | OUTPATIENT
Start: 2023-05-25

## 2023-05-25 RX ORDER — NORETHINDRONE AND ETHINYL ESTRADIOL 7 DAYS X 3
KIT ORAL
Qty: 84 TABLET | Refills: 3 | Status: SHIPPED | OUTPATIENT
Start: 2023-05-25

## 2023-05-25 RX ORDER — SPIRONOLACTONE 25 MG/1
TABLET ORAL
Qty: 180 TABLET | Refills: 3 | Status: SHIPPED | OUTPATIENT
Start: 2023-05-25

## 2023-05-25 NOTE — TELEPHONE ENCOUNTER
Rx Refill Note  Requested Prescriptions     Pending Prescriptions Disp Refills    atomoxetine (STRATTERA) 40 MG capsule [Pharmacy Med Name: ATOMOXETINE HCL 40 MG CAPSULE] 90 capsule 3     Sig: TAKE 1 CAPSULE BY MOUTH EVERY DAY    metoprolol succinate XL (TOPROL-XL) 100 MG 24 hr tablet [Pharmacy Med Name: METOPROLOL SUCC  MG TAB] 90 tablet 3     Sig: TAKE 1 TABLET BY MOUTH EVERY DAY    spironolactone (ALDACTONE) 25 MG tablet [Pharmacy Med Name: SPIRONOLACTONE 25 MG TABLET] 180 tablet 3     Sig: TAKE 1 TABLET BY MOUTH TWICE A DAY      Last office visit with prescribing clinician: 4/4/2023   Next office visit with prescribing clinician: Visit date not found                         Would you like a call back once the refill request has been completed: [] Yes [] No    If the office needs to give you a call back, can they leave a voicemail: [] Yes [] No    Ana Edwards RN  05/25/23, 07:03 CDT

## 2023-05-25 NOTE — TELEPHONE ENCOUNTER
Rx Refill Note  Requested Prescriptions     Pending Prescriptions Disp Refills    Nortrel 7/7/7 0.5/0.75/1-35 MG-MCG per tablet [Pharmacy Med Name: NORTREL 7-7-7-28 TABLET] 84 tablet 3     Sig: TAKE 1 TABLET BY MOUTH EVERY DAY      Last office visit with prescribing clinician: 4/4/2023    Next office visit with prescribing clinician: Visit date not found                         Would you like a call back once the refill request has been completed: [] Yes [] No    If the office needs to give you a call back, can they leave a voicemail: [] Yes [] No    Ana Edwards RN  05/25/23, 09:34 CDT

## 2023-05-26 DIAGNOSIS — I10 ESSENTIAL (PRIMARY) HYPERTENSION: ICD-10-CM

## 2023-05-26 DIAGNOSIS — E03.9 HYPOTHYROIDISM, UNSPECIFIED: ICD-10-CM

## 2023-05-26 DIAGNOSIS — M79.672 PAIN IN LEFT FOOT: ICD-10-CM

## 2023-05-26 DIAGNOSIS — R10.13 EPIGASTRIC PAIN: ICD-10-CM

## 2023-05-26 RX ORDER — PANTOPRAZOLE SODIUM 40 MG/1
TABLET, DELAYED RELEASE ORAL
Qty: 90 TABLET | Refills: 3 | Status: SHIPPED | OUTPATIENT
Start: 2023-05-26

## 2023-05-26 RX ORDER — HYDROCHLOROTHIAZIDE 12.5 MG/1
TABLET ORAL
Qty: 90 TABLET | Refills: 3 | Status: SHIPPED | OUTPATIENT
Start: 2023-05-26

## 2023-05-26 RX ORDER — MELOXICAM 15 MG/1
TABLET ORAL
Qty: 90 TABLET | Refills: 3 | Status: SHIPPED | OUTPATIENT
Start: 2023-05-26

## 2023-05-26 RX ORDER — LEVOTHYROXINE SODIUM 0.05 MG/1
TABLET ORAL
Qty: 90 TABLET | Refills: 3 | Status: SHIPPED | OUTPATIENT
Start: 2023-05-26

## 2023-05-26 NOTE — TELEPHONE ENCOUNTER
Rx Refill Note  Requested Prescriptions     Pending Prescriptions Disp Refills    hydroCHLOROthiazide (HYDRODIURIL) 12.5 MG tablet [Pharmacy Med Name: HYDROCHLOROTHIAZIDE 12.5 MG TB] 90 tablet 3     Sig: TAKE 1 TABLET BY MOUTH EVERY DAY    meloxicam (MOBIC) 15 MG tablet [Pharmacy Med Name: MELOXICAM 15 MG TABLET] 90 tablet 3     Sig: TAKE 1 TABLET BY MOUTH EVERY DAY    levothyroxine (SYNTHROID, LEVOTHROID) 50 MCG tablet [Pharmacy Med Name: LEVOTHYROXINE 50 MCG TABLET] 90 tablet 3     Sig: TAKE 1 TABLET BY MOUTH EVERY DAY    pantoprazole (PROTONIX) 40 MG EC tablet [Pharmacy Med Name: PANTOPRAZOLE SOD DR 40 MG TAB] 90 tablet 3     Sig: TAKE 1 TABLET BY MOUTH EVERY DAY      Last office visit with prescribing clinician: 4/4/2023   Last telemedicine visit with prescribing clinician: Visit date not found   Next office visit with prescribing clinician: Visit date not found                         Would you like a call back once the refill request has been completed: [] Yes [] No    If the office needs to give you a call back, can they leave a voicemail: [] Yes [] No    Jamila Gardner CMA  05/26/23, 09:07 CDT

## 2023-05-30 NOTE — TELEPHONE ENCOUNTER
Date of Surgery:   5/30/2023     Preop Dx:   Osteonecrosis of R hip.      Postop Dx:   Osteonecrosis of R hip.      Procedure:  Right total hip arthroplasty, direct anterior.      Attending Surgeon:  Wiliam Grubbs MD     Assistants:  Gina Jalloh MD     Anesthesia:   General anesthesia with ET tube     Complications:   None     EBL:   150cc     IV fluids:   See anesthesia records     Components used:  Biomet G7 Acetabular Cup, Size 54  Biomet Taperloc Stem, Size 11, standard offset  Neutral polyethylene liner, Size 36, vitamin E  Femoral Head, size 36mm +0     Findings:  Osteonecrosis of R hip.       Indications for procedure:  The patient presents with severe osteonecrosis of the right hip.  She has failed conservative treatments, such as activity modification, oral anti-inflammatory medication, and physical therapy.  We recommended surgical treatment in the form of a total hip arthroplasty.  The risks, benefits, and alternatives to surgery were explained at length with the patient.  The risks include but are not limited to pain, bleeding, infection, damage to surrounding structures, loosening, mechanical hardware failure, dislocation, leg-length discrepancy, blood clots, COVID-19, the need for future procedures, as well as the risks of anesthesia itself.  The patient understood these risks, agreed to have surgery, and voluntarily signed a written informed consent.     Description of Procedure:  The patient was identified in the preoperative holding area by Dr. Grubbs, and the right lower extremity was then marked.  The patient was then greeted by the anesthesia team who brought the patient back to the operating theater.  General anesthesia was induced without complications. The patient was then transferred onto the [place] table in the supine position.  The right lower extremity was then prepped and draped in usual sterile fashion.  A timeout was performed confirming the patient name, date of birth, procedure to be  Received fax from pharmacy requesting refill on pts medication(s). Pt was last seen in office on 3/13/2020  and has a follow up scheduled for Visit date not found. Will send request to  Dr. Dominga Preston  for patient.      Requested Prescriptions     Pending Prescriptions Disp Refills    levothyroxine (SYNTHROID) 50 MCG tablet [Pharmacy Med Name: LEVOTHYROXINE 50 MCG TABLET] 90 tablet 1     Sig: TAKE 1 TABLET BY MOUTH EVERY DAY performed, as well as laterality.  Perioperative antibiotics were given to the patient prior to commencement of surgery.       An 8 cm skin incision was performed 2 cm lateral and 2 centimeters distal to the ASIS centered over the tensor fascia genie muscle belly.  This was carried down to the fascial layer which was incised in line with the skin, and retracted medially with Allis clamp.  Using blunt palpation the tensor fascia genie muscle belly was isolated and retracted laterally. The space immediately above the femoral neck was palpated with blunt palpation and a cobra retractor was placed. The lateral femoral circumflex vessels were visualized, isolated, coagulated using Erica clamp and suture ligation.  The floor of the fascial compartment was then incised providing visualization of the intracapsular fat pad.  This fat pad was excised using rongeur.  A second cobra was placed along the inferior aspect of the femoral neck and capsule.  When appropriate visualization of the capsule was verified, the capsule was incised.  The superior leaflet tagged with a # 1 Ethibond. The 2 Cobra retractors were moved into the intra-capsular position. Next, we performed the neck cut using a napkin ring technique.  The femoral head was extracted without any difficulty.    Attention was then directed at the acetabulum with retractors placed in the anterior, posterior proximal, posterior distal positions. This provided adequate visualization of the acetabulum. The pulvinar was excised.  Any remaining labrum was excised.  A starting 48mm reamer was utilized to carry the reaming down to the appropriately templated depth of the cup.    The acetabulum was sequentially reamed up to a 53 mm reamer for a 54 mm cup.  The final cup was placed under fluoroscopy in an optimal position. The real 36mm neutral vitamin E liner was then placed with the lip facing anteriorly.     Next, we turned our attention to the proximal femur.  A bone hook was  used to lift the femur anteriorly.  The leg was externally rotated and extended, and a deducted.  We released the remaining capsule as well as the conjoined tendon.  This release allowed excellent exposure of the proximal femur.  We then placed the femoral elevator hook in a loose unconnected fashion with the hook initially pointed proximally towards the patient's head then sliding between the tensor and vastus lateralis, with the hook sitting approximately at the vastus ridge or just distal to it.  The hip was externally rotated, then extended to the floor and adducted.  The femoral hook then connected to the bracket.  And a blunt Hohmann retractor placed over the medial calcar.  A Castaneda retractor then placed proximally.  Once appropriate visualization of the proximal femur was obtained,  the lateral cortex was debrided by using a rongeur.  Next, a canal finder was introduced into the medullary canal.  We then began sequentially broaching the proximal femur up to a size 11.  It was noted that our neck cut was a bit high, and a calcar reamer was used to ream down the femoral neck.  A standard neck and a +0 head was used for trialing.  All retractors were removed.  The hip was brought out of adduction and extension.  Traction and internal rotation was applied to reduce the hip.     Fluoroscopy was utilized to verify appropriate trial position, with regards to the neck resection height, the fill of the proximal metaphyseal region, rotation under live fluoroscopy was utilized to verify appropriate intramedullary position. Appropriate leg length was noted by placing a metallic bessie along the inferior ischium and comparing to position of the lesser trochanter bilaterally.  Which demonstrated appropriate leg length within 2 to 3 mm.      Next, we dislocated the hip using a bone hook.  The trial head and neck were removed.  The retractors were placed and expose the proximal femur.  The broach was removed.  Next, we  exposed the proximal femur, and the real implant (Taperloc size 11, standard offset) was placed in the proximal femur.  A 36mm +0 ceramic head was then impacted onto the femur.We then reduced the hip.     Copious amounts of normal saline was used to irrigate the wound.  The capsule was closed with #1 Ethibond.  The hip joint, capsule, and surrounding subcutaneous tissues were injected with local anesthetic.  The fascial layer was closed with 0 Vicryl, the subcutaneous layer was closed with 2-0 Vicryl, and the skin was closed with 3-0 PDS.  The wound was then dressed with Dermabond, alginate, and Tegaderms.       The patient was then awoken from anesthesia without any complications.  The patient was then transferred onto the hospital bed and taken to the PACU without any complications.  Multiple sponge and needle counts were performed and were correct at the end of the case.  Dr. Grubbs was present and participated throughout the key portions of the procedure.      Postoperative plan:  WBAT with anterior hip precautions  Post-operative antibiotics   Post-operative XR  DVT prophylaxis  Wound Care: Alginate, tegaderm to be changed PRN by ortho PRN  Pain control  PT/OT, Out of bed  Labs: Trend Hgb on PODs #1 & 2     Gina Jalloh MD  Adult Reconstructive Surgery Fellow  Department of Orthopaedic Surgery, [facility]    Attending MD (Dr. Wiliam Grubbs) Attestation:  I was present during the key portions of the procedure and I was immediately available for the entire procedure between opening and closing.    MD Madhu Cramer Professor  Oncology and Adult Reconstructive Surgery  Dept Orthopaedic Surgery, [facility]

## 2023-08-28 RX ORDER — SEMAGLUTIDE 1.7 MG/.75ML
1.7 INJECTION, SOLUTION SUBCUTANEOUS WEEKLY
Qty: 3 ML | Refills: 1 | Status: SHIPPED | OUTPATIENT
Start: 2023-08-28

## 2023-09-18 ENCOUNTER — E-VISIT (OUTPATIENT)
Dept: FAMILY MEDICINE CLINIC | Facility: TELEHEALTH | Age: 28
End: 2023-09-18

## 2023-09-18 NOTE — EXTERNAL PATIENT INSTRUCTIONS
Diagnosis   Strep pharyngitis (strep throat)   My name is TIAGO Pitts, and I'm a healthcare provider at [facility]. I reviewed your interview, and I see that you have strep throat. This can be a painful condition, but it responds well to treatment.   To prevent the spread of illness to others, I recommend that you stay home and away from other people as much as possible while you're sick. When you need to be around other people, consider wearing a face mask.   Medications   Your pharmacy   [facility]/pharmacy #[id number] [address] [address] [phone]     Prescription   Amoxicillin (500mg): Take 1 capsule by mouth twice a day for 10 days for infection. This medication is an antibiotic. Take it exactly as directed. You must finish the entire course of medication, even if you feel better after the first few days of treatment.    Start taking the antibiotics I've prescribed right away. You need to finish the entire course of antibiotics, even if you start to feel better before the pills run out.    Some people develop a yeast infection after taking antibiotics. If you get a yeast infection, you can treat it with antifungal creams or suppositories. These are available without a prescription at [employer] and many supermarkets.   About your diagnosis   Strep throat is an infection in the throat and tonsils caused by group A streptococcus bacteria. Strep throat is most often spread by droplets that are released into the air after an infected person coughs or sneezes. You can also get strep throat by sharing cups or utensils with an infected person.   Symptoms usually begin within 2 to 5 days after a person has been exposed. The pain from strep throat usually starts quickly and can be severe, especially when swallowing. Body aches and pains are common.   What to expect   If you follow this treatment plan, you should start to feel better within a few days.   When to seek care   Call us at [phone]  "  with any sudden or unexpected symptoms.    Symptoms that don't improve within 48 hours of starting antibiotics.    Symptoms that get better for a few days and then suddenly get worse.    Worsening fever.    Your throat pain becomes worse and makes swallowing extremely difficult or impossible.    Muffled voice (it may sound like you are talking with a mouthful of food).    Difficulty opening your mouth or jaw.    Stiff neck.    Joint pain, or swelling that moves from joint to joint.    Severe stomach pain.    Shortness of breath.    Nosebleed.    Severe fatigue.    A new rash.    Odd emotional or behavioral changes.    Uncontrollable body movements or \"jerkiness.\"    Severe chest pain.   Other treatment    Rest and drink plenty of water.    Choose throat-friendly liquids and foods, such as lemon tea, broth, applesauce, frozen yogurt, or popsicles.    Gargle with salt water several times a day to help with your throat pain.    Try cough drops and throat lozenges for extra relief.    Stir a teaspoon of honey into warm water or weak tea to help soothe a sore throat.    Avoid smoke and air pollution. Smoke can make infections worse.   Prevention    Avoid close contact with other people when you're sick.    Cover your mouth and nose when you cough or sneeze. Use a tissue or cough into your elbow. Make sure that used tissues go directly into the trash.    Avoid touching your eyes, nose, or mouth while you're sick.    Wash your hands often, especially after coughing, sneezing, or blowing your nose. If soap and water are not available, use an alcohol-based hand .    If you or someone in your home or workplace is sick, disinfect commonly used items. This includes door handles, tables, computers, remotes, and pens.    Coronavirus (COVID-19) information   Common symptoms of COVID-19 include fever, cough, shortness of breath, fatigue, muscle or body aches, headaches, new loss of sense of taste or smell, sore throat, " stuffy or runny nose, nausea or vomiting, and diarrhea. Most people who get COVID-19 have mild symptoms and can rest at home until they get better. Elderly people and those with chronic medical problems may be at risk for more serious complications.   FAQs about the COVID-19 vaccine   There are four authorized COVID-19 vaccines: Pramod & Pramod's Jacob Vaccine (J&J/Jacob), Moderna, Novavax, and Pfizer-BioNTech (Pfizer). The J&J/Jacob and Novavax vaccines are approved for use in people aged 18 and older. The Moderna and Pfizer vaccines are approved for those aged 6 months and older. All four are available at no cost. Even if you don't have health insurance, you can still get the COVID-19 vaccine for free.   Which vaccine is the best? Which vaccine should I get?   All four vaccines are highly effective. Even if you get COVID-19 after being vaccinated, all of the vaccines help prevent severe disease, hospitalization, and complications.   Most people should get whichever vaccine is first available to them. However, women younger than 50 years old should consider the rare risk of blood clots with low platelets after vaccination with the J&J/Jacob vaccine. This risk hasn't been seen with the other three vaccines.   Are the vaccines safe?   Yes. Hundreds of millions of people in the [place] have already safely received COVID-19 vaccines under the most intense safety monitoring in the history of the [place].   Do I need the vaccine if I've already had COVID?   Yes. Vaccination helps protect you even if you've already had COVID.   If you had COVID-19 and had symptoms, wait to get vaccinated until you've recovered and completed your isolation period.   If you tested positive for COVID-19 but did not have symptoms, you can get vaccinated after 5 full days have passed since you had a positive test, as long as you don't develop symptoms.   How many doses of the vaccine do I need?   Visit  www.cdc.gov/coronavirus/2019-ncov/vaccines/stay-up-to-date.html   to find out how to stay up to date with your COVID-19 vaccines.   I'm immunocompromised. How many doses of the vaccine do I need?   For information on how immunocompromised people can stay up to date with their COVID-19 vaccines, visit www.cdc.gov/coronavirus/2019-ncov/vaccines/recommendations/immuno.html  .   What are the common side effects of the vaccine?   A sore arm, tiredness, headache, and muscle pain may occur within two days of getting the vaccine and last a day or two. For the Moderna or Pfizer vaccines, side effects are more common after the second dose. People over the age of 55 are less likely to have side effects than younger people.   After I'm up to date on vaccines, can I still get or spread COVID?   Yes, you can still get COVID, but your disease should be milder. And your risk of serious illness, hospitalization, and complications will be much lower, especially if you're up to date. Unfortunately, you can still spread COVID if you've been vaccinated. That's why it's important to follow isolation guidelines if you get sick or test positive.   After I'm up to date on vaccines, can I go back to normal?   You should still wear a mask indoors in public if:    It's required by laws, rules, regulations, or local guidance.    You have a weakened immune system.    Your age puts you at increased risk of severe disease.    You have a medical condition that puts you at increased risk of severe disease.    Someone in your household has a weakened immune system, is at increased risk for severe disease, or is unvaccinated.    You're in an area of high transmission.   Where can I get a COVID-19 vaccine?   Visit [facility]'s website for more information. To find a COVID-19 vaccination site near you, visit www.vaccines.gov/  , call [phone]  , or text your zip code to 272410 ([place]). Message and data rates may apply.   What about travel?    Travel increases your risk of exposure to COVID-19. For more information, see www.cdc.gov/coronavirus/2019-ncov/travelers/index.html  .   I've had close contact with someone who has COVID. Do I need to quarantine, and if so, for how long?   For the most current answer, including a calculator to determine whether you need to stay home and for how long, visit www.cdc.gov/coronavirus/2019-ncov/your-health/quarantine-isolation.html  .   I've tested positive for COVID. How long do I need to isolate?   For the latest recommendations, including a calculator to determine how long you need to stay home, visit www.cdc.gov/coronavirus/2019-ncov/your-health/quarantine-isolation.html  .   What if I develop symptoms that might be from COVID?   For the latest recommendations on what to do if you're sick, including when to seek emergency care, visit www.cdc.gov/coronavirus/2019-ncov/if-you-are-sick/steps-when-sick.html  .    Flu vaccine information   Who should get a flu vaccine?   Everyone 6 months of age and older should get a yearly flu vaccine.   When should I get vaccinated?   You should get a flu vaccine by the end of October. Once you're vaccinated, it takes about two weeks for antibodies to develop and protect you against the flu. That's why it's important to get vaccinated as soon as possible.   After October, is it too late to get vaccinated?   No. You should still get vaccinated. As long as the flu viruses are still in your community, flu vaccines will remain available, even into January of next year or later.   Why do I need a flu vaccine EVERY year?   Flu viruses are constantly changing, so flu vaccines are usually updated from one season to the next. Your protection from the flu vaccine also lessens over time.   Is the flu vaccine safe?   Yes. Over the last 50 years, hundreds of millions of Americans have safely received the flu vaccines.   What are the side effects of flu vaccines?   You CANNOT get the flu from a flu  vaccine. Common side effects of the flu shot include soreness, redness and/or swelling where the shot was given, low grade fever, and aches. Common side effects of the nasal spray flu vaccine for adults include runny nose, headaches, sore throat, and cough. For children, side effects include wheezing, vomiting, muscle aches, and fever.   Does the flu vaccine increase your risk of getting COVID-19?   No. There is no evidence that getting a flu vaccine increases your risk of getting COVID-19.   Is it safe to get the flu vaccine along with a COVID-19 vaccine?   Yes. It's safe to get the flu vaccine with a COVID-19 vaccine or booster.   Contact your healthcare provider TODAY for details on when and where to get your flu vaccine.   Your provider   Your diagnosis was provided by TIAGO Pitts, a member of your trusted care team at [facility].   If you have any questions, call us at [phone]  .

## 2023-09-18 NOTE — E-VISIT TREATED
Chief Complaint: Cold, flu, COVID, sinus, hay fever, or seasonal allergies   Patient introduction   Patient is 28-year-old female with sore throat that started less than 48 hours ago. Regarding date of symptom onset, patient writes: 09/17/23.   COVID-19 testing history, vaccination status, and exposure:    Patient did a self-test within the last 24 hours. Test result was negative.    Received 3 doses of the COVID-19 vaccine (Pfizer, Pfizer, Pfizer). Received their most recent dose of the vaccine more than 14 days ago.    No known exposure to a person with a confirmed or suspected case of COVID-19.    No travel outside local community within the last 14 days.   Risk factors for severe disease from COVID-19 infection    BMI >= 25.    Healthcare worker.    Hypertension.   Warning. The following may warrant further investigation:    Hypertension.   General presentation   Symptoms came on gradually.   Fever:    No fever.   Sinus and nasal symptoms:    1 to 3 episodes of antibiotic treatment for sinus infection in the last year.    Sinus pain or pressure on or around the cheeks.    Patient first noticed sinus pain less than 5 days ago.    Sinus pain is not worse with Valsalva.    No nasal or sinus congestion.    No nasal discharge.    No itchy nose or sneezing.    No history of unhealed nasal septal ulcer/nasal wound.    No history of deviated septum or nasal polyps.   Throat symptoms:    Sore throat. Pain is bilateral but worse on the left side.    Has had recent strep exposure.    Patient thinks they have strep.    Has discomfort when swallowing but can swallow liquids and solid foods.   Head and body aches:    No headache.    No sweats.    No chills.    No myalgia.    No fatigue.   Cough:    No cough.   Wheezing and shortness of breath:    No COPD diagnosis.    No asthma diagnosis.    No wheezing.    No shortness of breath.   Chest pain:    No chest pain.   Ear symptoms:    None.   Dizziness:    No dizziness.    Allergies:    No history of allergies.   Flu exposure:    No recent known exposure to a person with a confirmed flu diagnosis.    Has not had a flu vaccine this season.   Not taking any over-the-counter medications for current symptoms.   Review of red flags/alarm symptoms:    No changes in alertness or awareness.    No nuchal rigidity.    No symptoms suggesting airway obstruction.    No decreased urination.   Pregnancy/menstrual status/breastfeeding:    Not pregnant.    Not breastfeeding.    Regarding date of last menstrual period, patient writes: End of August .   Self-exam:    Height: 162 centimeters    Weight: 105.5 kilograms    No difficulty moving their chin toward their chest.    Tonsillar edema.    Purulent tonsils.    Palatal petechiae.    Swollen/painful neck lymph nodes.   Recent antibiotic use:    Has not taken antibiotics for similar symptoms within the past month.   Current medications   Currently taking traMADol 50 MG tablet, fluticasone 50 MCG/ACT nasal spray, and methylPREDNISolone 4 MG dose pack.   Medication allergies   None.   Medication contraindication review   Patient has history of hypertension and thyroid disease. Therefore, the following medication(s) will not be prescribed:    Metoclopramide.    Acetaminophen-diphenhydramine-phenylephrine.    Pseudoephedrine.    Aspirin-chlorpheniramine-phenylephrine.   No history of metoclopramide-associated dystonic reaction and tardive dyskinesia.   No known history of amoxicillin-clavulanate-associated cholestatic jaundice or hepatic impairment.   No known history of azithromycin-associated cholestatic jaundice or hepatic impairment.   Past medical history   Immune conditions: No immunocompromising conditions.   No history of cancer.   Social history   Never smoked tobacco.   High-risk household contacts:    Household contact with diabetes.   Patient did not request an excuse note.   Assessment   Strep pharyngitis.   This is the likely diagnosis based  on patient's interview responses, including:    Symptom profile    Recent strep exposure   Plan   Medications:    amoxicillin 500 mg capsule RX 500mg 1 cap PO bid 10d for infection. This medication is an antibiotic. Take it exactly as directed. You must finish the entire course of medication, even if you feel better after the first few days of treatment. Amount is 20 cap.   The patient's prescription will be sent to:   [facility]/pharmacy #[id number] [address] [address]   Phone: [phone]     Fax: [phone]   Education:    Condition and causes    Prevention    Treatment and self-care    When to call provider   ----------   Electronically signed by TIAGO Pitts on 2023-09-18 at 19:36PM   ----------   Patient Interview Transcript:   Which of these symptoms are bothering you? Select all that apply.    Sore throat   Not selected:    Cough    Shortness of breath    Stuffed-up nose or sinuses    Runny nose    Itchy or watery eyes    Itchy nose or sneezing    Loss of smell or taste    Hoarse voice or loss of voice    Headache    Fever    Sweats    Chills    Muscle or body aches    Fatigue or tiredness    Nausea or vomiting    Diarrhea    I don't have any of these symptoms   When did your current symptoms start? Select one.    Less than 48 hours ago   Not selected:    3 to 5 days ago    6 to 9 days ago    10 to 14 days ago    2 to 3 weeks ago    3 to 4 weeks ago    More than a month ago   Do you know the exact date your symptoms started? If so, enter the date as MM/DD/YY. Select one.    Yes (specify): 09/17/23   Not selected:    No   Did your symptoms come on suddenly or gradually? Select one.    Gradually   Not selected:    Suddenly    I'm not sure   Since your current symptoms started, have you had a viral test for COVID-19? - This includes home self-tests as well as nose swab or saliva tests done at a doctor's office, lab, or testing site. - It does NOT include antibody tests, which use a blood sample  "to test for past infection. Select one.    Yes   Not selected:    No   When was your most recent COVID-19 test? Select one.    Within the last 24 hours   Not selected:    Within the last week (specify date as MM/DD/YY)    7 to 14 days ago    15 to 30 days ago    More than 1 month ago   What type of COVID-19 test did you most recently have? Select one.    Viral self-test or home test   Not selected:    Viral test at a doctor's office, lab, or testing site   What was the result of your most recent COVID-19 test? Select one.    Negative   Not selected:    Positive   Have you gotten the COVID-19 vaccine? Select one.    Yes   Not selected:    No   How many total doses of the COVID-19 vaccine have you gotten? This includes boosters as well as additional doses for those who are immunocompromised. Select one.    3 doses   Not selected:    1 dose    2 doses    4 doses    5 doses   1st dose    Pfizer   Not selected:    J&J/Jacob    Moderna    Novavax   2nd dose    Pfizer   Not selected:    J&J/Jacob    Moderna    Novavax   3rd dose    Pfizer   Not selected:    J&J/Jacob    Moderna    Novavax   When did you get your most recent dose of the COVID-19 vaccine?    More than 14 days ago   Not selected:    Less than 48 hours (2 days) ago    48 to 72 hours (3 days) ago    3 to 5 days ago    5 to 7 days ago    7 to 14 days ago   In the last 14 days, have you traveled outside of your local community? This includes travel by car, RV, bus, train, or plane. Travel increases your chances of getting and spreading COVID-19. Select one.    No   Not selected:    Yes   In the last 14 days, have you had close contact with someone who has coronavirus (COVID-19)? \"Close contact\" means any of these: - Living in the same household as someone with COVID-19. - Caring for someone with COVID-19. - Being within 6 feet of someone with COVID-19 for a total of at least 15 minutes over a 24-hour period. For example, three 5-minute exposures for a total " of 15 minutes. - Being in direct contact with respiratory droplets from someone with COVID-19 (being coughed on, kissing, sharing utensils). Select one.    No, not that I know of   Not selected:    Yes, a confirmed case    Yes, a suspected case   Do you feel sinus pain or pressure in any of these areas?    In my cheeks   Not selected:    In my forehead    Around my eyes    Behind my nose    In my upper teeth or jaw    No   When did you first notice your sinus pain or pressure? Select one.    Less than 5 days ago   Not selected:    5 to 9 days ago    10 to 14 days ago    2 to 4 weeks ago    1 month ago or longer   Does coughing, sneezing, or leaning forward make your sinuses feel worse? Select one.    No   Not selected:    Yes   Can you swallow liquids and solid foods? A sore throat may be painful when swallowing, but it shouldn't prevent you from swallowing. Select one.    Yes, but it's uncomfortable   Not selected:    Yes, with ease    Yes, but it's painful    It's hard to swallow anything because it feels like liquids and food get stuck in my throat    No, I can't swallow anything, liquid or solid foods   Is your throat pain worse on one side than the other? Select one.    Yes, it's worse on the left side   Not selected:    Yes, it's worse on the right side    No, it's the same on both sides   Which of these best describes your throat pain? Select one.    Pain on both sides, but worse on the left   Not selected:    Severe pain on the left, and little to no pain on the right   Since your symptoms started, have you felt dizzy? Select one.    No   Not selected:    Yes, but I can continue with my regular daily activities    Yes, and it makes it hard to stand, walk, or do daily activities   Do you have chest pain? You might also feel it as discomfort, aching, tightness, or squeezing in the chest. Select one.    No   Not selected:    Yes   Have you urinated at least 3 times in the last 24 hours? Select one.    Yes   Not  selected:    No   Changes in alertness or awareness may mean you need emergency care. Since your symptoms started, have you had any of these? Select all that apply.    None of the above   Not selected:    Confusion    Slurred speech    Not knowing where you are or what day it is    Difficulty staying conscious    Fainting or passing out   Do your symptoms include a whistling sound, or wheezing, when you breathe? Select one.    No   Not selected:    Yes    I'm not sure   Do you have any of these symptoms in your ear(s)? Select all that apply.    None of the above   Not selected:    Pain    Pressure    Fullness    Crackling or popping    Plugged or blocked sensation   Can you move your chin toward your chest?    Yes   Not selected:    No, my neck is too stiff   Are your tonsils larger than usual?    Yes   Not selected:    No, not that I can tell    I've had my tonsils removed   Is there any white or yellow pus on your tonsils?    Yes   Not selected:    No, not that I can see   Are there red spots on the roof of your mouth or the back of your throat?    Yes   Not selected:    No, not that I can see   Are your glands/lymph nodes swollen, or does it hurt when you touch them?    Yes   Not selected:    No, not that I can tell   In the past 2 weeks, has anyone around you (such as at school, work, or home) had a confirmed diagnosis of strep throat? A confirmed diagnosis means that a throat swab and lab test were done to verify a strep throat infection. Select one.    Yes   Not selected:    No, not that I know of   Do you think you might have strep throat? Select one.    Yes   Not selected:    No   In the past week, has anyone around you (such as at school, work, or home) had a confirmed diagnosis of the flu? A confirmed diagnosis means that a nose swab was done to verify a flu infection. Select all that apply.    No, not that I know of   Not selected:    I live with someone who has the flu    I've been within touching  distance of someone who has the flu    I've walked by, or sat about 3 feet away from, someone who has the flu    I've been in the same building as someone who has the flu   Have you ever been diagnosed with asthma? Select one.    No   Not selected:    Yes   Have you ever been diagnosed with chronic obstructive pulmonary disease (COPD)? Select one.    No, not that I know of   Not selected:    Yes   In the past month, have you taken antibiotics for similar symptoms? Examples of antibiotics include amoxicillin, amoxicillin-clavulanate (Augmentin), penicillin, cefdinir (Omnicef), doxycycline, and clindamycin (Cleocin). Select one.    No   Not selected:    Yes (specify)   In the last year, how many times were you treated with antibiotics for a sinus infection? Select one.    1 to 3 times   Not selected:    None    4 or more times   Have you been diagnosed with a deviated septum or nasal polyps? The nose is divided into two nostrils by the septum. A crooked septum is called a deviated septum. Nasal polyps are growths inside the nose or sinuses. Select one.    No, not that I know of   Not selected:    Yes, but I had surgery to treat them    Yes, I have a deviated septum    Yes, I have nasal polyps    Yes, I have a deviated septum and nasal polyps   Do you have a sore inside your nose that won't heal? Select one.    No, not that I know of   Not selected:    Yes   Do you have allergies (pollen, dust mites, mold, animal dander)? Select one.    No, not that I know of   Not selected:    Yes   Have you had a flu shot this season? Select one.    No   Not selected:    Yes, less than 2 weeks ago    Yes, 2 to 4 weeks ago    Yes, 1 to 3 months ago    Yes, 3 to 6 months ago    Yes, more than 6 months ago   Are you pregnant? Select one.    No   Not selected:    Yes   When was your last menstrual period? If you don't currently have periods or no longer have periods, please briefly explain.    End of August   Within the last 2 weeks,  have you: - Given birth - Had a miscarriage - Had a pregnancy loss - Had an  Being postpartum (live birth or loss) within the last 2 weeks increases your risk of flu complications. Select one.    No   Not selected:    Yes   Are you breastfeeding? Select one.    No   Not selected:    Yes   The flu and COVID-19 can be more serious for people in certain groups. The next few questions help us figure out if you or anyone you live with is at higher risk for complications from these infections. Do any of these statements apply to you? Select all that apply.    None of the above   Not selected:    I'm     I'm     I'm Black    I'm  or    Do you smoke tobacco? Select one.    No   Not selected:    Yes, every day    Yes, some days    No, I quit   Do you have any of these conditions? Select all that apply.    None of the above   Not selected:    Chronic lung disease, such as cystic fibrosis or interstitial fibrosis    Heart disease, such as congenital heart disease, congestive heart failure, or coronary artery disease    Disorder of the brain, spinal cord, or nerves and muscles, such as dementia, cerebral palsy, epilepsy, muscular dystrophy, or developmental delay    Metabolic disorder or mitochondrial disease    Cerebrovascular disease, such as stroke or another condition affecting the blood vessels or blood supply to the brain    Down syndrome    Mood disorder, including depression or schizophrenia spectrum disorders    Substance use disorder, such as alcohol, opioid, or cocaine use disorder    Tuberculosis   Do you live in a group care setting? Examples include: - Nursing home - Residential care - Psychiatric treatment facility - Group home - Dormitory - Board and care home - Homeless shelter - Foster care setting Select one.    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 To determine your BMI, we need to know your weight and height. Please enter your weight (in pounds).    Weight   Please enter your height.    Height   Do you have any of these conditions that can affect the immune system? Scroll to see all options. Select all that apply.    None of these   Not selected:    History of bone marrow transplant    Chronic kidney disease    Chronic liver disease (including cirrhosis)    HIV/AIDS    Inflammatory bowel disease (Crohn's disease or ulcerative colitis)    Lupus    Moderate to severe plaque psoriasis    Multiple sclerosis    Rheumatoid arthritis    Sickle cell anemia    Alpha or beta thalassemia    History of solid organ transplant (kidney, liver, or heart)    History of spleen removal    An autoimmune disorder not listed here (specify)    A condition requiring treatment with long-term use of oral steroids (such as prednisone, prednisolone, or dexamethasone) (specify)   Have you ever been diagnosed with cancer? Select one.    No   Not selected:    Yes, I have cancer now    Yes, but I'm in remission   Do any of these apply to you? Select all that apply.    None of the above   Not selected:    I've been hospitalized within the last 5 days    I have diabetes    I'm in close contact with a child in    The flu and COVID-19 can be more serious for people in certain groups. Do any of these apply to the people who live with you? Select all that apply.    None of the above   Not selected:    Under age 5    Over age 65            Black     or     Pregnant    Has given birth, had a miscarriage, had a pregnancy loss, or had an  in the last 2 weeks   Does any member of your household have any of these medical conditions? Select all that apply.    Diabetes   Not selected:    Asthma    Disorders of the brain, spinal cord, or nerves and muscles,  such as dementia, cerebral palsy, epilepsy, muscular dystrophy, or developmental delay    Chronic lung disease, such as COPD or cystic fibrosis    Heart disease, such as congenital heart disease, congestive heart failure, or coronary artery disease    Cerebrovascular disease, such as stroke or another condition affecting the blood vessels or blood supply to the brain    Blood disorders, such as sickle cell disease    Metabolic disorders such as inherited metabolic disorders or mitochondrial disease    Kidney disorders    Liver disorders    Weakened immune system due to illness or medications such as chemotherapy or steroids    Children under the age of 19 who are on long-term aspirin therapy    Extreme obesity (BMI > 40)    None of the above   Do you have any of these conditions? Scroll to see all options. Select all that apply.    High blood pressure    Thyroid disease   Not selected:    Aspirin triad (also known as Samter's triad or ASA triad)    Asthma or hives from taking aspirin or other NSAIDs, such as ibuprofen or naproxen    Blockage or narrowing of the blood vessels of the heart    Blood clotting disorder    Blood dyscrasia, such anemia, leukemia, lymphoma, or myeloma    Bone marrow depression    Catecholamine-releasing paraganglioma    Congenital long QT syndrome    Depression    Difficulty urinating or completely emptying your bladder    Uncorrected electrolyte abnormalities    Fungal infection    Gastrointestinal (GI) bleeding    Gastrointestinal (GI) obstruction    G6PD deficiency    Recent heart attack    Irregular heartbeat or heart rhythm    Mononucleosis (mono)    Myasthenia gravis    Parkinson's disease    Pheochromocytoma    Reye syndrome    Seizure disorder    Ulcerative colitis    None of the above   Have you ever had either of these conditions? Select all that apply.    No   Not selected:    Metoclopramide-associated dystonic reaction    Tardive dyskinesia   Just a few more questions about  medications, and then you're finished. Have you used any non-prescription medications or nasal sprays for your current symptoms? Examples include saline sprays, decongestants, NyQuil, and Tylenol. Select one.    No   Not selected:    Yes   Have you taken any monoamine oxidase inhibitor (MAOI) medications in the last 14 days? Examples include rasagiline (Azilect), selegiline (Eldepryl, Zelapar), isocarboxazid (Marplan), phenelzine (Nardil), and tranylcypromine (Parnate). Select one.    No, not that I know of   Not selected:    Yes   Do you take Kynmobi or Apokyn (apomorphine)? Select one.    No   Not selected:    Yes   Are you still taking these medications listed in your medical record? If you're not taking any of these, click Next. Select all that apply.    traMADol 50 MG tablet    fluticasone 50 MCG/ACT nasal spray    methylPREDNISolone 4 MG dose pack   Are you taking any other medications, vitamins, or supplements? Select one.    No   Not selected:    Yes   Have you ever had an allergic or bad reaction to any medication? Select one.    No   Not selected:    Yes   Are you allergic to milk or to the proteins found in milk (for example, whey or casein)? A milk allergy is different from lactose intolerance. Select one.    No, not that I know of   Not selected:    Yes   Have you ever had jaundice or liver problems as a result of taking amoxicillin-clavulanate (Augmentin)? Jaundice is a condition in which the skin and the whites of the eyes turn yellow. Select all that apply.    No, not that I know of   Not selected:    Yes, jaundice    Yes, liver problems   Have you ever had jaundice or liver problems as a result of taking azithromycin (Zithromax, Zmax)? Jaundice is a condition in which the skin and the whites of the eyes turn yellow. Select all that apply.    No, not that I know of   Not selected:    Yes, jaundice    Yes, liver problems   Do you need a doctor's note? A doctor's note confirms that you received care  today and states when you can return to school or work. It does not contain information about your diagnosis or treatment plan. Your provider will make the final decision on whether to give you a doctor's note and for how long. Doctor's notes CANNOT be backdated. We can't provide medical leave paperwork through this type of visit. If more paperwork is needed to request time off, contact your primary care provider. Select one.    No   Not selected:    Today only (1 day)    Today and tomorrow (2 days)    3 days    5 days    7 days    10 days    14 days   Is there anything you'd like to add about your symptoms? Please limit your comments to the symptoms asked about in this interview. If you include comments about other concerns, your provider may recommend that you be seen in person.   The patient did not enter any additional information.   ----------   Medical history   Medical history data does not currently exist for this patient.

## 2023-10-05 ENCOUNTER — OFFICE VISIT (OUTPATIENT)
Dept: INTERNAL MEDICINE | Facility: CLINIC | Age: 28
End: 2023-10-05
Payer: COMMERCIAL

## 2023-10-05 VITALS
HEART RATE: 90 BPM | HEIGHT: 64 IN | SYSTOLIC BLOOD PRESSURE: 134 MMHG | WEIGHT: 231.13 LBS | BODY MASS INDEX: 39.46 KG/M2 | TEMPERATURE: 98.4 F | OXYGEN SATURATION: 100 % | DIASTOLIC BLOOD PRESSURE: 88 MMHG

## 2023-10-05 DIAGNOSIS — I10 HYPERTENSION, UNSPECIFIED TYPE: ICD-10-CM

## 2023-10-05 DIAGNOSIS — M79.671 FOOT PAIN, RIGHT: Primary | ICD-10-CM

## 2023-10-05 RX ORDER — METHYLPREDNISOLONE SODIUM SUCCINATE 125 MG/2ML
125 INJECTION, POWDER, LYOPHILIZED, FOR SOLUTION INTRAMUSCULAR; INTRAVENOUS EVERY 6 HOURS
Status: DISCONTINUED | OUTPATIENT
Start: 2023-10-05 | End: 2023-10-05

## 2023-10-05 RX ORDER — METHYLPREDNISOLONE SODIUM SUCCINATE 125 MG/2ML
125 INJECTION, POWDER, LYOPHILIZED, FOR SOLUTION INTRAMUSCULAR; INTRAVENOUS ONCE
Status: COMPLETED | OUTPATIENT
Start: 2023-10-05 | End: 2023-10-05

## 2023-10-05 RX ORDER — KETOROLAC TROMETHAMINE 30 MG/ML
60 INJECTION, SOLUTION INTRAMUSCULAR; INTRAVENOUS ONCE
Status: COMPLETED | OUTPATIENT
Start: 2023-10-05 | End: 2023-10-05

## 2023-10-05 RX ADMIN — KETOROLAC TROMETHAMINE 60 MG: 30 INJECTION, SOLUTION INTRAMUSCULAR; INTRAVENOUS at 09:23

## 2023-10-05 RX ADMIN — METHYLPREDNISOLONE SODIUM SUCCINATE 125 MG: 125 INJECTION, POWDER, LYOPHILIZED, FOR SOLUTION INTRAMUSCULAR; INTRAVENOUS at 09:28

## 2023-10-05 NOTE — PROGRESS NOTES
"Chief Complaint  Foot Pain (Right foot, hurts to put pressure on it. )    Subjective        Dayana Leavitt presents to [facility]  Foot Pain    Dayana Leavitt is a 28 y.o. female who presents for a routine visit at this time.  Patient has tenderness over the dorsal lateral aspect of the right foot where the talus and lateral malleolus of the fibula joint.  I believe this represents a probable tendinitis.  Patient has had similar issues in the past most notably January of last year.  I believe this to be and a tendinitis or inflammation of the of the talofibular ligament.  We will go ahead and give the patient a steroid and Toradol shot to help with this and recheck x-rays at this time previously this did improve dramatically with new shoes and has been a year since patient has replaced the screws and I advised him that this would probably be helpful to do again at this time.  To continue on her anti-inflammatories and may also try placing Voltaren gel over the affected area.  Patient encouraged that if it does not improve with these interventions that we may need to do more advanced imaging.    Hypertension remained stable at this time.    Patient to continue to follow-up with her regular provider Tanya ORDOÑEZ for routine follow-ups.       Objective   Vital Signs:  /88 (BP Location: Left arm, Patient Position: Sitting, Cuff Size: Adult)   Pulse 90   Temp 98.4 °F (36.9 °C) (Skin)   Ht 162.6 cm (64\")   Wt 105 kg (231 lb 2 oz)   SpO2 100%   BMI 39.67 kg/m²   Estimated body mass index is 39.67 kg/m² as calculated from the following:    Height as of this encounter: 162.6 cm (64\").    Weight as of this encounter: 105 kg (231 lb 2 oz).               Physical Exam  Vitals and nursing note reviewed.   Constitutional:       General: She is not in acute distress.     Appearance: Normal appearance.   HENT:      Head: Normocephalic.   Eyes:      Extraocular Movements: Extraocular " movements intact.      Pupils: Pupils are equal, round, and reactive to light.   Cardiovascular:      Rate and Rhythm: Normal rate and regular rhythm.      Heart sounds: Normal heart sounds. No murmur heard.  Pulmonary:      Effort: Pulmonary effort is normal. No respiratory distress.      Breath sounds: Normal breath sounds. No rhonchi or rales.   Abdominal:      General: Abdomen is flat. Bowel sounds are normal.      Palpations: Abdomen is soft.   Musculoskeletal:      Comments: Tenderness over the top of the foot where the talus meets the fibula   Neurological:      General: No focal deficit present.      Mental Status: She is alert.      Result Review :                   Assessment and Plan   Diagnoses and all orders for this visit:    1. Foot pain, right (Primary)  -     XR Foot 3+ View Right (In Office)  -     ketorolac (TORADOL) injection 60 mg  -     methylPREDNISolone sodium succinate (SOLU-Medrol) injection 125 mg    2. Hypertension, unspecified type        EMR Dictation/Transcription disclaimer:   Some of this note may be an electronic transcription/translation of spoken language to printed text. The electronic translation of spoken language may permit erroneous, or at times, nonsensical words or phrases to be inadvertently transcribed; Although I have reviewed the note for such errors, some may still exist.          Follow Up   Return in 4 weeks (on 11/2/2023), or if symptoms worsen or fail to improve.  Patient was given instructions and counseling regarding her condition or for health maintenance advice. Please see specific information pulled into the AVS if appropriate.

## 2023-10-06 ENCOUNTER — TELEPHONE (OUTPATIENT)
Dept: INTERNAL MEDICINE | Facility: CLINIC | Age: 28
End: 2023-10-06

## 2023-10-06 NOTE — TELEPHONE ENCOUNTER
Caller: Dayana Leavitt    Relationship: Self    Best call back number: [phone]     Who are you requesting to speak with (clinical staff, provider,  specific staff member): CLINICAL STAFF    What was the call regarding: PATIENT REQUESTING CALLBACK REGARDING X-RAY RESULTS.

## 2023-11-03 RX ORDER — SEMAGLUTIDE 1.7 MG/.75ML
1.7 INJECTION, SOLUTION SUBCUTANEOUS WEEKLY
Qty: 3 ML | Refills: 1 | Status: SHIPPED | OUTPATIENT
Start: 2023-11-03

## 2023-11-03 NOTE — TELEPHONE ENCOUNTER
Rx Refill Note  Requested Prescriptions     Pending Prescriptions Disp Refills    Wegovy 1.7 MG/0.75ML solution auto-injector [Pharmacy Med Name: WEGOVY 1.7 MG/0.75 ML PEN]  1     Sig: INJECT 0.75 ML UNDER THE SKIN INTO THE APPROPRIATE AREA AS DIRECTED 1 (ONE) TIME PER WEEK.      Last office visit with prescribing clinician: 4/4/2023   Next office visit with prescribing clinician: Visit date not found                         Would you like a call back once the refill request has been completed: [] Yes [] No    If the office needs to give you a call back, can they leave a voicemail: [] Yes [] No    Ana Edwards RN  11/03/23, 12:51 CDT

## 2023-12-14 ENCOUNTER — PATIENT MESSAGE (OUTPATIENT)
Dept: INTERNAL MEDICINE | Facility: CLINIC | Age: 28
End: 2023-12-14

## 2023-12-14 NOTE — TELEPHONE ENCOUNTER
I am working on getting this approved. Haven't heard back yet. She was wondering about upping the dose.

## 2023-12-19 ENCOUNTER — OFFICE VISIT (OUTPATIENT)
Dept: INTERNAL MEDICINE | Facility: CLINIC | Age: 28
End: 2023-12-19
Payer: COMMERCIAL

## 2023-12-19 VITALS
BODY MASS INDEX: 38.41 KG/M2 | OXYGEN SATURATION: 99 % | HEART RATE: 91 BPM | WEIGHT: 225 LBS | DIASTOLIC BLOOD PRESSURE: 85 MMHG | HEIGHT: 64 IN | TEMPERATURE: 98.4 F | RESPIRATION RATE: 16 BRPM | SYSTOLIC BLOOD PRESSURE: 124 MMHG

## 2023-12-19 DIAGNOSIS — E66.9 OBESITY, CLASS II, BMI 35-39.9: Primary | ICD-10-CM

## 2023-12-19 PROCEDURE — 99213 OFFICE O/P EST LOW 20 MIN: CPT | Performed by: NURSE PRACTITIONER

## 2023-12-19 RX ORDER — SEMAGLUTIDE 2.4 MG/.75ML
2.4 INJECTION, SOLUTION SUBCUTANEOUS WEEKLY
Qty: 3 ML | Refills: 1 | Status: SHIPPED | OUTPATIENT
Start: 2023-12-19

## 2023-12-19 RX ORDER — DICLOFENAC SODIUM AND MISOPROSTOL 75; 200 MG/1; UG/1
1 TABLET, DELAYED RELEASE ORAL 2 TIMES DAILY
COMMUNITY

## 2023-12-19 NOTE — PROGRESS NOTES
Subjective     Chief Complaint   Patient presents with    Obesity       History of Present Illness  Pt comes in today to follow up on obesity. She has been on wegovy 1.7 mg for the past 3 months. Her starting weight was 272. Today, she is 225 which is a loss of 47 pounds and about 18% of her body weight. The Wegovy is still decreasing her appetite. She is not having any nausea. Still with some constipation. No abdominal pain. She remains on metformin XR. She was initially diagnosed with pre-diabetes with an A1c of 5.7%. last check ws 4/2023 which was normal. She has been taken off meloxicam and replaced with diclofenac. Her rate of weight loss has slowed. She likely needs the dosage increase to the 2.4mg maintenance dose.     Review of Systems   Otherwise complete ROS reviewed and negative except as mentioned in the HPI.    Past Medical History:   Past Medical History:   Diagnosis Date    ADHD (attention deficit hyperactivity disorder)     Anxiety     Hyperlipidemia     Hypertension     Hypothyroidism      Past Surgical History:No past surgical history on file.  Social History:  reports that she has never smoked. She has never used smokeless tobacco. She reports that she does not drink alcohol and does not use drugs.    Family History: family history includes Depression in her mother; Diabetes in her mother; Heart disease in her maternal grandfather; Hyperlipidemia in her mother; No Known Problems in her father.       Allergies:  No Known Allergies  Medications:  Prior to Admission medications    Medication Sig Start Date End Date Taking? Authorizing Provider   atomoxetine (STRATTERA) 40 MG capsule TAKE 1 CAPSULE BY MOUTH EVERY DAY 5/25/23  Yes Tanya Guthrie APRN   atorvastatin (LIPITOR) 40 MG tablet Take 1 tablet by mouth Daily. 1/12/22  Yes Provider, Historical, MD   diclofenac-miSOPROStol (ARTHROTEC 75) 75-0.2 MG EC tablet Take 1 tablet by mouth 2 (Two) Times a Day.   Yes Provider, Historical,  MD   fluticasone (FLONASE) 50 MCG/ACT nasal spray 2 sprays. 9/22/21  Yes Provider, MD Charo   hydroCHLOROthiazide (HYDRODIURIL) 12.5 MG tablet TAKE 1 TABLET BY MOUTH EVERY DAY 5/26/23  Yes Tanya Guthrie APRN   levothyroxine (SYNTHROID, LEVOTHROID) 50 MCG tablet TAKE 1 TABLET BY MOUTH EVERY DAY 5/26/23  Yes Tanya Guthrie APRN   metFORMIN ER (Glucophage XR) 500 MG 24 hr tablet Take 1 tablet by mouth Daily With Breakfast. 2/7/23  Yes Tanya Guthrie APRN   metFORMIN ER (GLUCOPHAGE-XR) 500 MG 24 hr tablet TAKE 2 TABLETS BY MOUTH DAILY WITH BREAKFAST. 7/3/23  Yes Familia Edwards APRN   metoprolol succinate XL (TOPROL-XL) 100 MG 24 hr tablet TAKE 1 TABLET BY MOUTH EVERY DAY 5/25/23  Yes Tanya Guthrie APRN   Nortrel 7/7/7 0.5/0.75/1-35 MG-MCG per tablet TAKE 1 TABLET BY MOUTH EVERY DAY 5/25/23  Yes Tanya Guthrie APRN   pantoprazole (PROTONIX) 40 MG EC tablet TAKE 1 TABLET BY MOUTH EVERY DAY 5/26/23  Yes Tanya Guthrie APRN   Semaglutide-Weight Management (Wegovy) 1.7 MG/0.75ML solution auto-injector INJECT 0.75 ML UNDER THE SKIN INTO THE APPROPRIATE AREA AS DIRECTED 1 (ONE) TIME PER WEEK. 11/3/23  Yes Tanya Guthrie APRN   sertraline (ZOLOFT) 50 MG tablet TAKE 1 TABLET BY MOUTH EVERY DAY 6/29/23  Yes Tanya Guthrie APRN   spironolactone (ALDACTONE) 25 MG tablet TAKE 1 TABLET BY MOUTH TWICE A DAY 5/25/23  Yes Tanya Guthrie APRN   meloxicam (MOBIC) 15 MG tablet TAKE 1 TABLET BY MOUTH EVERY DAY 5/26/23 12/19/23 Yes Tanya Guthrie APRN   methylPREDNISolone (MEDROL) 4 MG dose pack Take as directed on package instructions.  Patient not taking: Reported on 10/5/2023 4/4/23 12/19/23  Tanya Guthrie APRN   traMADol (ULTRAM) 50 MG tablet Take 1 tablet by mouth Every 6 (Six) Hours As Needed for Moderate Pain .  Patient not taking: Reported on 4/4/2023 4/19/22 12/19/23  Tanya Guthrie APRN       Objective  "    Vital Signs: /85 (BP Location: Right arm, Patient Position: Sitting, Cuff Size: Large Adult)   Pulse 91   Temp 98.4 °F (36.9 °C) (Infrared)   Resp 16   Ht 162.6 cm (64.02\")   Wt 102 kg (225 lb)   SpO2 99%   BMI 38.60 kg/m²   Physical Exam  Vitals reviewed.   Constitutional:       Appearance: She is well-developed. She is obese.   HENT:      Head: Normocephalic and atraumatic.   Eyes:      Pupils: Pupils are equal, round, and reactive to light.   Neck:      Vascular: No JVD.   Cardiovascular:      Rate and Rhythm: Normal rate and regular rhythm.   Pulmonary:      Effort: Pulmonary effort is normal.      Breath sounds: Normal breath sounds.   Abdominal:      General: Bowel sounds are normal.      Palpations: Abdomen is soft.   Musculoskeletal:         General: No deformity.      Cervical back: Normal range of motion and neck supple.   Lymphadenopathy:      Cervical: No cervical adenopathy.   Skin:     General: Skin is warm and dry.   Neurological:      Mental Status: She is alert and oriented to person, place, and time.   Psychiatric:         Behavior: Behavior normal.         Thought Content: Thought content normal.         Judgment: Judgment normal.       Results Reviewed:  Glucose   Date Value Ref Range Status   01/05/2023 101 (H) 70 - 99 mg/dL Final   03/13/2020 82 74 - 109 mg/dL Final     BUN   Date Value Ref Range Status   01/05/2023 16 6 - 20 mg/dL Final   03/13/2020 16 6 - 20 mg/dL Final     Creatinine   Date Value Ref Range Status   01/05/2023 0.81 0.57 - 1.00 mg/dL Final   03/13/2020 0.7 0.5 - 0.9 mg/dL Final     Sodium   Date Value Ref Range Status   01/05/2023 136 134 - 144 mmol/L Final   03/13/2020 139 136 - 145 mmol/L Final     Potassium   Date Value Ref Range Status   01/05/2023 4.5 3.5 - 5.2 mmol/L Final   03/13/2020 4.1 3.5 - 5.0 mmol/L Final     Chloride   Date Value Ref Range Status   01/05/2023 100 96 - 106 mmol/L Final   03/13/2020 100 98 - 111 mmol/L Final     CO2   Date Value " Ref Range Status   03/13/2020 23 22 - 29 mmol/L Final     Total CO2   Date Value Ref Range Status   01/05/2023 22 20 - 29 mmol/L Final     Calcium   Date Value Ref Range Status   01/05/2023 9.2 8.7 - 10.2 mg/dL Final   03/13/2020 9.8 8.6 - 10.0 mg/dL Final     ALT (SGPT)   Date Value Ref Range Status   01/05/2023 19 0 - 32 IU/L Final   03/13/2020 15 5 - 33 U/L Final     AST (SGOT)   Date Value Ref Range Status   01/05/2023 14 0 - 40 IU/L Final   03/13/2020 15 5 - 32 U/L Final     WBC   Date Value Ref Range Status   01/05/2023 7.6 3.4 - 10.8 x10E3/uL Final   03/13/2020 10.2 4.8 - 10.8 K/uL Final     Hematocrit   Date Value Ref Range Status   01/05/2023 38.8 34.0 - 46.6 % Final   03/13/2020 39.3 37.0 - 47.0 % Final     Platelets   Date Value Ref Range Status   01/05/2023 201 150 - 450 x10E3/uL Final   03/13/2020 266 130 - 400 K/uL Final     Triglycerides   Date Value Ref Range Status   01/05/2023 180 (H) 0 - 149 mg/dL Final   07/26/2018 113 0 - 149 mg/dL Final     HDL Cholesterol   Date Value Ref Range Status   01/05/2023 55 >39 mg/dL Final   07/26/2018 66 65 - 121 mg/dL Final     Comment:     VALUES>60 MG/DL ARE ASSOCIATED WITH A DECREASED RISK OF  ATHEROSCLEROTIC CARDIOVASCULAR DISEASE     LDL Cholesterol    Date Value Ref Range Status   07/26/2018 66 <100 mg/dL Final     Comment:     <100 MG/DL=OPITIMAL    100-129 MG/DL=DESIRABLE    130-159 MG/DL BORDERLINE=INCREASED RISK OF ATHEROSCLEROTIC  CARDIOVASCULAR DISEASE    > OR = 160 MG/DL=ASSOCIATED WITH AN INCREASE RISK OF  ATHEROSCLEROTIC CARDIOVASCULAR DISEASE     LDL Chol Calc (NIH)   Date Value Ref Range Status   01/05/2023 85 0 - 99 mg/dL Final     Hemoglobin A1C   Date Value Ref Range Status   04/04/2023 5.4 % Final         Assessment / Plan     Assessment/Plan:  Diagnoses and all orders for this visit:    1. Obesity, Class II, BMI 35-39.9 (Primary)  -     CBC & Differential  -     Comprehensive Metabolic Panel  -     Semaglutide-Weight Management (Wegovy) 2.4  MG/0.75ML solution auto-injector; Inject 2.4 mg under the skin into the appropriate area as directed 1 (One) Time Per Week.  Dispense: 3 mL; Refill: 1      No follow-ups on file. unless patient needs to be seen sooner or acute issues arise.    Code Status: full    I have discussed the patient results/orders and and plan/recommendation with them at today's visit.      Tanya Guthrie, APRN   12/19/2023

## 2023-12-20 LAB
ALBUMIN SERPL-MCNC: 4.3 G/DL (ref 4–5)
ALBUMIN/GLOB SERPL: 1.8 {RATIO} (ref 1.2–2.2)
ALP SERPL-CCNC: 49 IU/L (ref 44–121)
ALT SERPL-CCNC: 13 IU/L (ref 0–32)
AST SERPL-CCNC: 11 IU/L (ref 0–40)
BASOPHILS # BLD AUTO: 0 X10E3/UL (ref 0–0.2)
BASOPHILS NFR BLD AUTO: 1 %
BILIRUB SERPL-MCNC: 0.2 MG/DL (ref 0–1.2)
BUN SERPL-MCNC: 10 MG/DL (ref 6–20)
BUN/CREAT SERPL: 13 (ref 9–23)
CALCIUM SERPL-MCNC: 9.5 MG/DL (ref 8.7–10.2)
CHLORIDE SERPL-SCNC: 102 MMOL/L (ref 96–106)
CO2 SERPL-SCNC: 21 MMOL/L (ref 20–29)
CREAT SERPL-MCNC: 0.8 MG/DL (ref 0.57–1)
EGFRCR SERPLBLD CKD-EPI 2021: 102 ML/MIN/1.73
EOSINOPHIL # BLD AUTO: 0.3 X10E3/UL (ref 0–0.4)
EOSINOPHIL NFR BLD AUTO: 3 %
ERYTHROCYTE [DISTWIDTH] IN BLOOD BY AUTOMATED COUNT: 12.8 % (ref 11.7–15.4)
GLOBULIN SER CALC-MCNC: 2.4 G/DL (ref 1.5–4.5)
GLUCOSE SERPL-MCNC: 89 MG/DL (ref 70–99)
HCT VFR BLD AUTO: 38.8 % (ref 34–46.6)
HGB BLD-MCNC: 12.8 G/DL (ref 11.1–15.9)
IMM GRANULOCYTES # BLD AUTO: 0 X10E3/UL (ref 0–0.1)
IMM GRANULOCYTES NFR BLD AUTO: 0 %
LYMPHOCYTES # BLD AUTO: 2.4 X10E3/UL (ref 0.7–3.1)
LYMPHOCYTES NFR BLD AUTO: 31 %
Lab: NORMAL
MCH RBC QN AUTO: 28.4 PG (ref 26.6–33)
MCHC RBC AUTO-ENTMCNC: 33 G/DL (ref 31.5–35.7)
MCV RBC AUTO: 86 FL (ref 79–97)
MONOCYTES # BLD AUTO: 0.5 X10E3/UL (ref 0.1–0.9)
MONOCYTES NFR BLD AUTO: 6 %
NEUTROPHILS # BLD AUTO: 4.7 X10E3/UL (ref 1.4–7)
NEUTROPHILS NFR BLD AUTO: 59 %
PLATELET # BLD AUTO: 258 X10E3/UL (ref 150–450)
POTASSIUM SERPL-SCNC: 4.9 MMOL/L (ref 3.5–5.2)
PROT SERPL-MCNC: 6.7 G/DL (ref 6–8.5)
RBC # BLD AUTO: 4.5 X10E6/UL (ref 3.77–5.28)
SODIUM SERPL-SCNC: 139 MMOL/L (ref 134–144)
WBC # BLD AUTO: 8 X10E3/UL (ref 3.4–10.8)

## 2023-12-28 DIAGNOSIS — R73.03 PREDIABETES: ICD-10-CM

## 2023-12-28 DIAGNOSIS — F33.1 MODERATE EPISODE OF RECURRENT MAJOR DEPRESSIVE DISORDER: ICD-10-CM

## 2023-12-28 DIAGNOSIS — E66.01 MORBID (SEVERE) OBESITY DUE TO EXCESS CALORIES: ICD-10-CM

## 2023-12-28 RX ORDER — METFORMIN HYDROCHLORIDE 500 MG/1
1000 TABLET, EXTENDED RELEASE ORAL
Qty: 180 TABLET | Refills: 1 | Status: SHIPPED | OUTPATIENT
Start: 2023-12-28

## 2023-12-28 NOTE — TELEPHONE ENCOUNTER
Rx Refill Note  Requested Prescriptions     Pending Prescriptions Disp Refills    sertraline (ZOLOFT) 50 MG tablet [Pharmacy Med Name: SERTRALINE HCL 50 MG TABLET] 90 tablet 1     Sig: TAKE 1 TABLET BY MOUTH EVERY DAY      Last office visit with prescribing clinician: 12/19/2023   Last telemedicine visit with prescribing clinician: Visit date not found   Next office visit with prescribing clinician: 2/20/2024                         Would you like a call back once the refill request has been completed: [] Yes [] No    If the office needs to give you a call back, can they leave a voicemail: [] Yes [] No    Ana Edwards RN  12/28/23, 06:58 CST

## 2023-12-28 NOTE — TELEPHONE ENCOUNTER
Rx Refill Note  Requested Prescriptions     Pending Prescriptions Disp Refills    metFORMIN ER (GLUCOPHAGE-XR) 500 MG 24 hr tablet [Pharmacy Med Name: METFORMIN HCL  MG TABLET] 180 tablet 1     Sig: TAKE 2 TABLETS BY MOUTH DAILY WITH BREAKFAST.      Last office visit with prescribing clinician: 12/19/23   Last telemedicine visit with prescribing clinician: Visit date not found   Next office visit with prescribing clinician: 2/20/24                        Would you like a call back once the refill request has been completed: [] Yes [] No    If the office needs to give you a call back, can they leave a voicemail: [] Yes [] No    Ana Edwards RN  12/28/23, 06:57 CST

## 2024-01-10 ENCOUNTER — E-VISIT (OUTPATIENT)
Dept: FAMILY MEDICINE CLINIC | Facility: TELEHEALTH | Age: 29
End: 2024-01-10

## 2024-01-10 PROCEDURE — BRIGHTMDVISIT: Performed by: PHYSICIAN ASSISTANT

## 2024-01-10 RX ORDER — PENICILLIN V POTASSIUM 500 MG/1
1000 TABLET ORAL 2 TIMES DAILY WITH MEALS
Qty: 40 TABLET | Refills: 0 | Status: SHIPPED | OUTPATIENT
Start: 2024-01-10 | End: 2024-01-20

## 2024-01-10 NOTE — E-VISIT TREATED
Chief Complaint: Cold, flu, COVID, sinus, hay fever, or seasonal allergies   Patient introduction   Patient is 28-year-old female with sore throat and myalgia that started less than 48 hours ago. Regarding date of symptom onset, patient writes: 01/09/24.   COVID-19 testing history, vaccination status, and exposure:    Patient was tested for COVID-19 within the last 24 hours. Test result was negative.    Has not received an updated 8904-6844 COVID-19 vaccination (Pfizer-BioNTech or Moderna vaccine after September 12, 2023; or Novavax vaccine after October 3, 2023).    No known exposure to a person with a confirmed or suspected case of COVID-19.    No high-risk (household) exposure to COVID-19 within the last 14 days.   Risk factors for severe disease from COVID-19 infection    Higher risk for severe disease from COVID-19 because of BMI >= 25.    Hypertension.   Warning. The following may warrant further investigation:    Hypertension.   General presentation   Symptoms came on gradually.   Fever:    No fever.   Sinus and nasal symptoms:    No sinus pain or pressure.    No nasal or sinus congestion.    No nasal discharge.    No itchy nose or sneezing.   Throat symptoms:    Sore throat.    Has had recent strep exposure.    Patient thinks they have strep.    Has discomfort when swallowing but can swallow liquids and solid foods.   Head and body aches:    Myalgia.    No headache.    No sweats.    No chills.    No fatigue.   Cough:    No cough.   Wheezing and shortness of breath:    No COPD diagnosis.    No asthma diagnosis.    No wheezing.    No shortness of breath.   Chest pain:    No chest pain.   Ear symptoms:    Current symptoms include pain in the ear(s).   Dizziness:    No dizziness.   Allergies:    No history of allergies.   Flu exposure:    No recent known exposure to a person with a confirmed flu diagnosis.    Received a flu vaccine in the last 1 to 3 months.   Not taking any over-the-counter medications for  "current symptoms.   Review of red flags/alarm symptoms:    No changes in alertness or awareness.    No symptoms suggesting airway obstruction.    No decreased urination.    No blue or gray coloring present in face, lips, or nail beds.    No swelling, pain, redness, or increased warmth in the calf or lower part of ONE leg only.   Pregnancy/menstrual status/breastfeeding:    Not pregnant.    Not breastfeeding.    Regarding date of last menstrual period, patient writes: End of December .   Self-exam:    Height: 5' 4\"    Weight: 233 lbs    Tonsillar edema.    Purulent tonsils.    Palatal petechiae.    Swollen/painful neck lymph nodes.   Recent antibiotic use:    Has not taken antibiotics for similar symptoms within the past month.   Current medications   Not taking other medications or supplements.   Medication allergies   None.   Medication contraindication review   Patient has history of hypertension and thyroid disease. Therefore, the following medication(s) will not be prescribed:    Metoclopramide.    Acetaminophen-diphenhydramine-phenylephrine.    Pseudoephedrine.    Aspirin-chlorpheniramine-phenylephrine.   No history of metoclopramide-associated dystonic reaction and tardive dyskinesia.   No known history of amoxicillin-clavulanate-associated cholestatic jaundice or hepatic impairment.   No known history of azithromycin-associated cholestatic jaundice or hepatic impairment.   Past medical history   Immune conditions:   No immunocompromising conditions.   No history of cancer.   Social history   Never smoked tobacco.   High-risk household contacts:    Household contact with diabetes.   Patient did not request an excuse note.   Assessment   Strep pharyngitis.   This is the likely diagnosis based on patient's interview responses, including:    Symptom profile    Recent strep exposure   Plan   Medications:   No medications prescribed.   Education:    Condition and causes    Prevention    Treatment and self-care   "  When to call provider   Additional Notes   Patient with known strep exposure with sore throat and exudate without difficulty swallowing. Sent prescription for PCN  2 po BID x 10 days. Precautions given for tonsillar abscess to go to the ED should symptoms develop.   ----------   Electronically signed by Mary Patiño PA-C on 2024-01-10 at 16:34PM   ----------   Patient Interview Transcript:   Which of these symptoms are bothering you? Select all that apply.    Sore throat    Muscle or body aches   Not selected:    Cough    Shortness of breath    Stuffed-up nose or sinuses    Runny nose    Itchy or watery eyes    Itchy nose or sneezing    Loss of smell or taste    Hoarse voice or loss of voice    Headache    Fever    Sweats    Chills    Fatigue or tiredness    Nausea or vomiting    Diarrhea    I don't have any of these symptoms   When did your current symptoms start? Select one.    Less than 48 hours ago   Not selected:    3 to 5 days ago    6 to 9 days ago    10 to 14 days ago    2 to 3 weeks ago    3 to 4 weeks ago    More than a month ago   Do you know the exact date your symptoms started? If so, enter the date as MM/DD/YY. Select one.    Yes (specify): 01/09/24   Not selected:    No   Did your symptoms come on suddenly or gradually? Select one.    Gradually   Not selected:    Suddenly    I'm not sure   Since your current symptoms started, have you been tested for COVID-19? This includes home self-tests as well as nose swab or saliva tests done at a doctor's office, lab, or testing site. Select one.    Yes   Not selected:    No   When was your most recent COVID-19 test? Select one.    Within the last 24 hours   Not selected:    Within the last week (specify date as MM/DD/YY)    7 to 14 days ago    15 to 30 days ago    More than 1 month ago   What was the result of your most recent COVID-19 test? Select one.    Negative   Not selected:    Positive   Has anyone in your household tested positive for COVID-19  "in the past 14 days? Select one.    No   Not selected:    Yes   In the last 14 days, have you had close contact with someone who has COVID-19? \"Close contact\" means any of these: - Caring for someone with COVID-19. - Being within 6 feet of someone with COVID-19 for a total of at least 15 minutes over a 24-hour period. For example, three 5-minute exposures for a total of 15 minutes. - Being in direct contact with respiratory droplets from someone with COVID-19 (being coughed on, kissing, sharing utensils). Select one.    No, not that I know of   Not selected:    Yes, a confirmed case    Yes, a suspected case   Have you gotten the [id number] updated COVID-19 vaccine? This means either the updated Pfizer-BioNTech or Moderna vaccine after September 12, 2023; or the updated Novavax vaccine after October 3, 2023. Select one.    No   Not selected:    Yes   Since your symptoms started, have you felt dizzy? Select one.    No   Not selected:    Yes, but I can still do my regular daily activities    Yes, and it makes it hard to stand, walk, or do daily activities   Do you feel sinus pain or pressure in any of these areas?    No   Not selected:    In my forehead    Around my eyes    Behind my nose    In my cheeks    In my upper teeth or jaw   Can you swallow liquids and solid foods? A sore throat may be painful when swallowing, but it shouldn't prevent you from swallowing. Select one.    Yes, but it's uncomfortable   Not selected:    Yes, with ease    Yes, but it's painful    It's hard to swallow anything because it feels like liquids and food get stuck in my throat    No, I can't swallow anything, liquid or solid foods   Is your throat pain worse on one side than the other? Select one.    No, it's the same on both sides   Not selected:    Yes, it's worse on the right side    Yes, it's worse on the left side   Do you have chest pain? You might also feel it as discomfort, aching, tightness, or squeezing in the chest. Select " one.    No   Not selected:    Yes   Have you urinated at least 3 times in the last 24 hours? Select one.    Yes   Not selected:    No   Do your face, lips, or nail beds appear blue or gray? Select one.    No   Not selected:    Yes   Do you have any swelling, pain, redness, or increased warmth in the calf or lower part of ONE leg only? Select one.    No   Not selected:    Yes   Changes in alertness or awareness may mean you need emergency care. Since your symptoms started, have you had any of these? Select all that apply.    None of the above   Not selected:    Confusion    Slurred speech    Not knowing where you are or what day it is    Difficulty staying conscious    Fainting or passing out   Do your symptoms include a whistling sound, or wheezing, when you breathe? Select one.    No   Not selected:    Yes   Do you have any of these symptoms in your ear(s)? Select all that apply.    Pain   Not selected:    Pressure    Fullness    Crackling or popping    Plugged or blocked sensation    None of the above   Are your tonsils larger than usual?    Yes   Not selected:    No, not that I can tell    I've had my tonsils removed   Is there any white or yellow pus on your tonsils?    Yes   Not selected:    No, not that I can see   Are there red spots on the roof of your mouth or the back of your throat?    Yes   Not selected:    No, not that I can see   Are your glands/lymph nodes swollen, or does it hurt when you touch them?    Yes   Not selected:    No, not that I can tell   In the past 2 weeks, has anyone around you (such as at school, work, or home) had a confirmed diagnosis of strep throat? A confirmed diagnosis means that a throat swab and lab test were done to verify a strep throat infection. Select one.    Yes   Not selected:    No, not that I know of   Do you think you might have strep throat? Select one.    Yes   Not selected:    No   In the past week, has anyone around you (such as at school, work, or home) had a  confirmed diagnosis of the flu? A confirmed diagnosis means that a nose swab was done to verify a flu infection. Select all that apply.    No, not that I know of   Not selected:    I live with someone who has the flu    I've been within touching distance of someone who has the flu    I've walked by, or sat about 3 feet away from, someone who has the flu    I've been in the same building as someone who has the flu   Have you ever been diagnosed with asthma? Select one.    No   Not selected:    Yes   Have you ever been diagnosed with chronic obstructive pulmonary disease (COPD)? Select one.    No, not that I know of   Not selected:    Yes   In the past month, have you taken antibiotics for similar symptoms? Examples of antibiotics include amoxicillin, amoxicillin-clavulanate (Augmentin), penicillin, cefdinir (Omnicef), doxycycline, and clindamycin (Cleocin). Select one.    No   Not selected:    Yes (specify)   Do you have allergies (pollen, dust mites, mold, animal dander)? Select one.    No, not that I know of   Not selected:    Yes   Have you had a flu shot this season? Select one.    Yes, 1 to 3 months ago   Not selected:    Yes, less than 2 weeks ago    Yes, 2 to 4 weeks ago    Yes, 3 to 6 months ago    Yes, more than 6 months ago    No   Are you pregnant? Select one.    No   Not selected:    Yes   When was your last menstrual period? If you don't currently have periods or no longer have periods, please briefly explain.    __End of December __   Within the last 2 weeks, have you: - Given birth - Had a miscarriage - Had a pregnancy loss - Had an  Being postpartum (live birth or loss) within the last 2 weeks increases your risk of flu complications. Select one.    No   Not selected:    Yes   Are you breastfeeding? Select one.    No   Not selected:    Yes   The flu and COVID-19 can be more serious for people in certain groups. The next few questions help us figure out if you or anyone you live with is at  higher risk for complications from these infections. Do any of these statements apply to you? Select all that apply.    None of the above   Not selected:    I'm     I'm     I'm Black    I'm  or    Do you smoke tobacco? Select one.    No   Not selected:    Yes, every day    Yes, some days    No, I quit   Do you have a mostly inactive lifestyle? Answer yes if all of these are true: - You spend at least 6 hours a day sitting or lying down - You get less than 2 and a half hours per week of moderate exercise such as walking fast - You get less than 1 hour and 15 minutes per week of intense exercise such as jogging or running Select one.    No   Not selected:    Yes   Do you have any of these conditions? Select all that apply.    None of the above   Not selected:    Chronic lung disease, such as cystic fibrosis or interstitial fibrosis    Heart disease, such as congenital heart disease, congestive heart failure, or coronary artery disease    Disorder of the brain, spinal cord, or nerves and muscles, such as dementia, cerebral palsy, epilepsy, muscular dystrophy, or developmental delay    Metabolic disorder or mitochondrial disease    Cerebrovascular disease, such as stroke or another condition affecting the blood vessels or blood supply to the brain    Down syndrome    Mood disorder, including depression or schizophrenia spectrum disorders    Substance use disorder, such as alcohol, opioid, or cocaine use disorder    Tuberculosis    Primary immunodeficiency   Do you live in a group care setting? Examples include: - Nursing home - Residential care - Psychiatric treatment facility - Group home - Dormitory - Board and care home - Homeless shelter - Foster care setting Select one.    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 Select one.    Yes   Not selected:    No   People with a very high body mass index (BMI) are at higher risk for developing complications from the  flu and severe illness from COVID-19. To determine your BMI, we need to know your weight and height. Please enter your weight (in pounds).    Weight   Please enter your height.    Height   Do you have any of these conditions that can affect the immune system? Scroll to see all options. Select all that apply.    None of these   Not selected:    History of bone marrow transplant    Chronic kidney disease    Chronic liver disease (including cirrhosis)    HIV/AIDS    Inflammatory bowel disease (Crohn's disease or ulcerative colitis)    Lupus    Moderate to severe plaque psoriasis    Multiple sclerosis    Rheumatoid arthritis    Sickle cell anemia    Alpha or beta thalassemia    History of kidney, liver, heart, or other solid organ transplant    History of liver, heart, or other solid organ transplant    History of spleen removal    An autoimmune disorder not listed here (specify)    A condition requiring treatment with long-term use of oral steroids (such as prednisone, prednisolone, or dexamethasone) (specify)   Have you ever been diagnosed with cancer? Select one.    No   Not selected:    Yes, I have cancer now    Yes, but I'm in remission   Do any of these apply to you? Select all that apply.    None of the above   Not selected:    I've been hospitalized within the last 5 days    I have diabetes    I'm in close contact with a child in    The flu and COVID-19 can be more serious for people in certain groups. Do any of these apply to the people who live with you? Select all that apply.    None of the above   Not selected:    Under age 5    Over age 65            Black     or     Pregnant    Has given birth, had a miscarriage, had a pregnancy loss, or had an  in the last 2 weeks   Does any member of your household have any of these medical conditions? Select all that apply.    Diabetes   Not selected:    Asthma    Disorders of the brain, spinal cord, or nerves and  muscles, such as dementia, cerebral palsy, epilepsy, muscular dystrophy, or developmental delay    Chronic lung disease, such as COPD or cystic fibrosis    Heart disease, such as congenital heart disease, congestive heart failure, or coronary artery disease    Cerebrovascular disease, such as stroke or another condition affecting the blood vessels or blood supply to the brain    Blood disorders, such as sickle cell disease    Metabolic disorders such as inherited metabolic disorders or mitochondrial disease    Kidney disorders    Liver disorders    Weakened immune system due to illness or medications such as chemotherapy or steroids    Children under the age of 19 who are on long-term aspirin therapy    Extreme obesity (BMI > 40)    None of the above   Do you have any of these conditions? Scroll to see all options. Select all that apply.    High blood pressure    Thyroid disease   Not selected:    Aspirin triad (also known as Samter's triad or ASA triad)    Asthma or hives from taking aspirin or other NSAIDs, such as ibuprofen or naproxen    Blockage or narrowing of the blood vessels of the heart    Blood clotting disorder    Blood dyscrasia, such anemia, leukemia, lymphoma, or myeloma    Bone marrow depression    Catecholamine-releasing paraganglioma    Congenital long QT syndrome    Depression    Difficulty urinating or completely emptying your bladder    Uncorrected electrolyte abnormalities    Fungal infection    Gastrointestinal (GI) bleeding    Gastrointestinal (GI) obstruction    G6PD deficiency    Recent heart attack    Irregular heartbeat or heart rhythm    Mononucleosis (mono)    Myasthenia gravis    Parkinson's disease    Pheochromocytoma    Reye syndrome    Seizure disorder    Ulcerative colitis    None of the above   Have you ever had either of these conditions? Select all that apply.    No   Not selected:    Metoclopramide-associated dystonic reaction    Tardive dyskinesia   Just a few more questions  about medications, and then you're finished. Have you used any non-prescription medications or nasal sprays for your current symptoms? Examples include saline sprays, decongestants, NyQuil, and Tylenol. Select one.    No   Not selected:    Yes   Have you taken any monoamine oxidase inhibitor (MAOI) medications in the last 14 days? Examples include rasagiline (Azilect), selegiline (Eldepryl, Zelapar), isocarboxazid (Marplan), phenelzine (Nardil), and tranylcypromine (Parnate). Select one.    No, not that I know of   Not selected:    Yes   Do you take Kynmobi or Apokyn (apomorphine)? Select one.    No   Not selected:    Yes   Are you taking any other medications, vitamins, or supplements? Select one.    No   Not selected:    Yes   Have you ever had an allergic or bad reaction to any medication? Select one.    No   Not selected:    Yes   Are you allergic to milk or to the proteins found in milk (for example, whey or casein)? A milk allergy is different from lactose intolerance. Select one.    No, not that I know of   Not selected:    Yes   Have you ever had jaundice or liver problems as a result of taking amoxicillin-clavulanate (Augmentin)? Jaundice is a condition in which the skin and the whites of the eyes turn yellow. Select all that apply.    No, not that I know of   Not selected:    Yes, jaundice    Yes, liver problems   Have you ever had jaundice or liver problems as a result of taking azithromycin (Zithromax, Zmax)? Jaundice is a condition in which the skin and the whites of the eyes turn yellow. Select all that apply.    No, not that I know of   Not selected:    Yes, jaundice    Yes, liver problems   Do you need a doctor's note? A doctor's note confirms that you received care today and states when you can return to school or work. It does not contain information about your diagnosis or treatment plan. Your provider will make the final decision on whether to give you a doctor's note and for how long. Doctor's  notes CANNOT be backdated. We can't provide medical leave paperwork through this type of visit. If more paperwork is needed to request time off, contact your primary care provider. Select one.    No   Not selected:    Today only (1 day)    Today and tomorrow (2 days)    3 days    5 days    7 days   Is there anything you'd like to add about your symptoms? Please limit your comments to the symptoms asked about in this interview. If you include comments about other concerns, your provider may recommend that you be seen in person.   The patient did not enter any additional information.   ----------   Medical history   The following information was received from the EMR on January 10, 2024.   Allergies:    No Known Allergies   - Allergy Type:   - Reaction:   - Severity: None   - Clinical Status: Active   - Verification Status: Confirmed   Medications:    atorvastatin (LIPITOR) tablet   - Route: Oral   - Start Date: January 21, 2022   - End Date: None   - Status: Active    levothyroxine (SYNTHROID) tablet   - Route:   - Start Date: May 26, 2023   - End Date: None   - Status: Active    diclofenac-miSOPROStol (ARTHOTEC 75) 75 mg-200 mcg tablet   - Route: Oral   - Start Date: December 19, 2023   - End Date: None   - Status: Active    pantoprazole (PROTONIX) EC tablet   - Route:   - Start Date: May 26, 2023   - End Date: None   - Status: Active    WEGOVY 2.4 MG/0.75ML SC SOAJ   - Route: Subcutaneous   - Start Date: December 19, 2023   - End Date: None   - Status: Active    atomoxetine (STRATTERA) capsule   - Route:   - Start Date: May 25, 2023   - End Date: None   - Status: Active    fluticasone (FLONASE) nasal spray   - Route:   - Start Date: January 21, 2022   - End Date: None   - Status: Active    metFORMIN (GLUCOPHAGE-XR) 24 hr tablet   - Route: Oral   - Start Date: February 07, 2023   - End Date: None   - Status: Active    penicillin v potassium (VEETID) tablet   - Route: Oral   - Start Date: January 10, 2024   - End  Date: None   - Status: Active    spironolactone (ALDACTONE) tablet   - Route:   - Start Date: May 25, 2023   - End Date: None   - Status: Active    hydroCHLOROthiazide (HYDRODIURIL) tablet   - Route:   - Start Date: May 26, 2023   - End Date: None   - Status: Active    NORTREL 7/7/7 0.5/0.75/1-35 MG-MCG PO TABS   - Route:   - Start Date: May 25, 2023   - End Date: None   - Status: Active    metFORMIN (GLUCOPHAGE-XR) 24 hr tablet   - Route: Oral   - Start Date: December 28, 2023   - End Date: None   - Status: Active    sertraline (ZOLOFT) tablet   - Route:   - Start Date: December 28, 2023   - End Date: None   - Status: Active    metoprolol succinate (TOPROL-XL) 24 hr tablet   - Route:   - Start Date: May 25, 2023   - End Date: None   - Status: Active   Problem list:    Allergic rhinitis   - Category: Problem List Item   - Health Status:   - Start Date: April 04, 2023   - End Date: None   - Status: Active    Hypertension   - Category: Problem List Item   - Health Status:   - Start Date: April 04, 2023   - End Date: None   - Status: Active    Hypothyroidism   - Category: Problem List Item   - Health Status:   - Start Date: April 04, 2023   - End Date: None   - Status: Active    Mixed hyperlipidemia   - Category: Problem List Item   - Health Status:   - Start Date: April 04, 2023   - End Date: None   - Status: Active

## 2024-01-10 NOTE — EXTERNAL PATIENT INSTRUCTIONS
Note   Prescription for Penicillin VK 500mg two tabs by mouth twice daily was sent to your pharmacy. Should you develop difficulty swallowing, your voice change, or if you have increased pain, go go the Emergency Department for further evaluation. If symptoms do not improve with antibiotics go to the Urgent Care or your PCP for further evaluation.   Diagnosis   Strep pharyngitis (strep throat)   My name is Mary Patiño PA-C, and I'm a healthcare provider at [facility]. I reviewed your interview, and I see that you have strep throat. This can be a painful condition, but it responds well to treatment.   To prevent the spread of illness to others, stay home and away from other people as much as possible while you're sick. When you need to be around other people, consider wearing a face mask.   I've decided to avoid treatment with antibiotics at this time. Antibiotics can cause or worsen symptoms such as nausea, diarrhea, and stomach pain. However, keep a close eye on your symptoms. If you notice symptoms in the When to seek care section, please contact us to adjust your treatment plan.   About your diagnosis   Strep throat is an infection in the throat and tonsils caused by group A streptococcus bacteria. Strep throat is most often spread by droplets that are released into the air after an infected person coughs or sneezes. You can also get strep throat by sharing cups or utensils with an infected person.   Symptoms usually begin within 2 to 5 days after a person has been exposed. The pain from strep throat usually starts quickly and can be severe, especially when swallowing. Body aches and pains are common.   What to expect   If you follow this treatment plan, you should start to feel better within a few days.   When to seek care   Call us at [phone]   with any sudden or unexpected symptoms.    Symptoms that get better for a few days and then suddenly get worse.    Worsening fever.    Your throat pain  "becomes worse and makes swallowing extremely difficult or impossible.    Muffled voice (it may sound like you are talking with a mouthful of food).    Difficulty opening your mouth or jaw.    Stiff neck.    Joint pain, or swelling that moves from joint to joint.    Severe stomach pain.    Shortness of breath.    Nosebleed.    Severe fatigue.    A new rash.    Odd emotional or behavioral changes.    Uncontrollable body movements or \"jerkiness.\"    Severe chest pain.   Other treatment    Rest and drink plenty of water.    Choose throat-friendly liquids and foods, such as lemon tea, broth, applesauce, frozen yogurt, or popsicles.    Gargle with salt water several times a day to help with your throat pain.    Try cough drops and throat lozenges for extra relief.    Stir a teaspoon of honey into warm water or weak tea to help soothe a sore throat.    Avoid smoke and air pollution. Smoke can make infections worse.   Prevention    Avoid close contact with other people when you're sick.    Cover your mouth and nose when you cough or sneeze. Use a tissue or cough into your elbow. Make sure that used tissues go directly into the trash.    Avoid touching your eyes, nose, or mouth while you're sick.    Wash your hands often, especially after coughing, sneezing, or blowing your nose. If soap and water are not available, use an alcohol-based hand .    If you or someone in your home or workplace is sick, disinfect commonly used items. This includes door handles, tables, computers, remotes, and pens.    Coronavirus (COVID-19) information   Common symptoms of COVID-19 include fever, cough, shortness of breath, fatigue, muscle or body aches, headaches, new loss of sense of taste or smell, sore throat, stuffy or runny nose, nausea or vomiting, and diarrhea. Most people who get COVID-19 have mild symptoms and can rest at home until they get better. Elderly people and those with chronic medical problems may be at risk for more " serious complications.   FAQs about the COVID-19 vaccine   Are the vaccines safe?   Yes. Hundreds of millions of people in the [place] have already safely received COVID-19 vaccines under the most intense safety monitoring in the history of the [place].   Do I need the vaccine if I've already had COVID?   Yes. Vaccination helps protect you even if you've already had COVID.   If you had COVID-19 and had symptoms, wait to get vaccinated until you've recovered and completed your isolation period.   If you tested positive for COVID-19 but did not have symptoms, you can get vaccinated after 5 full days have passed since you had a positive test, as long as you don't develop symptoms.   How many doses of the vaccine do I need?   Visit www.cdc.gov/coronavirus/2019-ncov/vaccines/stay-up-to-date.html   to find out how to stay up to date with your COVID-19 vaccines.   I'm immunocompromised. How many doses of the vaccine do I need?   For information on how immunocompromised people can stay up to date with their COVID-19 vaccines, visit www.cdc.gov/coronavirus/2019-ncov/vaccines/recommendations/immuno.html  .   What are the common side effects of the vaccine?   A sore arm, tiredness, headache, and muscle pain may occur within two days of getting the vaccine and last a day or two. For the Moderna or Pfizer vaccines, side effects are more common after the second dose. People over the age of 55 are less likely to have side effects than younger people.   After I'm up to date on vaccines, can I still get or spread COVID?   Yes, you can still get COVID, but your disease should be milder. And your risk of serious illness, hospitalization, and complications will be much lower, especially if you're up to date. Unfortunately, you can still spread COVID if you've been vaccinated. That's why it's important to follow isolation guidelines if you get sick or test positive.   After I'm up to date on vaccines, can I go back to normal?   You should still  wear a mask indoors in public if:    It's required by laws, rules, regulations, or local guidance.    You have a weakened immune system.    Your age puts you at increased risk of severe disease.    You have a medical condition that puts you at increased risk of severe disease.    Someone in your household has a weakened immune system, is at increased risk for severe disease, or is unvaccinated.    You're in an area of high transmission.   Where can I get a COVID-19 vaccine?   Visit [facility]'s website for more information. To find a COVID-19 vaccination site near you, visit www.[employer].gov/  , call [phone]  , or text your zip code to 595873 ([employer]). Message and data rates may apply.   I've had close contact with someone who has COVID. Do I need to quarantine, and if so, for how long?   For the most current answer, including a calculator to determine whether you need to stay home and for how long, visit www.cdc.gov/coronavirus/2019-ncov/your-health/isolation.html  .   I've tested positive for COVID. How long do I need to isolate?   For the latest recommendations, including a calculator to determine how long you need to stay home, visit www.cdc.gov/coronavirus/2019-ncov/your-health/isolation.html  .   What if I develop symptoms that might be from COVID?   For the latest recommendations on what to do if you're sick, including when to seek emergency care, visit www.cdc.gov/coronavirus/2019-ncov/if-you-are-sick/index.html  .    Flu vaccine information   Who should get a flu vaccine?   Everyone 6 months of age and older should get a yearly flu vaccine.   When should I get vaccinated?   You should get a flu vaccine by the end of October. Once you're vaccinated, it takes about two weeks for antibodies to develop and protect you against the flu. That's why it's important to get vaccinated as soon as possible.   After October, is it too late to get vaccinated?   No. You should still get vaccinated. As long as  the flu viruses are still in your community, flu vaccines will remain available, even into January of next year or later.   Why do I need a flu vaccine EVERY year?   Flu viruses are constantly changing, so flu vaccines are usually updated from one season to the next. Your protection from the flu vaccine also lessens over time.   Is the flu vaccine safe?   Yes. Over the last 50 years, hundreds of millions of Americans have safely received the flu vaccines.   What are the side effects of flu vaccines?   You CANNOT get the flu from a flu vaccine. Common side effects of the flu shot include soreness, redness and/or swelling where the shot was given, low grade fever, and aches. Common side effects of the nasal spray flu vaccine for adults include runny nose, headaches, sore throat, and cough. For children, side effects include wheezing, vomiting, muscle aches, and fever.   Does the flu vaccine increase your risk of getting COVID-19?   No. There is no evidence that getting a flu vaccine increases your risk of getting COVID-19.   Is it safe to get the flu vaccine along with a COVID-19 vaccine?   Yes. It's safe to get the flu vaccine with a COVID-19 vaccine or booster.   Contact your healthcare provider TODAY for details on when and where to get your flu vaccine.   Your provider   Your diagnosis was provided by Mary Patiño PA-C, a member of your trusted care team at [facility].   If you have any questions, call us at [phone]  .

## 2024-01-22 NOTE — TELEPHONE ENCOUNTER
VANIA ABERNATHY (Key: [id number])  Rx #: [id number]  Wegovy 1.7MG/0.75ML auto-injectors  Form  [employer] Electronic PA Form (2017 [employer])  Created  1 month ago  Sent to Plan  1 month ago  Plan Response  1 month ago  Submit Clinical Questions  1 month ago  Determination  Unfavorable  1 month ago  eAppeal Submitted  1 month ago  eAppeal Determination  Favorable  1 month ago  Message from Plan  Your PA request has been denied. Additional information will be provided in the denial communication. (Message 1140) Your PA request has been denied. Additional information will be provided in the denial communication. (Message 1140) Your PA Appeal request has been approved. Additional information will be provided in the approval communication. (Message 1235) Your PA Appeal request has been approved. Additional information will be provided in the approval communication. (Message 1235)

## 2024-02-19 ENCOUNTER — E-VISIT (OUTPATIENT)
Dept: FAMILY MEDICINE CLINIC | Facility: TELEHEALTH | Age: 29
End: 2024-02-19
Payer: COMMERCIAL

## 2024-02-19 NOTE — EXTERNAL PATIENT INSTRUCTIONS
View Doctor's Note     Diagnosis   Viral sinusitis   My name is TIAGO Chen, and I'm a healthcare provider at [facility]. From your interview, I see that you have viral sinusitis (sinus infection).   To prevent the spread of illness to others, stay home and away from other people as much as possible while you're sick. When you need to be around other people, consider wearing a face mask.   Here are the main things to know about your current symptoms:    A viral sinusitis infection will get better on its own.    You can use the medications I recommend here to help ease your symptoms.   Based on what you told me in your interview, I haven't prescribed any antibiotics. Antibiotics fight bacteria, not viruses. The latest research shows that 90% of sinus infections are caused by viruses. Antibiotics won't help with your viral infection. They could even make you feel worse as they can cause or worsen nausea, diarrhea, and stomach pain. The following factors suggest that a virus is causing your symptoms:    You've had sinus pressure and other symptoms for less than 5 days, and you don't have a high fever. Bacterial infections tend to come on suddenly and severely and last a long time without improvement.    Your fever is mild. Bacterial infections can cause a high fever.    Your glands/lymph nodes are swollen or tender to the touch. Swollen lymph nodes are common with viral conditions like yours.    In addition to your sore throat, you have other cold symptoms like a stuffy nose or cough. This suggests a viral infection, rather than a bacterial infection such as strep throat.   I've given you a doctor's note for 1 day.   Medications   Your pharmacy   [facility]/pharmacy #[id number] [address] [place] [id number] [phone]     Prescription   Ipratropium bromide nasal spray (0.06%): Spray 2 sprays in each nostril 3 times a day as needed for nasal symptoms.   Benzonatate (200mg): Take 1 capsule by mouth 3 times a day as  needed for cough. Do not chew or cut these capsules.   Non-prescription   Mucus Relief ER oral tablet, extended release (600mg): Take 1 tablet by mouth every 12 hours as needed for cough and congestion.   Fluticasone (50mcg): Spray 2 times in each nostril once daily for nasal symptoms due to allergies or sinusitis. Fluticasone needs to be used every day to be effective. It may take up to a week for the full effects of the medication to be seen. Brands to look for include Flonase.   About your diagnosis   Viral sinusitis is an infection of the sinuses, the hollow spaces connected to your nasal passages. These passages swell and block the drainage of fluid or mucus from the nose, causing pain, pressure, and congestion. These are the key things to know about a viral sinus infection:    It usually starts with cold symptoms.    Some medications can lessen your symptoms.    You can spread it to other people.    The 200 different viruses that cause the common cold can also cause a viral sinus infection.   Common symptoms include fever, sneezing, runny nose, congestion, sinus pain and pressure, sore throat, and cough. You may also notice fatigue, body aches, difficulty sleeping, or decreased appetite.   What to expect   You should feel better within 1 to 2 weeks.   When to seek care   Call us at [phone]   with any sudden or unexpected symptoms.    Fever that measures over 103F or continues for more than 3 days.    A worsening headache.    Your throat pain worsens and makes swallowing extremely difficult or impossible.    Your hoarse voice or loss of voice lasts longer than 2 weeks.    Your sinus pain continues for 10 days or more, without improvement.    More than 5 episodes of diarrhea in a day.    More than 5 episodes of vomiting in a day.    Coughing up red or bloody mucus.    Severe shortness of breath.    Severe stomach pain.    Symptoms that get better for a few days, and then suddenly get worse.    Severe  chest pain   Other treatment    Rest! Your body needs rest to recover and fight infection.    Drink plenty of water to stay hydrated.    Use a clean humidifier or a cool-mist vaporizer in your room at night. Breathing humid air may help you feel better.    Place a warm, moist washcloth over your nose and forehead to help with your sinus pain and pressure.    Try non-prescription saline nasal sprays to help your nasal symptoms.    Try using a Neti Pot to flush out your stuffy nose and sinuses. Neti Pots are available at any drugstore without a prescription.    Gargle with salt water several times a day to help your throat feel better. Cough drops and throat lozenges may provide extra relief. A teaspoon of honey stirred into warm water or weak tea can help soothe a sore throat and cough.    Avoid smoke and air pollution. Smoke can make infections worse.   Prevention    Avoid close contact with other people when you're sick.    Cover your mouth and nose when you cough or sneeze. Use a tissue or cough into your elbow. Make sure that used tissues go directly into the trash.    Avoid touching your eyes, nose, or mouth while you're sick.    Wash your hands often, especially after coughing, sneezing, or blowing your nose. If soap and water are not available, use an alcohol-based hand .    If you or someone in your home or workplace is sick, disinfect commonly used items. This includes door handles, tables, computers, remotes, and pens.    Coronavirus (COVID-19) information   Common symptoms of COVID-19 include fever, cough, shortness of breath, fatigue, muscle or body aches, headaches, new loss of sense of taste or smell, sore throat, stuffy or runny nose, nausea or vomiting, and diarrhea. Most people who get COVID-19 have mild symptoms and can rest at home until they get better. Elderly people and those with chronic medical problems may be at risk for more serious complications.   FAQs about the COVID-19 vaccine    Are the vaccines safe?   Yes. Hundreds of millions of people in the [place] have already safely received COVID-19 vaccines under the most intense safety monitoring in the history of the [place].   Do I need the vaccine if I've already had COVID?   Yes. Vaccination helps protect you even if you've already had COVID.   If you had COVID-19 and had symptoms, wait to get vaccinated until you've recovered and completed your isolation period.   If you tested positive for COVID-19 but did not have symptoms, you can get vaccinated after 5 full days have passed since you had a positive test, as long as you don't develop symptoms.   How many doses of the vaccine do I need?   Visit www.cdc.gov/coronavirus/2019-ncov/vaccines/stay-up-to-date.html   to find out how to stay up to date with your COVID-19 vaccines.   I'm immunocompromised. How many doses of the vaccine do I need?   For information on how immunocompromised people can stay up to date with their COVID-19 vaccines, visit www.cdc.gov/coronavirus/2019-ncov/vaccines/recommendations/immuno.html  .   What are the common side effects of the vaccine?   A sore arm, tiredness, headache, and muscle pain may occur within two days of getting the vaccine and last a day or two. For the Moderna or Pfizer vaccines, side effects are more common after the second dose. People over the age of 55 are less likely to have side effects than younger people.   After I'm up to date on vaccines, can I still get or spread COVID?   Yes, you can still get COVID, but your disease should be milder. And your risk of serious illness, hospitalization, and complications will be much lower, especially if you're up to date. Unfortunately, you can still spread COVID if you've been vaccinated. That's why it's important to follow isolation guidelines if you get sick or test positive.   After I'm up to date on vaccines, can I go back to normal?   You should still wear a mask indoors in public if:    It's required by laws,  rules, regulations, or local guidance.    You have a weakened immune system.    Your age puts you at increased risk of severe disease.    You have a medical condition that puts you at increased risk of severe disease.    Someone in your household has a weakened immune system, is at increased risk for severe disease, or is unvaccinated.    You're in an area of high transmission.   Where can I get a COVID-19 vaccine?   Visit [facility]'s website for more information. To find a COVID-19 vaccination site near you, visit www.[employer].gov/  , call [phone]  , or text your zip code to [phone] ([employer]). Message and data rates may apply.   I've had close contact with someone who has COVID. Do I need to quarantine, and if so, for how long?   For the most current answer, including a calculator to determine whether you need to stay home and for how long, visit www.cdc.gov/coronavirus/2019-ncov/your-health/isolation.html  .   I've tested positive for COVID. How long do I need to isolate?   For the latest recommendations, including a calculator to determine how long you need to stay home, visit www.cdc.gov/coronavirus/2019-ncov/your-health/isolation.html  .   What if I develop symptoms that might be from COVID?   For the latest recommendations on what to do if you're sick, including when to seek emergency care, visit www.cdc.gov/coronavirus/2019-ncov/if-you-are-sick/index.html  .    Flu vaccine information   Who should get a flu vaccine?   Everyone 6 months of age and older should get a yearly flu vaccine.   When should I get vaccinated?   You should get a flu vaccine by the end of October. Once you're vaccinated, it takes about two weeks for antibodies to develop and protect you against the flu. That's why it's important to get vaccinated as soon as possible.   After October, is it too late to get vaccinated?   No. You should still get vaccinated. As long as the flu viruses are still in your community, flu vaccines will  remain available, even into January of next year or later.   Why do I need a flu vaccine EVERY year?   Flu viruses are constantly changing, so flu vaccines are usually updated from one season to the next. Your protection from the flu vaccine also lessens over time.   Is the flu vaccine safe?   Yes. Over the last 50 years, hundreds of millions of Americans have safely received the flu vaccines.   What are the side effects of flu vaccines?   You CANNOT get the flu from a flu vaccine. Common side effects of the flu shot include soreness, redness and/or swelling where the shot was given, low grade fever, and aches. Common side effects of the nasal spray flu vaccine for adults include runny nose, headaches, sore throat, and cough. For children, side effects include wheezing, vomiting, muscle aches, and fever.   Does the flu vaccine increase your risk of getting COVID-19?   No. There is no evidence that getting a flu vaccine increases your risk of getting COVID-19.   Is it safe to get the flu vaccine along with a COVID-19 vaccine?   Yes. It's safe to get the flu vaccine with a COVID-19 vaccine or booster.   Contact your healthcare provider TODAY for details on when and where to get your flu vaccine.   Your provider   Your diagnosis was provided by TIAGO Chen, a member of your trusted care team at [facility].   If you have any questions, call us at [phone]  .   View Doctor's Note     Expires on 03/20/24

## 2024-02-19 NOTE — E-VISIT TREATED
Chief Complaint: Cold, flu, COVID, sinus, hay fever, or seasonal allergies   Patient introduction   Patient is 28-year-old female with cough, congestion, sore throat, voice hoarseness, and fever (which may have resolved; see below) that started less than 48 hours ago.   COVID-19 testing history, vaccination status, and exposure:    Patient was tested for COVID-19 within the last 24 hours. Test result was negative.    Has not received an updated [id number] COVID-19 vaccination (Pfizer-BioNTech or Moderna vaccine after September 12, 2023; or Novavax vaccine after October 3, 2023).    No known exposure to a person with a confirmed or suspected case of COVID-19.    No high-risk (household) exposure to COVID-19 within the last 14 days.   Risk factors for severe disease from COVID-19 infection    Higher risk for severe disease from COVID-19 because of BMI >= 25.    Asthma.    Hypertension.   Warning. The following may warrant further investigation:    Hypertension.   General presentation   Symptoms came on suddenly.   Fever:    Current fever for less than 24 hours.    Highest temperature of 100.4F to 101.5F.   Sinus and nasal symptoms:    Yellow nasal drainage.    Nasal drainage is thick.    Postnasal drip.    Sinus pain or pressure on or around the cheeks and upper teeth or jaw.    Patient first noticed sinus pain less than 5 days ago.    Sinus pain is worse with Valsalva.    No nasal discharge.    No itchy nose or sneezing.    No history of unhealed nasal septal ulcer/nasal wound.    No history of antibiotic treatment for sinus infection in the last year.    No history of deviated septum or nasal polyps.   Throat symptoms:    Sore throat. Has redness in the back of the throat and on the tonsils.    Has discomfort when swallowing but can swallow liquids and solid foods.    Voice is mildly hoarse. Patient does not believe hoarseness is due to voice strain.    No muffled voice.   Head and body aches:    No headache.    " No sweats.    No chills.    No myalgia.    No fatigue.   Cough:    Cough is worse during the day.    Cough is mildly productive of sputum.    Describes color of sputum as yellow.   Wheezing and shortness of breath:    Has asthma diagnosis.    No COPD diagnosis.    No wheezing.    No shortness of breath.    Current cold symptoms do not aggravate their asthma.   Chest pain:    No chest pain.   Ear symptoms:    Current symptoms include pain in the ear(s).   Dizziness:    No dizziness.   Allergies:    Patient has known seasonal allergies.    Patient does not think symptoms are allergy-related.   Flu exposure:    No recent known exposure to a person with a confirmed flu diagnosis.    Received a flu vaccine in the last 3 to 6 months.   Not taking any over-the-counter medications for current symptoms.   Review of red flags/alarm symptoms:    No changes in alertness or awareness.    No paroxysmal cough followed by whoop on inspiration    No symptoms suggesting airway obstruction.    No muffled voice.    No fever above 100.4F lasting longer than 4 days.    No decreased urination.    No blue or gray coloring present in face, lips, or nail beds.    No swelling, pain, redness, or increased warmth in the calf or lower part of ONE leg only.    No proptosis.   Risk factors for antibiotic resistance:    Antibiotic use for similar symptoms within the last 30 days.   Pregnancy/menstrual status/breastfeeding:    Not pregnant.    Not breastfeeding.    Regarding date of last menstrual period, patient writes: End of January .   Self-exam:    Height: 5' 4\"    Weight: 233 lbs    Tonsillar edema.    Tonsils appear normal.    Palatal petechiae.    Able to open mouth normally.    Swollen/painful neck lymph nodes.   Recent antibiotic use:    Has taken antibiotics for similar symptoms within the past month. Patient specifies the antibiotics taken as Penicillin.   Current medications   Currently taking atorvastatin 40 MG tablet, levothyroxine " 50 MCG tablet, diclofenac-miSOPROStol 75-0.2 MG EC tablet, pantoprazole 40 MG EC tablet, Wegovy 2.4 MG/0.75ML solution auto-injector, atomoxetine 40 MG capsule, fluticasone 50 MCG/ACT nasal spray, metFORMIN  MG 24 hr tablet, spironolactone 25 MG tablet, hydroCHLOROthiazide 12.5 MG tablet, Nortrel 7/7/7 0.5/0.75/1-35 MG-MCG per tablet, metFORMIN  MG 24 hr tablet, sertraline 50 MG tablet, and metoprolol succinate  MG 24 hr tablet.   Medication allergies   None.   Medication contraindication review   Patient has history of hypertension and thyroid disease. Therefore, the following medication(s) will not be prescribed:    Metoclopramide.    Acetaminophen-diphenhydramine-phenylephrine.    Pseudoephedrine.    Aspirin-chlorpheniramine-phenylephrine.   No history of metoclopramide-associated dystonic reaction and tardive dyskinesia.   No known history of amoxicillin-clavulanate-associated cholestatic jaundice or hepatic impairment.   No known history of azithromycin-associated cholestatic jaundice or hepatic impairment.   Past medical history   Immune conditions:   No immunocompromising conditions.   No history of cancer.   Social history   Never smoked tobacco.   High-risk household contacts:    Household contact with diabetes.   Patient requests a 1-day excuse note.   Assessment   Viral sinusitis.   This is the likely diagnosis based on patient's interview responses, including:    Symptom profile    Duration of symptoms is shorter than 10 days   Plan   Medications:    ipratropium bromide 42 mcg (0.06 %) nasal spray RX 0.06% 2 sprays nasal tid PRN 4d for nasal symptoms. Amount is 15 mL.    benzonatate 200 mg capsule RX 200mg 1 cap PO tid PRN 7d for cough. Do not chew or cut these capsules. Amount is 21 cap.    Mucus Relief  mg tablet, extended release OTC 600mg 1 tab PO q12h PRN 7d for cough and congestion. Amount is 14 tab.    fluticasone 50 mcg/actuation nasal spray,suspension OTC 50mcg 2 sprays  each nostril nasal qd 30d for nasal symptoms due to allergies or sinusitis. Fluticasone needs to be used every day to be effective. It may take up to a week for the full effects of the medication to be seen. Brands to look for include Flonase. Amount is 16 g.   The patient's prescriptions will be sent to:   [facility]/pharmacy #[id number] [address] [address]   Phone: [phone]     Fax: [phone]   Patient informed to purchase OTC medications.   Other:   Patient was given an excuse note for 1 day.   Education:    Condition and causes    Prevention    Treatment and self-care    When to call provider   ----------   Electronically signed by TIAGO Chen on 2024-02-19 at 08:15AM   ----------   Patient Interview Transcript:   Which of these symptoms are bothering you? Select all that apply.    Cough    Stuffed-up nose or sinuses    Sore throat    Hoarse voice or loss of voice    Fever   Not selected:    Shortness of breath    Runny nose    Itchy or watery eyes    Itchy nose or sneezing    Loss of smell or taste    Headache    Sweats    Chills    Muscle or body aches    Fatigue or tiredness    Nausea or vomiting    Diarrhea    I don't have any of these symptoms   When did your current symptoms start? Select one.    Less than 48 hours ago   Not selected:    3 to 5 days ago    6 to 9 days ago    10 to 14 days ago    2 to 3 weeks ago    3 to 4 weeks ago    More than a month ago   Did your symptoms come on suddenly or gradually? Select one.    Suddenly   Not selected:    Gradually    I'm not sure   Since your current symptoms started, have you been tested for COVID-19? This includes home self-tests as well as nose swab or saliva tests done at a doctor's office, lab, or testing site. Select one.    Yes   Not selected:    No   When was your most recent COVID-19 test? Select one.    Within the last 24 hours   Not selected:    Within the last week (specify date as MM/DD/YY)    7 to 14 days ago    15 to 30 days  "ago    More than 1 month ago   What was the result of your most recent COVID-19 test? Select one.    Negative   Not selected:    Positive   Has anyone in your household tested positive for COVID-19 in the past 14 days? Select one.    No   Not selected:    Yes   In the last 14 days, have you had close contact with someone who has COVID-19? \"Close contact\" means any of these: - Caring for someone with COVID-19. - Being within 6 feet of someone with COVID-19 for a total of at least 15 minutes over a 24-hour period. For example, three 5-minute exposures for a total of 15 minutes. - Being in direct contact with respiratory droplets from someone with COVID-19 (being coughed on, kissing, sharing utensils). Select one.    No, not that I know of   Not selected:    Yes, a confirmed case    Yes, a suspected case   Have you gotten the 2245-1690 updated COVID-19 vaccine? This means either the updated Pfizer-BioNTech or Moderna vaccine after September 12, 2023; or the updated Novavax vaccine after October 3, 2023. Select one.    No   Not selected:    Yes   Since your symptoms started, have you felt dizzy? Select one.    No   Not selected:    Yes, but I can still do my regular daily activities    Yes, and it makes it hard to stand, walk, or do daily activities   Do you have chest pain? You might also feel it as discomfort, aching, tightness, or squeezing in the chest. Select one.    No   Not selected:    Yes   You mentioned having a fever. Do you have a fever now? Select one.    Yes, but I didn't have one when my symptoms started   Not selected:    Yes, and I've had one since my symptoms started    No, it's gone now   Did you take your temperature with a thermometer? Select one.    Yes   Not selected:    No, but it felt mild    No, but it felt high   What was the highest reading on the thermometer? Select one.    100.4 to 101.5F   Not selected:    Below 100.4F    101.6 to 101.9F    102.0 to 103.0F    Above 103.0F   How long have " "you had a fever? Select one.    Less than 24 hours   Not selected:    1 to 3 days    4 or more days   Do you cough so hard that it's made you gag or vomit? By gag, we mean has your coughing made you choke or dry heave? Select all that apply.    No   Not selected:    Yes, my coughing has made me gag    Yes, my coughing has made me vomit   Does your cough come in spasms of 10 to 20 coughs in a row, followed by a loud whoop when breathing in? Select one.    No   Not selected:    Yes   When is your cough the worst? Select all that apply.    During the day   Not selected:    In the morning, or when I wake up    At nighttime, or while I'm sleeping    I haven't noticed a difference depending on time of day   Are you coughing up mucus or phlegm? Select one.    Yes, a little   Not selected:    No, my cough is dry    Yes, a lot   What color is most of the mucus or phlegm that you're coughing up? Select one.    Yellow or yellowish   Not selected:    Clear    White/frothy    Green or greenish    Red or pink    I'm not sure   Do you feel sinus pain or pressure in any of these areas?    In my cheeks    In my upper teeth or jaw   Not selected:    In my forehead    Around my eyes    Behind my nose    No   When did you first notice your sinus pain or pressure? Select one.    Less than 5 days ago   Not selected:    5 to 9 days ago    10 to 14 days ago    2 to 4 weeks ago    1 month ago or longer   Does coughing, sneezing, or leaning forward make your sinuses feel worse? Select one.    Yes   Not selected:    No   What color is your nasal drainage? Select one.    Yellow or yellowish   Not selected:    Clear    White    Green or greenish    My nose is stuffed but not draining or running   Is your nasal drainage thick or thin? Select one.    Thick   Not selected:    Thin   Is there any drainage (mucus) going down the back of your throat? This kind of drainage is also called \"postnasal drip.\" It can cause frequent throat clearing. Select " "one.    Yes   Not selected:    No, not that I know of   Can you swallow liquids and solid foods? A sore throat may be painful when swallowing, but it shouldn't prevent you from swallowing. Select one.    Yes, but it's uncomfortable   Not selected:    Yes, with ease    Yes, but it's painful    It's hard to swallow anything because it feels like liquids and food get stuck in my throat    No, I can't swallow anything, liquid or solid foods   Is your throat pain worse on one side than the other? Select one.    No, it's the same on both sides   Not selected:    Yes, it's worse on the right side    Yes, it's worse on the left side   To recommend the best treatment, we need to see photos of your throat. You can have someone else take the photo, or use a mirror. If you choose not to send photos, you can still continue this interview, but your treatment options may be affected. Select one.    Someone can take the photos for me   Not selected:    I'll take the photos myself    I'd rather not send photos   Send at least 2 photos for review. - Switch the flash to On (not auto). - Have your helper tap to focus on the back of the throat, not the teeth. - Say \"Ah\" so the back of your throat is visible, and have your helper take the photo. - Before sending, make sure the photos are clear and the throat is in focus.    Upload 1   Not selected:    Upload 2    Upload 3   Have you urinated at least 3 times in the last 24 hours? Select one.    Yes   Not selected:    No   Do your face, lips, or nail beds appear blue or gray? Select one.    No   Not selected:    Yes   Do you have any swelling, pain, redness, or increased warmth in the calf or lower part of ONE leg only? Select one.    No   Not selected:    Yes   Changes in alertness or awareness may mean you need emergency care. Since your symptoms started, have you had any of these? Select all that apply.    None of the above   Not selected:    Confusion    Slurred speech    Not knowing " "where you are or what day it is    Difficulty staying conscious    Fainting or passing out   Do your symptoms include a whistling sound, or wheezing, when you breathe? Select one.    No   Not selected:    Yes   Early in this interview, you told us you were hoarse or you'd lost your voice. How would you describe the changes to your voice? Select one.    It just sounds a little raspy   Not selected:    It's harder than usual to talk    I can barely talk at all   Is it possible that you strained your voice? Singing, yelling, or talking more or louder than usual can cause voice strain. Select one.    No, not that I know of   Not selected:    Yes   Since your symptoms started, have you noticed that one or both of your eyes is bulging or poking out? Select one.    No   Not selected:    Yes   Do you have any of these symptoms in your ear(s)? Select all that apply.    Pain   Not selected:    Pressure    Fullness    Crackling or popping    Plugged or blocked sensation    None of the above   Try opening your mouth as wide as you can. Are you able to open your mouth all the way as you normally would? Select one.    Yes   Not selected:    No   Does your voice sound muffled? \"Muffled\" here does not mean hoarse or scratchy. By \"muffled,\" we mean that it sounds like you're speaking with a mouth full of hot food. Select one.    No, not that I can tell   Not selected:    Yes   Are your tonsils larger than usual?    Yes   Not selected:    No, not that I can tell    I've had my tonsils removed   Is there redness in the back of your throat or on your tonsils? Select all that apply.    Yes, in the back of the throat    Yes, on the tonsils   Not selected:    No, not that I can see   Is there any white or yellow pus on your tonsils?    No, not that I can see   Not selected:    Yes   Are there red spots on the roof of your mouth or the back of your throat?    Yes   Not selected:    No, not that I can see   Are your glands/lymph nodes " swollen, or does it hurt when you touch them?    Yes   Not selected:    No, not that I can tell   In the past week, has anyone around you (such as at school, work, or home) had a confirmed diagnosis of the flu? A confirmed diagnosis means that a nose swab was done to verify a flu infection. Select all that apply.    No, not that I know of   Not selected:    I live with someone who has the flu    I've been within touching distance of someone who has the flu    I've walked by, or sat about 3 feet away from, someone who has the flu    I've been in the same building as someone who has the flu   Have you ever been diagnosed with asthma? Select one.    Yes   Not selected:    No   Are your cold symptoms making your asthma worse? Select one.    No   Not selected:    Yes   Are you currently using any medications or inhalers for your asthma? Select one.    No   Not selected:    Yes    I'm supposed to, but I ran out   Have you ever been diagnosed with chronic obstructive pulmonary disease (COPD)? Select one.    No, not that I know of   Not selected:    Yes   In the past month, have you taken antibiotics for similar symptoms? Examples of antibiotics include amoxicillin, amoxicillin-clavulanate (Augmentin), penicillin, cefdinir (Omnicef), doxycycline, and clindamycin (Cleocin). Select one.    Yes (specify): Penicillin   Not selected:    No   In the last year, how many times were you treated with antibiotics for a sinus infection? Select one.    None   Not selected:    1 to 3 times    4 or more times   Do any of these apply to you? Select all that apply.    None of the above   Not selected:    I've been hospitalized within the last 5 days    I have diabetes    I'm in close contact with a child in    Have you been diagnosed with a deviated septum or nasal polyps? The nose is divided into two nostrils by the septum. A crooked septum is called a deviated septum. Nasal polyps are growths inside the nose or sinuses. Select  one.    No, not that I know of   Not selected:    Yes, but I had surgery to treat them    Yes, I have a deviated septum    Yes, I have nasal polyps    Yes, I have a deviated septum and nasal polyps   Do you have a sore inside your nose that won't heal? Select one.    No, not that I know of   Not selected:    Yes   Do you have allergies (pollen, dust mites, mold, animal dander)? Select one.    Yes   Not selected:    No, not that I know of   What kind of allergies do you have? Select all that apply.    Seasonal allergies (hay fever)   Not selected:    Perennial, or year-round, allergies (hay fever)    Pet allergies    Dust allergies    None of the above    I'm not sure   Do you think your symptoms could be allergy-related? Select one.    No   Not selected:    Yes    I'm not sure   Have you had a flu shot this season? Select one.    Yes, 3 to 6 months ago   Not selected:    Yes, less than 2 weeks ago    Yes, 2 to 4 weeks ago    Yes, 1 to 3 months ago    Yes, more than 6 months ago    No   Are you pregnant? Select one.    No   Not selected:    Yes   When was your last menstrual period? If you don't currently have periods or no longer have periods, please briefly explain.    __End of January __   Within the last 2 weeks, have you: - Given birth - Had a miscarriage - Had a pregnancy loss - Had an  Being postpartum (live birth or loss) within the last 2 weeks increases your risk of flu complications. Select one.    No   Not selected:    Yes   Are you breastfeeding? Select one.    No   Not selected:    Yes   The flu and COVID-19 can be more serious for people in certain groups. The next few questions help us figure out if you or anyone you live with is at higher risk for complications from these infections. Do any of these statements apply to you? Select all that apply.    None of the above   Not selected:    I'm     I'm     I'm Black    I'm  or    Do you smoke tobacco?  Select one.    No   Not selected:    Yes, every day    Yes, some days    No, I quit   Do you have a mostly inactive lifestyle? Answer yes if all of these are true: - You spend at least 6 hours a day sitting or lying down - You get less than 2 and a half hours per week of moderate exercise such as walking fast - You get less than 1 hour and 15 minutes per week of intense exercise such as jogging or running Select one.    No   Not selected:    Yes   Do you have any of these conditions? Select all that apply.    None of the above   Not selected:    Chronic lung disease, such as cystic fibrosis or interstitial fibrosis    Heart disease, such as congenital heart disease, congestive heart failure, or coronary artery disease    Disorder of the brain, spinal cord, or nerves and muscles, such as dementia, cerebral palsy, epilepsy, muscular dystrophy, or developmental delay    Metabolic disorder or mitochondrial disease    Cerebrovascular disease, such as stroke or another condition affecting the blood vessels or blood supply to the brain    Down syndrome    Mood disorder, including depression or schizophrenia spectrum disorders    Substance use disorder, such as alcohol, opioid, or cocaine use disorder    Tuberculosis    Primary immunodeficiency   Do you live in a group care setting? Examples include: - Nursing home - Residential care - Psychiatric treatment facility - Group home - [facility] - [facility] and care home - Homeless shelter - Foster care setting Select one.    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 Select one.    Yes   Not selected:    No   People with a very high body mass index (BMI) are at higher risk for developing complications from the flu and severe illness from COVID-19. To determine your BMI, we need to know your weight and height. Please enter your weight (in pounds).    Weight   Please enter your height.    Height   Do you have any of these conditions that can affect the immune system?  Scroll to see all options. Select all that apply.    None of these   Not selected:    History of bone marrow transplant    Chronic kidney disease    Chronic liver disease (including cirrhosis)    HIV/AIDS    Inflammatory bowel disease (Crohn's disease or ulcerative colitis)    Lupus    Moderate to severe plaque psoriasis    Multiple sclerosis    Rheumatoid arthritis    Sickle cell anemia    Alpha or beta thalassemia    History of kidney, liver, heart, or other solid organ transplant    History of liver, heart, or other solid organ transplant    History of spleen removal    An autoimmune disorder not listed here (specify)    A condition requiring treatment with long-term use of oral steroids (such as prednisone, prednisolone, or dexamethasone) (specify)   Have you ever been diagnosed with cancer? Select one.    No   Not selected:    Yes, I have cancer now    Yes, but I'm in remission   The flu and COVID-19 can be more serious for people in certain groups. Do any of these apply to the people who live with you? Select all that apply.    None of the above   Not selected:    Under age 5    Over age 65            Black     or     Pregnant    Has given birth, had a miscarriage, had a pregnancy loss, or had an  in the last 2 weeks   Does any member of your household have any of these medical conditions? Select all that apply.    Diabetes   Not selected:    Asthma    Disorders of the brain, spinal cord, or nerves and muscles, such as dementia, cerebral palsy, epilepsy, muscular dystrophy, or developmental delay    Chronic lung disease, such as COPD or cystic fibrosis    Heart disease, such as congenital heart disease, congestive heart failure, or coronary artery disease    Cerebrovascular disease, such as stroke or another condition affecting the blood vessels or blood supply to the brain    Blood disorders, such as sickle cell disease    Metabolic disorders such as inherited  metabolic disorders or mitochondrial disease    Kidney disorders    Liver disorders    Weakened immune system due to illness or medications such as chemotherapy or steroids    Children under the age of 19 who are on long-term aspirin therapy    Extreme obesity (BMI > 40)    None of the above   Do you have any of these conditions? Scroll to see all options. Select all that apply.    High blood pressure    Thyroid disease   Not selected:    Aspirin triad (also known as Samter's triad or ASA triad)    Asthma or hives from taking aspirin or other NSAIDs, such as ibuprofen or naproxen    Blockage or narrowing of the blood vessels of the heart    Blood clotting disorder    Blood dyscrasia, such anemia, leukemia, lymphoma, or myeloma    Bone marrow depression    Catecholamine-releasing paraganglioma    Congenital long QT syndrome    Depression    Difficulty urinating or completely emptying your bladder    Uncorrected electrolyte abnormalities    Fungal infection    Gastrointestinal (GI) bleeding    Gastrointestinal (GI) obstruction    G6PD deficiency    Recent heart attack    Irregular heartbeat or heart rhythm    Mononucleosis (mono)    Myasthenia gravis    Parkinson's disease    Pheochromocytoma    Reye syndrome    Seizure disorder    Ulcerative colitis    None of the above   Have you ever had either of these conditions? Select all that apply.    No   Not selected:    Metoclopramide-associated dystonic reaction    Tardive dyskinesia   Just a few more questions about medications, and then you're finished. Have you used any non-prescription medications or nasal sprays for your current symptoms? Examples include saline sprays, decongestants, NyQuil, and Tylenol. Select one.    No   Not selected:    Yes   Have you taken any monoamine oxidase inhibitor (MAOI) medications in the last 14 days? Examples include rasagiline (Azilect), selegiline (Eldepryl, Zelapar), isocarboxazid (Marplan), phenelzine (Nardil), and tranylcypromine  (Parnate). Select one.    No, not that I know of   Not selected:    Yes   Do you take Kynmobi or Apokyn (apomorphine)? Select one.    No   Not selected:    Yes   Are you still taking these medications listed in your medical record? If you're not taking any of these, click Next. Select all that apply.    atorvastatin 40 MG tablet    levothyroxine 50 MCG tablet    diclofenac-miSOPROStol 75-0.2 MG EC tablet    pantoprazole 40 MG EC tablet    Wegovy 2.4 MG/0.75ML solution auto-injector    atomoxetine 40 MG capsule    fluticasone 50 MCG/ACT nasal spray    metFORMIN  MG 24 hr tablet    spironolactone 25 MG tablet    hydroCHLOROthiazide 12.5 MG tablet    Nortrel 7/7/7 0.5/0.75/1-35 MG-MCG per tablet    metFORMIN  MG 24 hr tablet    sertraline 50 MG tablet    metoprolol succinate  MG 24 hr tablet   Are you taking any other medications, vitamins, or supplements? Select one.    No   Not selected:    Yes   Have you ever had an allergic or bad reaction to any medication? Select one.    No   Not selected:    Yes   Are you allergic to milk or to the proteins found in milk (for example, whey or casein)? A milk allergy is different from lactose intolerance. Select one.    No, not that I know of   Not selected:    Yes   Have you ever had jaundice or liver problems as a result of taking amoxicillin-clavulanate (Augmentin)? Jaundice is a condition in which the skin and the whites of the eyes turn yellow. Select all that apply.    No, not that I know of   Not selected:    Yes, jaundice    Yes, liver problems   Have you ever had jaundice or liver problems as a result of taking azithromycin (Zithromax, Zmax)? Jaundice is a condition in which the skin and the whites of the eyes turn yellow. Select all that apply.    No, not that I know of   Not selected:    Yes, jaundice    Yes, liver problems   Do you need a doctor's note? A doctor's note confirms that you received care today and states when you can return to school or  work. It does not contain information about your diagnosis or treatment plan. Your provider will make the final decision on whether to give you a doctor's note and for how long. Doctor's notes CANNOT be backdated. We can't provide medical leave paperwork through this type of visit. If more paperwork is needed to request time off, contact your primary care provider. Select one.    Today only (1 day)   Not selected:    Today and tomorrow (2 days)    3 days    5 days    7 days    No   Is there anything you'd like to add about your symptoms? Please limit your comments to the symptoms covered in this interview. If you include comments about other concerns, your provider may recommend that you be seen in person.   The patient did not enter any additional information.   ----------   Medical history   Medical history data does not currently exist for this patient.

## 2024-02-22 RX ORDER — METHYLPREDNISOLONE 4 MG/1
TABLET ORAL
Qty: 21 TABLET | Refills: 0 | Status: SHIPPED | OUTPATIENT
Start: 2024-02-22

## 2024-02-22 RX ORDER — AMOXICILLIN AND CLAVULANATE POTASSIUM 875; 125 MG/1; MG/1
1 TABLET, FILM COATED ORAL 2 TIMES DAILY
Qty: 14 TABLET | Refills: 0 | Status: SHIPPED | OUTPATIENT
Start: 2024-02-22 | End: 2024-02-23 | Stop reason: SDUPTHER

## 2024-02-23 ENCOUNTER — TELEPHONE (OUTPATIENT)
Dept: INTERNAL MEDICINE | Facility: CLINIC | Age: 29
End: 2024-02-23
Payer: COMMERCIAL

## 2024-02-23 RX ORDER — AMOXICILLIN AND CLAVULANATE POTASSIUM 875; 125 MG/1; MG/1
1 TABLET, FILM COATED ORAL 2 TIMES DAILY
Qty: 20 TABLET | Refills: 0 | Status: SHIPPED | OUTPATIENT
Start: 2024-02-23

## 2024-02-23 NOTE — TELEPHONE ENCOUNTER
GATO FROM [facility] CALLED SAID PT RECEIVED 20 PILLS (THEY COME 20 TO A BOTTLE)  CAN YOU PLEASE RE-WRITE THE PRESCRIPTION FOR 20?  amoxicillin-clavulanate (AUGMENTIN) 875-125 MG per tablet

## 2024-03-08 ENCOUNTER — OFFICE VISIT (OUTPATIENT)
Dept: INTERNAL MEDICINE | Facility: CLINIC | Age: 29
End: 2024-03-08
Payer: COMMERCIAL

## 2024-03-08 VITALS
HEART RATE: 76 BPM | HEIGHT: 64 IN | OXYGEN SATURATION: 99 % | TEMPERATURE: 98 F | RESPIRATION RATE: 18 BRPM | BODY MASS INDEX: 37.73 KG/M2 | WEIGHT: 221 LBS

## 2024-03-08 DIAGNOSIS — L03.012 PARONYCHIA OF FINGER OF LEFT HAND: ICD-10-CM

## 2024-03-08 DIAGNOSIS — E66.9 OBESITY, CLASS II, BMI 35-39.9: Primary | ICD-10-CM

## 2024-03-08 PROCEDURE — 99213 OFFICE O/P EST LOW 20 MIN: CPT | Performed by: NURSE PRACTITIONER

## 2024-03-08 RX ORDER — SEMAGLUTIDE 2.4 MG/.75ML
2.4 INJECTION, SOLUTION SUBCUTANEOUS WEEKLY
Qty: 3 ML | Refills: 6 | Status: SHIPPED | OUTPATIENT
Start: 2024-03-08

## 2024-03-08 NOTE — PROGRESS NOTES
Subjective     Chief Complaint   Patient presents with    Obesity       Obesity  Pertinent negatives include no abdominal pain, arthralgias, chest pain, chills, coughing, fever, headaches, joint swelling, myalgias, nausea, rash, vomiting or weakness.     Patient presents today for follow up on obesity. She states she has lost 50 lbs. She has been taking wegovy since last march. She has been on the 2.5 since last December. She denies any side effects to wegovy. She is doing well on it.     She states she had an infected cuticle on her left middle finger that won't heal up. She has been putting bacitracin and bactroban for 2-3 days and she has seen mild improvement. She states it hurts. She has gotten green discharge out of it but she hasn't had any since Monday or Tuesday.     She also states she has right ear pain. It started last night.       Review of Systems   Constitutional:  Negative for activity change, appetite change, chills and fever.   HENT:  Positive for ear pain (right). Negative for hearing loss, nosebleeds, tinnitus and trouble swallowing.    Eyes:  Negative for visual disturbance.   Respiratory:  Negative for cough, chest tightness, shortness of breath and wheezing.    Cardiovascular:  Negative for chest pain, palpitations and leg swelling.   Gastrointestinal:  Negative for abdominal distention, abdominal pain, blood in stool, constipation, diarrhea, nausea and vomiting.   Endocrine: Negative for cold intolerance, heat intolerance, polydipsia, polyphagia and polyuria.   Genitourinary:  Negative for decreased urine volume, difficulty urinating, dysuria, flank pain, frequency and hematuria.   Musculoskeletal:  Negative for arthralgias, joint swelling and myalgias.   Skin:  Negative for rash.        Left middle finger cuticle, green discharge   Allergic/Immunologic: Negative for immunocompromised state.   Neurological:  Negative for dizziness, syncope, weakness, light-headedness and headaches.    Hematological:  Negative for adenopathy. Does not bruise/bleed easily.   Psychiatric/Behavioral:  Negative for confusion and sleep disturbance. The patient is not nervous/anxious.         Otherwise complete ROS reviewed and negative except as mentioned in the HPI.    Past Medical History:   Past Medical History:   Diagnosis Date    ADHD (attention deficit hyperactivity disorder)     Anxiety     Hyperlipidemia     Hypertension     Hypothyroidism      Past Surgical History:History reviewed. No pertinent surgical history.  Social History:  reports that she has never smoked. She has never used smokeless tobacco. She reports that she does not drink alcohol and does not use drugs.    Family History: family history includes Depression in her mother; Diabetes in her mother; Heart disease in her maternal grandfather; Hyperlipidemia in her mother; No Known Problems in her father.       Allergies:  No Known Allergies  Medications:  Prior to Admission medications    Medication Sig Start Date End Date Taking? Authorizing Provider   atomoxetine (STRATTERA) 40 MG capsule TAKE 1 CAPSULE BY MOUTH EVERY DAY 5/25/23  Yes Tanya Guthrie APRN   atorvastatin (LIPITOR) 40 MG tablet Take 1 tablet by mouth Daily. 1/12/22  Yes Charo Alcaraz MD   diclofenac-miSOPROStol (ARTHROTEC 75) 75-0.2 MG EC tablet Take 1 tablet by mouth 2 (Two) Times a Day.   Yes ProviderCharo MD   fluticasone (FLONASE) 50 MCG/ACT nasal spray 2 sprays. 9/22/21  Yes Charo Alcaraz MD   hydroCHLOROthiazide (HYDRODIURIL) 12.5 MG tablet TAKE 1 TABLET BY MOUTH EVERY DAY 5/26/23  Yes Tanya Guthrie APRN   levothyroxine (SYNTHROID, LEVOTHROID) 50 MCG tablet TAKE 1 TABLET BY MOUTH EVERY DAY 5/26/23  Yes Tanya Guthrie APRN   metFORMIN ER (GLUCOPHAGE-XR) 500 MG 24 hr tablet TAKE 2 TABLETS BY MOUTH DAILY WITH BREAKFAST. 12/28/23  Yes Tanya Guthrie APRN   metoprolol succinate XL (TOPROL-XL) 100 MG 24 hr tablet TAKE  "1 TABLET BY MOUTH EVERY DAY 5/25/23  Yes Tanya Guthrie APRN   Nortrel 7/7/7 0.5/0.75/1-35 MG-MCG per tablet TAKE 1 TABLET BY MOUTH EVERY DAY 5/25/23  Yes Tanya Guthrie APRN   pantoprazole (PROTONIX) 40 MG EC tablet TAKE 1 TABLET BY MOUTH EVERY DAY 5/26/23  Yes Tanya Guthrie APRN   Semaglutide-Weight Management (Wegovy) 2.4 MG/0.75ML solution auto-injector Inject 2.4 mg under the skin into the appropriate area as directed 1 (One) Time Per Week. 12/19/23  Yes Tanya Guthrie APRN   sertraline (ZOLOFT) 50 MG tablet TAKE 1 TABLET BY MOUTH EVERY DAY 12/28/23  Yes Tanya Guthrie APRN   spironolactone (ALDACTONE) 25 MG tablet TAKE 1 TABLET BY MOUTH TWICE A DAY 5/25/23  Yes Tanya Guthrie APRN   amoxicillin-clavulanate (AUGMENTIN) 875-125 MG per tablet Take 1 tablet by mouth 2 (Two) Times a Day. 2/23/24   Tanya Guthrie APRN   metFORMIN ER (Glucophage XR) 500 MG 24 hr tablet Take 1 tablet by mouth Daily With Breakfast. 2/7/23   Tanya Guthrie APRN   methylPREDNISolone (MEDROL) 4 MG dose pack Take as directed on package instructions. 2/22/24   Tanya Guthrie APRN       Objective     Vital Signs: Pulse 76   Temp 98 °F (36.7 °C)   Resp 18   Ht 162.6 cm (64.02\")   Wt 100 kg (221 lb)   SpO2 99%   BMI 37.91 kg/m²   Physical Exam  Vitals reviewed.   Constitutional:       Appearance: She is well-developed.   HENT:      Head: Normocephalic and atraumatic.      Right Ear: Tympanic membrane normal.      Left Ear: Tympanic membrane normal.   Eyes:      Pupils: Pupils are equal, round, and reactive to light.   Neck:      Vascular: No JVD.   Cardiovascular:      Rate and Rhythm: Normal rate and regular rhythm.   Pulmonary:      Effort: Pulmonary effort is normal.   Abdominal:      General: Bowel sounds are normal.      Palpations: Abdomen is soft.   Musculoskeletal:         General: No deformity.      Cervical back: Normal range of motion " and neck supple.   Lymphadenopathy:      Cervical: No cervical adenopathy.   Skin:     General: Skin is warm and dry.      Comments: Left middle finger erythema around nail   Neurological:      Mental Status: She is alert and oriented to person, place, and time.   Psychiatric:         Behavior: Behavior normal.         Thought Content: Thought content normal.         Judgment: Judgment normal.       Results Reviewed:  Glucose   Date Value Ref Range Status   12/18/2023 89 70 - 99 mg/dL Final   03/13/2020 82 74 - 109 mg/dL Final     BUN   Date Value Ref Range Status   12/18/2023 10 6 - 20 mg/dL Final   03/13/2020 16 6 - 20 mg/dL Final     Creatinine   Date Value Ref Range Status   12/18/2023 0.80 0.57 - 1.00 mg/dL Final   03/13/2020 0.7 0.5 - 0.9 mg/dL Final     Sodium   Date Value Ref Range Status   12/18/2023 139 134 - 144 mmol/L Final   03/13/2020 139 136 - 145 mmol/L Final     Potassium   Date Value Ref Range Status   12/18/2023 4.9 3.5 - 5.2 mmol/L Final   03/13/2020 4.1 3.5 - 5.0 mmol/L Final     Chloride   Date Value Ref Range Status   12/18/2023 102 96 - 106 mmol/L Final   03/13/2020 100 98 - 111 mmol/L Final     CO2   Date Value Ref Range Status   03/13/2020 23 22 - 29 mmol/L Final     Total CO2   Date Value Ref Range Status   12/18/2023 21 20 - 29 mmol/L Final     Calcium   Date Value Ref Range Status   12/18/2023 9.5 8.7 - 10.2 mg/dL Final   03/13/2020 9.8 8.6 - 10.0 mg/dL Final     ALT (SGPT)   Date Value Ref Range Status   12/18/2023 13 0 - 32 IU/L Final   03/13/2020 15 5 - 33 U/L Final     AST (SGOT)   Date Value Ref Range Status   12/18/2023 11 0 - 40 IU/L Final   03/13/2020 15 5 - 32 U/L Final     WBC   Date Value Ref Range Status   12/18/2023 8.0 3.4 - 10.8 x10E3/uL Final   03/13/2020 10.2 4.8 - 10.8 K/uL Final     Hematocrit   Date Value Ref Range Status   12/18/2023 38.8 34.0 - 46.6 % Final   03/13/2020 39.3 37.0 - 47.0 % Final     Platelets   Date Value Ref Range Status   12/18/2023 258 150 - 450  x10E3/uL Final   03/13/2020 266 130 - 400 K/uL Final     Triglycerides   Date Value Ref Range Status   01/05/2023 180 (H) 0 - 149 mg/dL Final   07/26/2018 113 0 - 149 mg/dL Final     HDL Cholesterol   Date Value Ref Range Status   01/05/2023 55 >39 mg/dL Final   07/26/2018 66 65 - 121 mg/dL Final     Comment:     VALUES>60 MG/DL ARE ASSOCIATED WITH A DECREASED RISK OF  ATHEROSCLEROTIC CARDIOVASCULAR DISEASE     LDL Cholesterol    Date Value Ref Range Status   07/26/2018 66 <100 mg/dL Final     Comment:     <100 MG/DL=OPITIMAL    100-129 MG/DL=DESIRABLE    130-159 MG/DL BORDERLINE=INCREASED RISK OF ATHEROSCLEROTIC  CARDIOVASCULAR DISEASE    > OR = 160 MG/DL=ASSOCIATED WITH AN INCREASE RISK OF  ATHEROSCLEROTIC CARDIOVASCULAR DISEASE     LDL Chol Calc (NIH)   Date Value Ref Range Status   01/05/2023 85 0 - 99 mg/dL Final     Hemoglobin A1C   Date Value Ref Range Status   04/04/2023 5.4 % Final         Assessment / Plan     Assessment/Plan:  Diagnoses and all orders for this visit:    1. Obesity, Class II, BMI 35-39.9 (Primary)  -     Semaglutide-Weight Management (Wegovy) 2.4 MG/0.75ML solution auto-injector; Inject 2.4 mg under the skin into the appropriate area as directed 1 (One) Time Per Week.  Dispense: 3 mL; Refill: 6    2. Paronychia of finger of left hand  -     mupirocin (BACTROBAN) 2 % ointment; Apply 1 Application topically to the appropriate area as directed 2 (Two) Times a Day.  Dispense: 22 g; Refill: 1      Return in about 3 months (around 6/8/2024) for Annual physical with pap. unless patient needs to be seen sooner or acute issues arise.    Code Status: Full.    I have discussed the patient results/orders and and plan/recommendation with them at today's visit.      Signed by:    TIAGO Clarke Date: 03/08/24

## 2024-03-19 RX ORDER — ALBUTEROL SULFATE 90 UG/1
2 AEROSOL, METERED RESPIRATORY (INHALATION) EVERY 4 HOURS PRN
Qty: 18 G | Refills: 1 | Status: SHIPPED | OUTPATIENT
Start: 2024-03-19

## 2024-03-19 RX ORDER — METHYLPREDNISOLONE 4 MG/1
TABLET ORAL
Qty: 21 TABLET | Refills: 0 | Status: SHIPPED | OUTPATIENT
Start: 2024-03-19

## 2024-03-19 RX ORDER — AMOXICILLIN AND CLAVULANATE POTASSIUM 875; 125 MG/1; MG/1
1 TABLET, FILM COATED ORAL 2 TIMES DAILY
Qty: 20 TABLET | Refills: 0 | Status: SHIPPED | OUTPATIENT
Start: 2024-03-19

## 2024-04-19 RX ORDER — NORETHINDRONE AND ETHINYL ESTRADIOL 7 DAYS X 3
KIT ORAL
Qty: 84 TABLET | Refills: 3 | Status: SHIPPED | OUTPATIENT
Start: 2024-04-19

## 2024-05-28 RX ORDER — METOPROLOL SUCCINATE 100 MG/1
TABLET, EXTENDED RELEASE ORAL
Qty: 90 TABLET | Refills: 3 | Status: SHIPPED | OUTPATIENT
Start: 2024-05-28

## 2024-06-24 DIAGNOSIS — E03.9 HYPOTHYROIDISM, UNSPECIFIED: ICD-10-CM

## 2024-06-24 DIAGNOSIS — F33.1 MODERATE EPISODE OF RECURRENT MAJOR DEPRESSIVE DISORDER: ICD-10-CM

## 2024-06-25 DIAGNOSIS — E66.01 MORBID (SEVERE) OBESITY DUE TO EXCESS CALORIES: ICD-10-CM

## 2024-06-25 DIAGNOSIS — R73.03 PREDIABETES: ICD-10-CM

## 2024-06-25 DIAGNOSIS — L68.0 HIRSUTISM: ICD-10-CM

## 2024-06-25 DIAGNOSIS — I10 ESSENTIAL (PRIMARY) HYPERTENSION: ICD-10-CM

## 2024-06-25 RX ORDER — HYDROCHLOROTHIAZIDE 12.5 MG/1
TABLET ORAL
Qty: 90 TABLET | Refills: 3 | Status: SHIPPED | OUTPATIENT
Start: 2024-06-25

## 2024-06-25 RX ORDER — LEVOTHYROXINE SODIUM 0.05 MG/1
TABLET ORAL
Qty: 90 TABLET | Refills: 3 | Status: SHIPPED | OUTPATIENT
Start: 2024-06-25

## 2024-06-25 RX ORDER — METFORMIN HYDROCHLORIDE 500 MG/1
1000 TABLET, EXTENDED RELEASE ORAL
Qty: 180 TABLET | Refills: 1 | Status: SHIPPED | OUTPATIENT
Start: 2024-06-25

## 2024-06-25 RX ORDER — SPIRONOLACTONE 25 MG/1
TABLET ORAL
Qty: 180 TABLET | Refills: 3 | Status: SHIPPED | OUTPATIENT
Start: 2024-06-25

## 2024-06-25 NOTE — TELEPHONE ENCOUNTER
Rx Refill Note  Requested Prescriptions     Pending Prescriptions Disp Refills    sertraline (ZOLOFT) 50 MG tablet [Pharmacy Med Name: SERTRALINE HCL 50 MG TABLET] 90 tablet 1     Sig: TAKE 1 TABLET BY MOUTH EVERY DAY    levothyroxine (SYNTHROID, LEVOTHROID) 50 MCG tablet [Pharmacy Med Name: LEVOTHYROXINE 50 MCG TABLET] 90 tablet 3     Sig: TAKE 1 TABLET BY MOUTH EVERY DAY      Last office visit with prescribing clinician: 3/8/2024   Last telemedicine visit with prescribing clinician: Visit date not found   Next office visit with prescribing clinician: 6/25/2024                         Would you like a call back once the refill request has been completed: [] Yes [] No    If the office needs to give you a call back, can they leave a voicemail: [] Yes [] No    Ana Edwards RN  06/25/24, 07:13 CDT

## 2024-06-25 NOTE — TELEPHONE ENCOUNTER
Rx Refill Note  Requested Prescriptions     Pending Prescriptions Disp Refills    spironolactone (ALDACTONE) 25 MG tablet [Pharmacy Med Name: SPIRONOLACTONE 25 MG TABLET] 180 tablet 3     Sig: TAKE 1 TABLET BY MOUTH TWICE A DAY    hydroCHLOROthiazide 12.5 MG tablet [Pharmacy Med Name: HYDROCHLOROTHIAZIDE 12.5 MG TB] 90 tablet 3     Sig: TAKE 1 TABLET BY MOUTH EVERY DAY    metFORMIN ER (GLUCOPHAGE-XR) 500 MG 24 hr tablet [Pharmacy Med Name: METFORMIN HCL  MG TABLET] 180 tablet 1     Sig: TAKE 2 TABLETS BY MOUTH DAILY WITH BREAKFAST.      Last office visit with prescribing clinician: 3/8/2024   Last telemedicine visit with prescribing clinician: Visit date not found   Next office visit with prescribing clinician: 6/27/2024                         Would you like a call back once the refill request has been completed: [] Yes [] No    If the office needs to give you a call back, can they leave a voicemail: [] Yes [] No    Ana Edwards RN  06/25/24, 07:12 CDT

## 2024-06-26 DIAGNOSIS — R10.13 EPIGASTRIC PAIN: ICD-10-CM

## 2024-06-26 RX ORDER — PANTOPRAZOLE SODIUM 40 MG/1
TABLET, DELAYED RELEASE ORAL
Qty: 90 TABLET | Refills: 3 | Status: SHIPPED | OUTPATIENT
Start: 2024-06-26

## 2024-06-26 NOTE — TELEPHONE ENCOUNTER
Rx Refill Note  Requested Prescriptions     Pending Prescriptions Disp Refills    pantoprazole (PROTONIX) 40 MG EC tablet [Pharmacy Med Name: PANTOPRAZOLE SOD DR 40 MG TAB] 90 tablet 3     Sig: TAKE 1 TABLET BY MOUTH EVERY DAY      Last office visit with prescribing clinician: 3/8/2024   Last telemedicine visit with prescribing clinician: Visit date not found   Next office visit with prescribing clinician: 7/23/2024                         Would you like a call back once the refill request has been completed: [] Yes [] No    If the office needs to give you a call back, can they leave a voicemail: [] Yes [] No    Ana Edwards RN  06/26/24, 07:11 CDT

## 2024-07-02 DIAGNOSIS — F98.8 OTHER SPECIFIED BEHAVIORAL AND EMOTIONAL DISORDERS WITH ONSET USUALLY OCCURRING IN CHILDHOOD AND ADOLESCENCE: ICD-10-CM

## 2024-07-02 RX ORDER — ATOMOXETINE 40 MG/1
CAPSULE ORAL
Qty: 90 CAPSULE | Refills: 3 | Status: SHIPPED | OUTPATIENT
Start: 2024-07-02

## 2024-07-02 NOTE — TELEPHONE ENCOUNTER
Rx Refill Note  Requested Prescriptions     Pending Prescriptions Disp Refills    atomoxetine (STRATTERA) 40 MG capsule [Pharmacy Med Name: ATOMOXETINE HCL 40 MG CAPSULE] 90 capsule 3     Sig: TAKE 1 CAPSULE BY MOUTH EVERY DAY      Last office visit with prescribing clinician: 3/8/2024   Last telemedicine visit with prescribing clinician: Visit date not found   Next office visit with prescribing clinician: 7/23/2024                         Would you like a call back once the refill request has been completed: [] Yes [] No    If the office needs to give you a call back, can they leave a voicemail: [] Yes [] No    Ana Edwards RN  07/02/24, 07:07 CDT

## 2024-07-23 ENCOUNTER — OFFICE VISIT (OUTPATIENT)
Dept: INTERNAL MEDICINE | Facility: CLINIC | Age: 29
End: 2024-07-23
Payer: COMMERCIAL

## 2024-07-23 VITALS
RESPIRATION RATE: 18 BRPM | WEIGHT: 223 LBS | OXYGEN SATURATION: 100 % | SYSTOLIC BLOOD PRESSURE: 127 MMHG | HEIGHT: 64 IN | DIASTOLIC BLOOD PRESSURE: 85 MMHG | BODY MASS INDEX: 38.07 KG/M2 | HEART RATE: 66 BPM | TEMPERATURE: 97.8 F

## 2024-07-23 DIAGNOSIS — Z12.4 SCREENING FOR MALIGNANT NEOPLASM OF CERVIX: ICD-10-CM

## 2024-07-23 DIAGNOSIS — Z00.00 ROUTINE GENERAL MEDICAL EXAMINATION AT A HEALTH CARE FACILITY: Primary | ICD-10-CM

## 2024-07-23 DIAGNOSIS — E55.9 VITAMIN D DEFICIENCY: ICD-10-CM

## 2024-07-23 PROCEDURE — 99395 PREV VISIT EST AGE 18-39: CPT | Performed by: NURSE PRACTITIONER

## 2024-07-23 NOTE — PROGRESS NOTES
Subjective     Chief Complaint   Patient presents with    Annual Exam       History of Present Illness  The patient is a 29-year-old female who presents for a physical exam.    She reports overall good health and is currently on Wegovy, which she tolerates well. Her weight has decreased from 272 pounds to 223 pounds. She is also on birth control, but is not sexually active. Her current medications include metformin, Strattera, spironolactone, Zoloft, metoprolol, and Synthroid. Her mood is stable on Zoloft. She has undergone a Pap smear in the past. She dislikes tampons due to pain. In 2010, she had an ovarian cyst removed. She denies any issues with urination or bowel movements, nausea, vomiting, or bloody stools. Her last tetanus vaccine was administered in 2018. She does not engage in regular exercise.   She works at [employer].    Patient's PMR from outside medical facility reviewed and noted.      Otherwise complete ROS reviewed and negative except as mentioned in the HPI.    Past Medical History:   Past Medical History:   Diagnosis Date    ADHD (attention deficit hyperactivity disorder)     Anxiety     Hyperlipidemia     Hypertension     Hypothyroidism      Past Surgical History:  Past Surgical History:   Procedure Laterality Date    LAPAROSCOPIC OVARIAN CYSTECTOMY       Social History:  reports that she has never smoked. She has never used smokeless tobacco. She reports that she does not drink alcohol and does not use drugs.    Family History: family history includes Depression in her mother; Diabetes in her mother; Heart disease in her maternal grandfather; Hyperlipidemia in her mother; No Known Problems in her father.       Allergies:  No Known Allergies  Medications:  Prior to Admission medications    Medication Sig Start Date End Date Taking? Authorizing Provider   pantoprazole (PROTONIX) 40 MG EC tablet TAKE 1 TABLET BY MOUTH EVERY DAY 6/26/24   Tanya Guthrie APRN   albuterol sulfate   (90 Base) MCG/ACT inhaler Inhale 2 puffs Every 4 (Four) Hours As Needed for Wheezing. 3/19/24   Tanya Guthrie APRN   atomoxetine (STRATTERA) 40 MG capsule TAKE 1 CAPSULE BY MOUTH EVERY DAY 7/2/24   Tanya Guthrie APRN   atorvastatin (LIPITOR) 40 MG tablet Take 1 tablet by mouth Daily. 1/12/22   Charo Alcaraz MD   fluticasone (FLONASE) 50 MCG/ACT nasal spray 2 sprays. 9/22/21   Charo Alcaraz MD   hydroCHLOROthiazide 12.5 MG tablet TAKE 1 TABLET BY MOUTH EVERY DAY 6/25/24   Tanya Guthrie APRN   levothyroxine (SYNTHROID, LEVOTHROID) 50 MCG tablet TAKE 1 TABLET BY MOUTH EVERY DAY 6/25/24   Tanya Guthrie APRN   metFORMIN ER (GLUCOPHAGE-XR) 500 MG 24 hr tablet TAKE 2 TABLETS BY MOUTH DAILY WITH BREAKFAST. 6/25/24   Tanya Guthrie APRN   metoprolol succinate XL (TOPROL-XL) 100 MG 24 hr tablet TAKE 1 TABLET BY MOUTH EVERY DAY 5/28/24   Jose Cuellar MD   Nortrel 7/7/7 0.5/0.75/1-35 MG-MCG per tablet TAKE 1 TABLET BY MOUTH EVERY DAY 4/19/24   Familia Edwards APRN   Semaglutide-Weight Management (Wegovy) 2.4 MG/0.75ML solution auto-injector Inject 2.4 mg under the skin into the appropriate area as directed 1 (One) Time Per Week. 3/8/24   Tanya Guthrie APRN   sertraline (ZOLOFT) 50 MG tablet TAKE 1 TABLET BY MOUTH EVERY DAY 6/25/24   Tanya Guthrie APRN   spironolactone (ALDACTONE) 25 MG tablet TAKE 1 TABLET BY MOUTH TWICE A DAY 6/25/24   Tanya Guthrie APRN   amoxicillin-clavulanate (AUGMENTIN) 875-125 MG per tablet Take 1 tablet by mouth 2 (Two) Times a Day. 3/19/24 7/23/24  Tanya Guthrie APRN   diclofenac-miSOPROStol (ARTHROTEC 75) 75-0.2 MG EC tablet Take 1 tablet by mouth 2 (Two) Times a Day.  7/23/24  Provider, MD Charo   methylPREDNISolone (MEDROL) 4 MG dose pack Take as directed on package instructions. 3/19/24 7/23/24  Tanya Guthrie APRN   mupirocin (BACTROBAN) 2 % ointment  "Apply 1 Application topically to the appropriate area as directed 2 (Two) Times a Day. 3/8/24 7/23/24  Tanya Guthrie APRN       Objective     Vital Signs: /85   Pulse 66   Temp 97.8 °F (36.6 °C)   Resp 18   Ht 162.6 cm (64.02\")   Wt 101 kg (223 lb)   SpO2 100%   BMI 38.25 kg/m²     Physical Exam  Vital Signs  Patient's weight is 223.    Physical Exam  Vitals reviewed. Exam conducted with a chaperone present.   Constitutional:       Appearance: She is well-developed. She is obese.   HENT:      Head: Normocephalic and atraumatic.   Eyes:      Pupils: Pupils are equal, round, and reactive to light.   Neck:      Vascular: No JVD.   Cardiovascular:      Rate and Rhythm: Normal rate and regular rhythm.   Pulmonary:      Effort: Pulmonary effort is normal.   Abdominal:      General: Bowel sounds are normal.      Palpations: Abdomen is soft.   Genitourinary:     General: Normal vulva.      Labia:         Right: No lesion.         Left: No lesion.       Vagina: Normal.      Cervix: Normal.      Uterus: Normal.       Adnexa: Right adnexa normal and left adnexa normal.   Musculoskeletal:         General: No swelling or deformity.      Cervical back: Normal range of motion and neck supple.   Lymphadenopathy:      Cervical: No cervical adenopathy.   Skin:     General: Skin is warm and dry.   Neurological:      Mental Status: She is alert and oriented to person, place, and time.   Psychiatric:         Behavior: Behavior normal.         Thought Content: Thought content normal.         Judgment: Judgment normal.       Results Reviewed:  Glucose   Date Value Ref Range Status   12/18/2023 89 70 - 99 mg/dL Final   03/13/2020 82 74 - 109 mg/dL Final     BUN   Date Value Ref Range Status   12/18/2023 10 6 - 20 mg/dL Final   03/13/2020 16 6 - 20 mg/dL Final     Creatinine   Date Value Ref Range Status   12/18/2023 0.80 0.57 - 1.00 mg/dL Final   03/13/2020 0.7 0.5 - 0.9 mg/dL Final     Sodium   Date Value Ref Range " Status   12/18/2023 139 134 - 144 mmol/L Final   03/13/2020 139 136 - 145 mmol/L Final     Potassium   Date Value Ref Range Status   12/18/2023 4.9 3.5 - 5.2 mmol/L Final   03/13/2020 4.1 3.5 - 5.0 mmol/L Final     Chloride   Date Value Ref Range Status   12/18/2023 102 96 - 106 mmol/L Final   03/13/2020 100 98 - 111 mmol/L Final     CO2   Date Value Ref Range Status   03/13/2020 23 22 - 29 mmol/L Final     Total CO2   Date Value Ref Range Status   12/18/2023 21 20 - 29 mmol/L Final     Calcium   Date Value Ref Range Status   12/18/2023 9.5 8.7 - 10.2 mg/dL Final   03/13/2020 9.8 8.6 - 10.0 mg/dL Final     ALT (SGPT)   Date Value Ref Range Status   12/18/2023 13 0 - 32 IU/L Final   03/13/2020 15 5 - 33 U/L Final     AST (SGOT)   Date Value Ref Range Status   12/18/2023 11 0 - 40 IU/L Final   03/13/2020 15 5 - 32 U/L Final     WBC   Date Value Ref Range Status   12/18/2023 8.0 3.4 - 10.8 x10E3/uL Final   03/13/2020 10.2 4.8 - 10.8 K/uL Final     Hematocrit   Date Value Ref Range Status   12/18/2023 38.8 34.0 - 46.6 % Final   03/13/2020 39.3 37.0 - 47.0 % Final     Platelets   Date Value Ref Range Status   12/18/2023 258 150 - 450 x10E3/uL Final   03/13/2020 266 130 - 400 K/uL Final     Triglycerides   Date Value Ref Range Status   01/05/2023 180 (H) 0 - 149 mg/dL Final   07/26/2018 113 0 - 149 mg/dL Final     HDL Cholesterol   Date Value Ref Range Status   01/05/2023 55 >39 mg/dL Final   07/26/2018 66 65 - 121 mg/dL Final     Comment:     VALUES>60 MG/DL ARE ASSOCIATED WITH A DECREASED RISK OF  ATHEROSCLEROTIC CARDIOVASCULAR DISEASE     LDL Cholesterol    Date Value Ref Range Status   07/26/2018 66 <100 mg/dL Final     Comment:     <100 MG/DL=OPITIMAL    100-129 MG/DL=DESIRABLE    130-159 MG/DL BORDERLINE=INCREASED RISK OF ATHEROSCLEROTIC  CARDIOVASCULAR DISEASE    > OR = 160 MG/DL=ASSOCIATED WITH AN INCREASE RISK OF  ATHEROSCLEROTIC CARDIOVASCULAR DISEASE     LDL Chol Calc ([facility])   Date Value Ref Range Status    01/05/2023 85 0 - 99 mg/dL Final     Hemoglobin A1C   Date Value Ref Range Status   04/04/2023 5.4 % Final       Results        Assessment / Plan     Assessment/Plan:  Diagnoses and all orders for this visit:    1. Routine general medical examination at a health care facility (Primary)  -     CBC & Differential  -     Comprehensive Metabolic Panel  -     Lipid Panel  -     TSH  -     Vitamin B12  -     Hemoglobin A1c  -     T4, Free    2. Vitamin D deficiency  -     Vitamin D,25-Hydroxy    3. Screening for malignant neoplasm of cervix  -     IGP, Rfx Aptima HPV ASCU        Assessment & Plan  1. Annual physical.  Her blood pressure readings are within the normal range. A Pap smear was performed today. She was advised to engage in pool exercises to enhance toning.    Normal GYN exam. Will have lab work here today. Discussed weight management and importance of maintaining a healthy weight. Discussed Vitamin D intake and the importance of adequate vitamin D for both Bone Health and a healthy immune system.  Discussed Daily exercise and the importance of same, in regards to a healthy heart as well as helping to maintain her weight and improving her mental health.  BMI is elevated but improved.    Pap smear is done per ASCCP guidelines.      No follow-ups on file. unless patient needs to be seen sooner or acute issues arise.    Code Status: Full.   Patient or patient representative verbalized consent for the use of Ambient Listening during the visit with  TIAGO Clarke for chart documentation. 7/23/2024  12:43 CDT  I have discussed the patient results/orders and and plan/recommendation with them at today's visit.      Signed by:    TIAGO Clarke Date: 07/23/24

## 2024-07-24 LAB
25(OH)D3+25(OH)D2 SERPL-MCNC: 56.9 NG/ML (ref 30–100)
ALBUMIN SERPL-MCNC: 4.6 G/DL (ref 4–5)
ALP SERPL-CCNC: 55 IU/L (ref 44–121)
ALT SERPL-CCNC: 23 IU/L (ref 0–32)
AST SERPL-CCNC: 16 IU/L (ref 0–40)
BASOPHILS # BLD AUTO: 0 X10E3/UL (ref 0–0.2)
BASOPHILS NFR BLD AUTO: 0 %
BILIRUB SERPL-MCNC: 0.3 MG/DL (ref 0–1.2)
BUN SERPL-MCNC: 12 MG/DL (ref 6–20)
BUN/CREAT SERPL: 16 (ref 9–23)
CALCIUM SERPL-MCNC: 9.6 MG/DL (ref 8.7–10.2)
CHLORIDE SERPL-SCNC: 98 MMOL/L (ref 96–106)
CHOLEST SERPL-MCNC: 230 MG/DL (ref 100–199)
CO2 SERPL-SCNC: 21 MMOL/L (ref 20–29)
CREAT SERPL-MCNC: 0.76 MG/DL (ref 0.57–1)
EGFRCR SERPLBLD CKD-EPI 2021: 108 ML/MIN/1.73
EOSINOPHIL # BLD AUTO: 0.2 X10E3/UL (ref 0–0.4)
EOSINOPHIL NFR BLD AUTO: 3 %
ERYTHROCYTE [DISTWIDTH] IN BLOOD BY AUTOMATED COUNT: 12.3 % (ref 11.7–15.4)
GLOBULIN SER CALC-MCNC: 2.7 G/DL (ref 1.5–4.5)
GLUCOSE SERPL-MCNC: 83 MG/DL (ref 70–99)
HBA1C MFR BLD: 5.4 % (ref 4.8–5.6)
HCT VFR BLD AUTO: 39.8 % (ref 34–46.6)
HDLC SERPL-MCNC: 69 MG/DL
HGB BLD-MCNC: 12.7 G/DL (ref 11.1–15.9)
IMM GRANULOCYTES # BLD AUTO: 0 X10E3/UL (ref 0–0.1)
IMM GRANULOCYTES NFR BLD AUTO: 0 %
LDLC SERPL CALC-MCNC: 131 MG/DL (ref 0–99)
LYMPHOCYTES # BLD AUTO: 2.2 X10E3/UL (ref 0.7–3.1)
LYMPHOCYTES NFR BLD AUTO: 32 %
MCH RBC QN AUTO: 27.8 PG (ref 26.6–33)
MCHC RBC AUTO-ENTMCNC: 31.9 G/DL (ref 31.5–35.7)
MCV RBC AUTO: 87 FL (ref 79–97)
MONOCYTES # BLD AUTO: 0.4 X10E3/UL (ref 0.1–0.9)
MONOCYTES NFR BLD AUTO: 6 %
NEUTROPHILS # BLD AUTO: 4 X10E3/UL (ref 1.4–7)
NEUTROPHILS NFR BLD AUTO: 59 %
PLATELET # BLD AUTO: 267 X10E3/UL (ref 150–450)
POTASSIUM SERPL-SCNC: 4.2 MMOL/L (ref 3.5–5.2)
PROT SERPL-MCNC: 7.3 G/DL (ref 6–8.5)
RBC # BLD AUTO: 4.57 X10E6/UL (ref 3.77–5.28)
SODIUM SERPL-SCNC: 137 MMOL/L (ref 134–144)
T4 FREE SERPL-MCNC: 1.14 NG/DL (ref 0.82–1.77)
TRIGL SERPL-MCNC: 170 MG/DL (ref 0–149)
TSH SERPL DL<=0.005 MIU/L-ACNC: 1.41 UIU/ML (ref 0.45–4.5)
VIT B12 SERPL-MCNC: 580 PG/ML (ref 232–1245)
VLDLC SERPL CALC-MCNC: 30 MG/DL (ref 5–40)
WBC # BLD AUTO: 6.9 X10E3/UL (ref 3.4–10.8)

## 2024-07-26 LAB
CONV .: NORMAL
CYTOLOGIST CVX/VAG CYTO: NORMAL
CYTOLOGY CVX/VAG DOC CYTO: NORMAL
CYTOLOGY CVX/VAG DOC THIN PREP: NORMAL
DX ICD CODE: NORMAL
Lab: NORMAL
OTHER STN SPEC: NORMAL
STAT OF ADQ CVX/VAG CYTO-IMP: NORMAL

## 2024-09-11 ENCOUNTER — E-VISIT (OUTPATIENT)
Dept: ADMINISTRATIVE | Facility: OTHER | Age: 29
End: 2024-09-11
Payer: COMMERCIAL

## 2024-09-11 NOTE — E-VISIT ESCALATED
Status: Referred Out  Date: 2024 16:19:09  Acuity Level: Within 8 hours  Referral message:  We're sorry you are not feeling well. Your safety is important to us. Moderate abdominal pain can signify a more serious condition. For this reason, we would like for you to be seen in person to determine the cause of your symptoms and   the most effective treatment for you.  For the most appropriate care, please be seen:   At a clinic or an urgent care  Within 8 hours   You won't be charged for this visit. We hope you feel better soon!   Patient: Dayana Leavitt  Patient : 1995  Patient Address: [address] [place] [id number]  Patient Phone: [phone]  Clinician Response: Unavailable  Diagnosis: Unavailable  Diagnosis ICD: Unavailable     Patient Interview Questions and Responses:  Clinical Protocol: Gastrointestinal conditions  Please select the reason for your visit today.: Stomach issues  Do you have any of the following symptoms? Constipation: No  Do you have any of the following symptoms? Diarrhea: Yes  Do you have any of the following symptoms? Abdominal pain: Yes  When did your diarrhea start?: 1-3 days ago  Please enter the number of bowel movements you have had in the past 24 hours.: 4  Does the diarrhea interfere with your sleep?: No  Please rate the severity of your abdominal pain on a pain scale, with 0 being no pain and 10 being the worst pain imaginable.: 4-6 = Moderate pain (interferes with normal daily activities)

## 2024-09-30 ENCOUNTER — E-VISIT (OUTPATIENT)
Dept: FAMILY MEDICINE CLINIC | Facility: TELEHEALTH | Age: 29
End: 2024-09-30
Payer: COMMERCIAL

## 2024-09-30 PROCEDURE — FABRICHEALTHVISIT: Performed by: NURSE PRACTITIONER

## 2024-09-30 RX ORDER — AMOXICILLIN 500 MG/1
500 CAPSULE ORAL 3 TIMES DAILY
Start: 2024-09-30 | End: 2024-10-10

## 2024-09-30 NOTE — E-VISIT TREATED
Date: 2024 19:31:03  Clinician: Magalys Greco  Clinician NPI: [id number]  Patient: Dayana Leavitt  Patient : 1995  Patient Address: [address] [place] [address] [address], [place] [id number]  Patient Phone: [phone]  Visit Protocol: URI  Patient Summary:  Dayana is a 29 year old ( : 1995 ) female who initiated a visit for cold, sinus infection, or influenza.     Dayana states the symptoms started 1-2 days ago.   Symptom start date: 1995   The symptoms consist of a sore   throat, facial pain or pressure, a cough, chills, malaise, nasal congestion, enlarged lymph nodes, and ear pain.   Symptom details     Nasal secretions: The color of the mucus is yellow.    Cough: Dayana coughs every 5-10 minutes and the cough is not more bothersome at night. Phlegm comes into the throat when coughing. Dayana does not believe the cough is caused by post-nasal drip. The color of the phlegm is yellow.     Sore throat: Dayana reports having severe throat pain (7-9 on a 10 point pain scale), has exudate on the tonsils, and can swallow liquids with mild discomfort. The lymph nodes in the neck are enlarged. The lymph node swelling is worse on one side and   the swelling has caused changes in speech or hoarseness in the voice. A rash has not appeared on the skin since the sore throat started.     Facial pain or pressure: The facial pain or pressure does not feel worse when bending or leaning forward.      Dayana denies having vomiting, diarrhea, anosmia, nausea, teeth pain, fever, ageusia, wheezing, myalgias, rhinitis, and headache. Dayana also denies having recent facial or sinus surgery in the past 60 days, having a sinus infection within the past   year, and taking antibiotic medication in the past month. Dayana is not experiencing dyspnea.   Precipitating events  Dayana is not sure if they have been exposed to someone with strep throat. Dayana has not recently been exposed to someone with   influenza. Dayana has not been  in close contact with any high risk individuals.    Dayana has received the influenza vaccine more than 2 weeks ago.   Pertinent COVID-19 (Coronavirus) information  Since the symptoms started, Dayana has tested for   COVID-19. The result of the test was negative.   Dayana has not had COVID-19 in the last 3 months.   Dayana has not received a COVID-19 vaccine in the past year.   Pertinent medical history    Dayana reports having the following conditions:     Hypertension    A provider has not told Dayana to avoid NSAIDs.   Dayana does not get yeast infections when an antibiotic is taken.   Dayana does not have diabetes. Dayana denies having immunosuppressive conditions (e.g., chemotherapy, HIV, organ   transplant, long-term use of steroids or other immunosuppressive medications, splenectomy). Dayana does not have asthma.   Dayana does not smoke or use smokeless tobacco. Dayana does not vape or use other e-cigarette products.   Dayana denies pregnancy   and denies breastfeeding.   Weight: 230 lbs (104.33 kg)    MEDICATIONS: sertraline oral, Wegovy subcutaneous, pantoprazole oral, albuterol sulfate inhalation, methylprednisolone sod succ (PF) injection, fluticasone propionate nasal, penicillin V potassium oral, ketorolac intramuscular, metoprolol succinate   oral, hydrochlorothiazide oral, metformin oral, spironolactone oral, Nortrel 7/7/7 (28) oral, ALLERGIES: NKDA  Clinician Response:  Dear Dayana,   I am prescribing an antibiotic since you report swollen tonsils with pus. Please change your toothbrush after 1-2 days of antibiotics. May use tylenol/motrin/salt water gargles for pain.    Diagnosis: Acute tonsillitis, unspecified  Diagnosis ICD: J03.90    Follow up instructions: ATTENTION: If you have been prescribed medications, your prescriptions will not be sent until you choose your pharmacy.  To do so open the link within your notification, or go to [facility] and click eVisit in the menu to open your   treatment  plan. From there, you can select your pharmacy at the bottom of your after visit summary. You can also go to [email or web address]  Prescriptions  Prescription: amoxicillin 500 mg oral capsule, take 1 capsule by mouth every 8 hours for 10 days  Sent To: [id number] - [id number] - [address] [place] [address]

## 2024-10-25 ENCOUNTER — OFFICE VISIT (OUTPATIENT)
Dept: INTERNAL MEDICINE | Facility: CLINIC | Age: 29
End: 2024-10-25
Payer: COMMERCIAL

## 2024-10-25 VITALS
WEIGHT: 220 LBS | BODY MASS INDEX: 37.56 KG/M2 | OXYGEN SATURATION: 100 % | SYSTOLIC BLOOD PRESSURE: 126 MMHG | RESPIRATION RATE: 17 BRPM | HEART RATE: 96 BPM | DIASTOLIC BLOOD PRESSURE: 88 MMHG | HEIGHT: 64 IN

## 2024-10-25 DIAGNOSIS — S99.911A INJURY OF RIGHT ANKLE, INITIAL ENCOUNTER: ICD-10-CM

## 2024-10-25 DIAGNOSIS — M25.571 ACUTE RIGHT ANKLE PAIN: Primary | ICD-10-CM

## 2024-10-25 PROCEDURE — 99213 OFFICE O/P EST LOW 20 MIN: CPT

## 2024-10-25 NOTE — PROGRESS NOTES
Subjective     Chief Complaint   Patient presents with    Ankle Pain     Injured on Monday.        Ankle Pain       History of Present Illness  The patient presents for evaluation of right ankle pain.    She reports an incident on Monday where she accidentally hit her right ankle against a rocking chair. Despite the pain, she is able to walk, albeit with discomfort. She has been managing the pain with Tylenol and Motrin, which she describes as effective in controlling the discomfort.    Patient's PMR from outside medical facility reviewed and noted.    Review of Systems   Musculoskeletal:  Positive for arthralgias and joint swelling.        Otherwise complete ROS reviewed and negative except as mentioned in the HPI.    Past Medical History:   Past Medical History:   Diagnosis Date    ADHD (attention deficit hyperactivity disorder)     Anxiety     Hyperlipidemia     Hypertension     Hypothyroidism      Past Surgical History:  Past Surgical History:   Procedure Laterality Date    LAPAROSCOPIC OVARIAN CYSTECTOMY       Social History:  reports that she has never smoked. She has never used smokeless tobacco. She reports that she does not drink alcohol and does not use drugs.    Family History: family history includes Depression in her mother; Diabetes in her mother; Heart disease in her maternal grandfather; Hyperlipidemia in her mother; No Known Problems in her father.      Allergies:  No Known Allergies  Medications:  Prior to Admission medications    Medication Sig Start Date End Date Taking? Authorizing Provider   albuterol sulfate  (90 Base) MCG/ACT inhaler Inhale 2 puffs Every 4 (Four) Hours As Needed for Wheezing. 3/19/24  Yes Tanya Guthrie APRN   atomoxetine (STRATTERA) 40 MG capsule TAKE 1 CAPSULE BY MOUTH EVERY DAY 7/2/24  Yes Tanya Guthrie APRN   atorvastatin (LIPITOR) 40 MG tablet Take 1 tablet by mouth Daily. 1/12/22  Yes Provider, MD Charo   fluticasone (FLONASE)  50 MCG/ACT nasal spray 2 sprays. 9/22/21  Yes Provider, MD Charo   hydroCHLOROthiazide 12.5 MG tablet TAKE 1 TABLET BY MOUTH EVERY DAY 6/25/24  Yes Tanya Guthrie APRN   levothyroxine (SYNTHROID, LEVOTHROID) 50 MCG tablet TAKE 1 TABLET BY MOUTH EVERY DAY 6/25/24  Yes Tanya Guthrie APRN   metFORMIN ER (GLUCOPHAGE-XR) 500 MG 24 hr tablet TAKE 2 TABLETS BY MOUTH DAILY WITH BREAKFAST. 6/25/24  Yes Tanya Guthrie APRN   metoprolol succinate XL (TOPROL-XL) 100 MG 24 hr tablet TAKE 1 TABLET BY MOUTH EVERY DAY 5/28/24  Yes Jose Cuellar MD   Nortrel 7/7/7 0.5/0.75/1-35 MG-MCG per tablet TAKE 1 TABLET BY MOUTH EVERY DAY 4/19/24  Yes Familia Edwards APRN   pantoprazole (PROTONIX) 40 MG EC tablet TAKE 1 TABLET BY MOUTH EVERY DAY 6/26/24  Yes Tanya Guthrie APRN   Semaglutide-Weight Management (Wegovy) 2.4 MG/0.75ML solution auto-injector Inject 2.4 mg under the skin into the appropriate area as directed 1 (One) Time Per Week. 3/8/24  Yes Tanya Guthrie APRN   sertraline (ZOLOFT) 50 MG tablet TAKE 1 TABLET BY MOUTH EVERY DAY 6/25/24  Yes Tanya Guthrie APRN   spironolactone (ALDACTONE) 25 MG tablet TAKE 1 TABLET BY MOUTH TWICE A DAY 6/25/24  Yes Tanya Guthrie APRN       EL:        PHQ-9 Depression Screening  Little interest or pleasure in doing things?     Feeling down, depressed, or hopeless?     PHQ-2 Total Score     Trouble falling or staying asleep, or sleeping too much?     Feeling tired or having little energy?     Poor appetite or overeating?     Feeling bad about yourself - or that you are a failure or have let yourself or your family down?     Trouble concentrating on things, such as reading the newspaper or watching television?     Moving or speaking so slowly that other people could have noticed? Or the opposite - being so fidgety or restless that you have been moving around a lot more than usual?     Thoughts that you would  "be better off dead, or of hurting yourself in some way?     PHQ-9 Total Score     If you checked off any problems, how difficult have these problems made it for you to do your work, take care of things at home, or get along with other people?             Objective     Vital Signs: /88 (BP Location: Left arm, Patient Position: Sitting, Cuff Size: Large Adult)   Pulse 96   Resp 17   Ht 162.6 cm (64.02\")   Wt 99.8 kg (220 lb)   SpO2 100%   BMI 37.74 kg/m²   Physical Exam  Vitals and nursing note reviewed.   Constitutional:       Appearance: Normal appearance.   HENT:      Right Ear: External ear normal.      Left Ear: External ear normal.   Cardiovascular:      Rate and Rhythm: Normal rate and regular rhythm.      Pulses: Normal pulses.      Heart sounds: Normal heart sounds.   Pulmonary:      Effort: Pulmonary effort is normal.      Breath sounds: Normal breath sounds.   Musculoskeletal:      Right lower leg: Edema (right ankle swelling) present.      Comments: Limited ROM of right ankle, bruising and swelling noted to lateral right ankle    Skin:     General: Skin is warm.      Findings: Bruising present.   Neurological:      General: No focal deficit present.      Mental Status: She is alert and oriented to person, place, and time.      Gait: Gait abnormal.   Psychiatric:         Mood and Affect: Mood normal.         Behavior: Behavior normal.                Advance Care Planning   ACP discussion was held with the patient during this visit.         Results Reviewed:  Glucose   Date Value Ref Range Status   07/23/2024 83 70 - 99 mg/dL Final   03/13/2020 82 74 - 109 mg/dL Final     BUN   Date Value Ref Range Status   07/23/2024 12 6 - 20 mg/dL Final   03/13/2020 16 6 - 20 mg/dL Final     Creatinine   Date Value Ref Range Status   07/23/2024 0.76 0.57 - 1.00 mg/dL Final   03/13/2020 0.7 0.5 - 0.9 mg/dL Final     Sodium   Date Value Ref Range Status   07/23/2024 137 134 - 144 mmol/L Final   03/13/2020 139 " 136 - 145 mmol/L Final     Potassium   Date Value Ref Range Status   07/23/2024 4.2 3.5 - 5.2 mmol/L Final   03/13/2020 4.1 3.5 - 5.0 mmol/L Final     Chloride   Date Value Ref Range Status   07/23/2024 98 96 - 106 mmol/L Final   03/13/2020 100 98 - 111 mmol/L Final     CO2   Date Value Ref Range Status   03/13/2020 23 22 - 29 mmol/L Final     Total CO2   Date Value Ref Range Status   07/23/2024 21 20 - 29 mmol/L Final     Calcium   Date Value Ref Range Status   07/23/2024 9.6 8.7 - 10.2 mg/dL Final   03/13/2020 9.8 8.6 - 10.0 mg/dL Final     ALT (SGPT)   Date Value Ref Range Status   07/23/2024 23 0 - 32 IU/L Final   03/13/2020 15 5 - 33 U/L Final     AST (SGOT)   Date Value Ref Range Status   07/23/2024 16 0 - 40 IU/L Final   03/13/2020 15 5 - 32 U/L Final     WBC   Date Value Ref Range Status   07/23/2024 6.9 3.4 - 10.8 x10E3/uL Final   03/13/2020 10.2 4.8 - 10.8 K/uL Final     Hematocrit   Date Value Ref Range Status   07/23/2024 39.8 34.0 - 46.6 % Final   03/13/2020 39.3 37.0 - 47.0 % Final     Platelets   Date Value Ref Range Status   07/23/2024 267 150 - 450 x10E3/uL Final   03/13/2020 266 130 - 400 K/uL Final     Triglycerides   Date Value Ref Range Status   07/23/2024 170 (H) 0 - 149 mg/dL Final   07/26/2018 113 0 - 149 mg/dL Final     HDL Cholesterol   Date Value Ref Range Status   07/23/2024 69 >39 mg/dL Final   07/26/2018 66 65 - 121 mg/dL Final     Comment:     VALUES>60 MG/DL ARE ASSOCIATED WITH A DECREASED RISK OF  ATHEROSCLEROTIC CARDIOVASCULAR DISEASE     LDL Cholesterol    Date Value Ref Range Status   07/26/2018 66 <100 mg/dL Final     Comment:     <100 MG/DL=OPITIMAL    100-129 MG/DL=DESIRABLE    130-159 MG/DL BORDERLINE=INCREASED RISK OF ATHEROSCLEROTIC  CARDIOVASCULAR DISEASE    > OR = 160 MG/DL=ASSOCIATED WITH AN INCREASE RISK OF  ATHEROSCLEROTIC CARDIOVASCULAR DISEASE     LDL Chol Calc (NIH)   Date Value Ref Range Status   07/23/2024 131 (H) 0 - 99 mg/dL Final     Hemoglobin A1C   Date Value  Ref Range Status   07/23/2024 5.4 4.8 - 5.6 % Final     Comment:              Prediabetes: 5.7 - 6.4           Diabetes: >6.4           Glycemic control for adults with diabetes: <7.0     04/04/2023 5.4 % Final         Assessment / Plan     Assessment/Plan:    1. Acute right ankle pain    - XR Ankle 3+ View Right (In Office)  - Miscellaneous DME    2. Injury of right ankle, initial encounter    - XR Ankle 3+ View Right (In Office)  - Miscellaneous DME    Diagnoses and all orders for this visit:    1. Acute right ankle pain (Primary)  -     XR Ankle 3+ View Right (In Office)  -     Miscellaneous DME    2. Injury of right ankle, initial encounter  -     XR Ankle 3+ View Right (In Office)  -     Miscellaneous DME        Assessment & Plan  1. Right ankle pain.  The patient reports pain in the right ankle after hitting it on a rocking chair on Monday. She can walk on it, but it hurts, and she has difficulty rotating it. The ankle is bruised and swollen. An x-ray of the right ankle has been ordered to rule out any fractures or structural abnormalities. She is advised to continue taking Tylenol and Motrin for pain management. Depending on the x-ray results, a walking boot may be prescribed. The order for the walking boot will be printed, and she can obtain it from [facility] or [facility].      No follow-ups on file. unless patient needs to be seen sooner or acute issues arise.      I have discussed the patient results/orders and and plan/recommendation with them at today's visit.    Patient or patient representative verbalized consent for the use of Ambient Listening during the visit with  TIAGO Salguero for chart documentation. 11/6/2024  15:46 CST  TIAGO Salguero   10/25/2024

## 2024-12-11 ENCOUNTER — OFFICE VISIT (OUTPATIENT)
Dept: INTERNAL MEDICINE | Facility: CLINIC | Age: 29
End: 2024-12-11
Payer: COMMERCIAL

## 2024-12-11 VITALS
RESPIRATION RATE: 20 BRPM | DIASTOLIC BLOOD PRESSURE: 85 MMHG | HEIGHT: 64 IN | WEIGHT: 264 LBS | HEART RATE: 92 BPM | SYSTOLIC BLOOD PRESSURE: 137 MMHG | BODY MASS INDEX: 45.07 KG/M2 | OXYGEN SATURATION: 98 %

## 2024-12-11 DIAGNOSIS — R20.0 NUMBNESS AND TINGLING IN BOTH HANDS: ICD-10-CM

## 2024-12-11 DIAGNOSIS — R20.2 NUMBNESS AND TINGLING IN BOTH HANDS: ICD-10-CM

## 2024-12-11 DIAGNOSIS — M79.641 BILATERAL HAND PAIN: Primary | ICD-10-CM

## 2024-12-11 DIAGNOSIS — M79.642 BILATERAL HAND PAIN: Primary | ICD-10-CM

## 2024-12-11 PROCEDURE — 99213 OFFICE O/P EST LOW 20 MIN: CPT

## 2024-12-11 NOTE — PROGRESS NOTES
"        Subjective     Chief Complaint   Patient presents with    Hand Problem     Bilateral hand pain. Numbness. Difficulty gripping. \"Firey\"        Hand Problem  Symptoms include numbness.    Pertinent negative symptoms include no abdominal pain, no chest pain, no chills, no congestion, no cough, no diaphoresis, no fatigue, no fever, no headaches, no myalgias, no nausea, no neck pain, no rash, no sore throat, no dysuria, no vomiting and no weakness.     History of Present Illness  The patient presents for evaluation of suspected carpal tunnel syndrome.    She reports experiencing numbness and tingling in her thumb, index, and middle fingers, bilaterally. She describes a sensation of pressure in both hands, which has recently begun to interfere with her ability to  objects. 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 She does not experience any associated neck pain. The symptoms have been present for approximately one month and have progressively worsened over time. The onset of symptoms was first noted in her left hand. She also reports instances of dropping her phone unexpectedly. She does not experience any tremors or forearm pain. She has attempted to manage the symptoms with compression gloves, which have provided some relief. Denies any neck pain or numbness/tingling up arms     SOCIAL HISTORY  She works as a pharmacy technician at [employer].    Patient's PMR from outside medical facility reviewed and noted.    Review of Systems   Constitutional:  Negative for chills, diaphoresis, fatigue and fever.   HENT:  Negative for congestion and sore throat.    Respiratory:  Negative for cough.    Cardiovascular:  Negative for chest pain.   Gastrointestinal:  Negative for abdominal pain, nausea and vomiting.   Genitourinary:  Negative for dysuria.   Musculoskeletal:  Negative for myalgias and neck pain.   Skin:  Negative for rash.   Neurological:  Positive for numbness. Negative for weakness " and headaches.        Otherwise complete ROS reviewed and negative except as mentioned in the HPI.    Past Medical History:   Past Medical History:   Diagnosis Date    ADHD (attention deficit hyperactivity disorder)     Anxiety     Hyperlipidemia     Hypertension     Hypothyroidism      Past Surgical History:  Past Surgical History:   Procedure Laterality Date    LAPAROSCOPIC OVARIAN CYSTECTOMY       Social History:  reports that she has never smoked. She has never used smokeless tobacco. She reports that she does not drink alcohol and does not use drugs.    Family History: family history includes Depression in her mother; Diabetes in her mother; Heart disease in her maternal grandfather; Hyperlipidemia in her mother; No Known Problems in her father.      Allergies:  No Known Allergies  Medications:  Prior to Admission medications    Medication Sig Start Date End Date Taking? Authorizing Provider   albuterol sulfate  (90 Base) MCG/ACT inhaler Inhale 2 puffs Every 4 (Four) Hours As Needed for Wheezing. 3/19/24  Yes Tanya Guthrie APRN   atomoxetine (STRATTERA) 40 MG capsule TAKE 1 CAPSULE BY MOUTH EVERY DAY 7/2/24  Yes Tanya Guhtrie APRN   atorvastatin (LIPITOR) 40 MG tablet Take 1 tablet by mouth Daily. 1/12/22  Yes Charo Alcaraz MD   fluticasone (FLONASE) 50 MCG/ACT nasal spray 2 sprays. 9/22/21  Yes Charo Alcaraz MD   hydroCHLOROthiazide 12.5 MG tablet TAKE 1 TABLET BY MOUTH EVERY DAY 6/25/24  Yes Tanya Guthrie APRN   levothyroxine (SYNTHROID, LEVOTHROID) 50 MCG tablet TAKE 1 TABLET BY MOUTH EVERY DAY 6/25/24  Yes Tanya Guthrie APRN   metFORMIN ER (GLUCOPHAGE-XR) 500 MG 24 hr tablet TAKE 2 TABLETS BY MOUTH DAILY WITH BREAKFAST. 6/25/24  Yes Tanya Guthrie APRN   metoprolol succinate XL (TOPROL-XL) 100 MG 24 hr tablet TAKE 1 TABLET BY MOUTH EVERY DAY 5/28/24  Yes Jsoe Cuellar MD   Nortrel 7/7/7 0.5/0.75/1-35 MG-MCG per tablet  "TAKE 1 TABLET BY MOUTH EVERY DAY 4/19/24  Yes Familia Edwards APRN   pantoprazole (PROTONIX) 40 MG EC tablet TAKE 1 TABLET BY MOUTH EVERY DAY 6/26/24  Yes Tanya Guthrie APRN   Semaglutide-Weight Management (Wegovy) 2.4 MG/0.75ML solution auto-injector Inject 2.4 mg under the skin into the appropriate area as directed 1 (One) Time Per Week. 3/8/24  Yes Tanya Guthrie APRN   sertraline (ZOLOFT) 50 MG tablet TAKE 1 TABLET BY MOUTH EVERY DAY 6/25/24  Yes Tanya Guthrie APRN   spironolactone (ALDACTONE) 25 MG tablet TAKE 1 TABLET BY MOUTH TWICE A DAY 6/25/24  Yes Tanya Guthrie APRN       EL:        PHQ-9 Depression Screening  Little interest or pleasure in doing things?     Feeling down, depressed, or hopeless?     PHQ-2 Total Score     Trouble falling or staying asleep, or sleeping too much?     Feeling tired or having little energy?     Poor appetite or overeating?     Feeling bad about yourself - or that you are a failure or have let yourself or your family down?     Trouble concentrating on things, such as reading the newspaper or watching television?     Moving or speaking so slowly that other people could have noticed? Or the opposite - being so fidgety or restless that you have been moving around a lot more than usual?     Thoughts that you would be better off dead, or of hurting yourself in some way?     PHQ-9 Total Score     If you checked off any problems, how difficult have these problems made it for you to do your work, take care of things at home, or get along with other people?           Objective     Vital Signs: /85 (BP Location: Right arm, Patient Position: Sitting, Cuff Size: Large Adult)   Pulse 92   Resp 20   Ht 162.6 cm (64.02\")   Wt 120 kg (264 lb)   SpO2 98%   BMI 45.29 kg/m²   Physical Exam  Constitutional:       Appearance: Normal appearance.   HENT:      Head: Normocephalic.      Right Ear: External ear normal.      Left Ear: External ear " normal.      Nose: Nose normal.      Mouth/Throat:      Mouth: Mucous membranes are moist.   Cardiovascular:      Rate and Rhythm: Normal rate and regular rhythm.      Pulses: Normal pulses.      Heart sounds: Normal heart sounds.   Pulmonary:      Effort: Pulmonary effort is normal.      Breath sounds: Normal breath sounds.   Musculoskeletal:      Comments: Bilateral reduced  strength.    Neurological:      General: No focal deficit present.      Mental Status: She is alert and oriented to person, place, and time.   Psychiatric:         Mood and Affect: Mood normal.         Behavior: Behavior normal.              Advance Care Planning   ACP discussion was held with the patient during this visit.         Results Reviewed:  Glucose   Date Value Ref Range Status   07/23/2024 83 70 - 99 mg/dL Final   03/13/2020 82 74 - 109 mg/dL Final     BUN   Date Value Ref Range Status   07/23/2024 12 6 - 20 mg/dL Final   03/13/2020 16 6 - 20 mg/dL Final     Creatinine   Date Value Ref Range Status   07/23/2024 0.76 0.57 - 1.00 mg/dL Final   03/13/2020 0.7 0.5 - 0.9 mg/dL Final     Sodium   Date Value Ref Range Status   07/23/2024 137 134 - 144 mmol/L Final   03/13/2020 139 136 - 145 mmol/L Final     Potassium   Date Value Ref Range Status   07/23/2024 4.2 3.5 - 5.2 mmol/L Final   03/13/2020 4.1 3.5 - 5.0 mmol/L Final     Chloride   Date Value Ref Range Status   07/23/2024 98 96 - 106 mmol/L Final   03/13/2020 100 98 - 111 mmol/L Final     CO2   Date Value Ref Range Status   03/13/2020 23 22 - 29 mmol/L Final     Total CO2   Date Value Ref Range Status   07/23/2024 21 20 - 29 mmol/L Final     Calcium   Date Value Ref Range Status   07/23/2024 9.6 8.7 - 10.2 mg/dL Final   03/13/2020 9.8 8.6 - 10.0 mg/dL Final     ALT (SGPT)   Date Value Ref Range Status   07/23/2024 23 0 - 32 IU/L Final   03/13/2020 15 5 - 33 U/L Final     AST (SGOT)   Date Value Ref Range Status   07/23/2024 16 0 - 40 IU/L Final   03/13/2020 15 5 - 32 U/L Final      WBC   Date Value Ref Range Status   07/23/2024 6.9 3.4 - 10.8 x10E3/uL Final   03/13/2020 10.2 4.8 - 10.8 K/uL Final     Hematocrit   Date Value Ref Range Status   07/23/2024 39.8 34.0 - 46.6 % Final   03/13/2020 39.3 37.0 - 47.0 % Final     Platelets   Date Value Ref Range Status   07/23/2024 267 150 - 450 x10E3/uL Final   03/13/2020 266 130 - 400 K/uL Final     Triglycerides   Date Value Ref Range Status   07/23/2024 170 (H) 0 - 149 mg/dL Final   07/26/2018 113 0 - 149 mg/dL Final     HDL Cholesterol   Date Value Ref Range Status   07/23/2024 69 >39 mg/dL Final   07/26/2018 66 65 - 121 mg/dL Final     Comment:     VALUES>60 MG/DL ARE ASSOCIATED WITH A DECREASED RISK OF  ATHEROSCLEROTIC CARDIOVASCULAR DISEASE     LDL Cholesterol    Date Value Ref Range Status   07/26/2018 66 <100 mg/dL Final     Comment:     <100 MG/DL=OPITIMAL    100-129 MG/DL=DESIRABLE    130-159 MG/DL BORDERLINE=INCREASED RISK OF ATHEROSCLEROTIC  CARDIOVASCULAR DISEASE    > OR = 160 MG/DL=ASSOCIATED WITH AN INCREASE RISK OF  ATHEROSCLEROTIC CARDIOVASCULAR DISEASE     LDL Chol Calc (NIH)   Date Value Ref Range Status   07/23/2024 131 (H) 0 - 99 mg/dL Final     Hemoglobin A1C   Date Value Ref Range Status   07/23/2024 5.4 4.8 - 5.6 % Final     Comment:              Prediabetes: 5.7 - 6.4           Diabetes: >6.4           Glycemic control for adults with diabetes: <7.0     04/04/2023 5.4 % Final         Assessment / Plan     Assessment/Plan:    1. Bilateral hand pain  - EMG & Nerve Conduction Test; Future    2. Numbness and tingling in both hands  - EMG & Nerve Conduction Test; Future    Diagnoses and all orders for this visit:    1. Bilateral hand pain (Primary)  -     EMG & Nerve Conduction Test; Future    2. Numbness and tingling in both hands  -     EMG & Nerve Conduction Test; Future        Assessment & Plan  1. Suspected carpal tunnel syndrome.  She reports numbness and tingling in her thumb, index, and middle fingers of both hands,  along with reduced  strength and a sensation of pressure. Symptoms have been progressively worsening over the past month, starting with the left hand. She has difficulty gripping objects and experiences a burning sensation in her hands at night. There is no neck pain, forearm pain, or tremors reported. Physical examination reveals reduced  strength. She is advised to continue using compression gloves until the EMG is conducted. The use of braces was discussed, but she prefers to stick with compression gloves for now. An EMG or nerve conduction study will be ordered to assess the nerves in her hands and forearms to confirm the diagnosis.    No follow-ups on file. unless patient needs to be seen sooner or acute issues arise.      I have discussed the patient results/orders and and plan/recommendation with them at today's visit.    Patient or patient representative verbalized consent for the use of Ambient Listening during the visit with  TIAGO Salguero for chart documentation. 12/12/2024  09:19 CST  TIAGO Salguero   12/11/2024

## 2024-12-15 DIAGNOSIS — F33.1 MODERATE EPISODE OF RECURRENT MAJOR DEPRESSIVE DISORDER: ICD-10-CM

## 2024-12-16 ENCOUNTER — OFFICE VISIT (OUTPATIENT)
Dept: INTERNAL MEDICINE | Facility: CLINIC | Age: 29
End: 2024-12-16
Payer: COMMERCIAL

## 2024-12-16 VITALS
HEIGHT: 64 IN | OXYGEN SATURATION: 98 % | SYSTOLIC BLOOD PRESSURE: 129 MMHG | HEART RATE: 94 BPM | WEIGHT: 263 LBS | BODY MASS INDEX: 44.9 KG/M2 | DIASTOLIC BLOOD PRESSURE: 85 MMHG | TEMPERATURE: 97.1 F

## 2024-12-16 DIAGNOSIS — H66.001 ACUTE SUPPURATIVE OTITIS MEDIA OF RIGHT EAR WITHOUT SPONTANEOUS RUPTURE OF TYMPANIC MEMBRANE, RECURRENCE NOT SPECIFIED: ICD-10-CM

## 2024-12-16 DIAGNOSIS — J01.40 ACUTE PANSINUSITIS, RECURRENCE NOT SPECIFIED: Primary | ICD-10-CM

## 2024-12-16 PROCEDURE — 99213 OFFICE O/P EST LOW 20 MIN: CPT | Performed by: NURSE PRACTITIONER

## 2024-12-16 RX ORDER — METHYLPREDNISOLONE 4 MG/1
TABLET ORAL
Qty: 21 TABLET | Refills: 0 | Status: SHIPPED | OUTPATIENT
Start: 2024-12-16

## 2024-12-16 NOTE — TELEPHONE ENCOUNTER
Rx Refill Note  Requested Prescriptions     Pending Prescriptions Disp Refills    sertraline (ZOLOFT) 50 MG tablet [Pharmacy Med Name: SERTRALINE HCL 50 MG TABLET] 90 tablet 1     Sig: TAKE 1 TABLET BY MOUTH EVERY DAY      Last office visit with prescribing clinician: 7/23/2024   Last telemedicine visit with prescribing clinician: Visit date not found   Next office visit with prescribing clinician: 7/24/2025                         Would you like a call back once the refill request has been completed: [] Yes [] No    If the office needs to give you a call back, can they leave a voicemail: [] Yes [] No    Ana Edwards RN  12/16/24, 07:08 CST

## 2024-12-16 NOTE — PROGRESS NOTES
Subjective     Chief Complaint   Patient presents with    Earache     Yesterday pt complained of right ear pain, mother looked in side her ear and it was red, today both ears are red and painful. No drainage or any feeling of sickness. Pain to the touch, pt has not tried ear drops.       History of Present Illness  The patient is a 29-year-old female who presents for evaluation of right ear pain.    She reports the onset of right ear pain last night, accompanied by a sensation of pressure in her cheeks. She has not experienced any nasal discharge or fever. Additionally, she mentions feeling hot and sweaty.    ALLERGIES  The patient has no known allergies.      Patient's PMR from outside medical facility reviewed and noted.    Otherwise complete ROS reviewed and negative except as mentioned in the HPI.    Past Medical History:   Past Medical History:   Diagnosis Date    ADHD (attention deficit hyperactivity disorder)     Anxiety     Hyperlipidemia     Hypertension     Hypothyroidism      Past Surgical History:  Past Surgical History:   Procedure Laterality Date    LAPAROSCOPIC OVARIAN CYSTECTOMY       Social History:  reports that she has never smoked. She has never used smokeless tobacco. She reports that she does not drink alcohol and does not use drugs.    Family History: family history includes Depression in her mother; Diabetes in her mother; Heart disease in her maternal grandfather; Hyperlipidemia in her mother; No Known Problems in her father.       Allergies:  No Known Allergies  Medications:  Prior to Admission medications    Medication Sig Start Date End Date Taking? Authorizing Provider   albuterol sulfate  (90 Base) MCG/ACT inhaler Inhale 2 puffs Every 4 (Four) Hours As Needed for Wheezing. 3/19/24  Yes Tanya Gutrhie APRN   atomoxetine (STRATTERA) 40 MG capsule TAKE 1 CAPSULE BY MOUTH EVERY DAY 7/2/24  Yes Tanya Guthrie APRN   atorvastatin (LIPITOR) 40 MG tablet Take  "1 tablet by mouth Daily. 1/12/22  Yes ProviderCharo MD   fluticasone (FLONASE) 50 MCG/ACT nasal spray 2 sprays. 9/22/21  Yes Charo Alcaraz MD   hydroCHLOROthiazide 12.5 MG tablet TAKE 1 TABLET BY MOUTH EVERY DAY 6/25/24  Yes Tanya Guthrie APRN   levothyroxine (SYNTHROID, LEVOTHROID) 50 MCG tablet TAKE 1 TABLET BY MOUTH EVERY DAY 6/25/24  Yes Tanya Guthrie APRN   metFORMIN ER (GLUCOPHAGE-XR) 500 MG 24 hr tablet TAKE 2 TABLETS BY MOUTH DAILY WITH BREAKFAST. 6/25/24  Yes Tanya Guthrie APRN   metoprolol succinate XL (TOPROL-XL) 100 MG 24 hr tablet TAKE 1 TABLET BY MOUTH EVERY DAY 5/28/24  Yes Jose Cuellar MD   Nortrel 7/7/7 0.5/0.75/1-35 MG-MCG per tablet TAKE 1 TABLET BY MOUTH EVERY DAY 4/19/24  Yes Familia Edwards APRN   pantoprazole (PROTONIX) 40 MG EC tablet TAKE 1 TABLET BY MOUTH EVERY DAY 6/26/24  Yes Tanya Guthrie APRN   Semaglutide-Weight Management (Wegovy) 2.4 MG/0.75ML solution auto-injector Inject 2.4 mg under the skin into the appropriate area as directed 1 (One) Time Per Week. 3/8/24  Yes Tanya Guthrie APRN   sertraline (ZOLOFT) 50 MG tablet TAKE 1 TABLET BY MOUTH EVERY DAY 12/16/24  Yes Tanya Guthrie APRN   spironolactone (ALDACTONE) 25 MG tablet TAKE 1 TABLET BY MOUTH TWICE A DAY 6/25/24  Yes Tanya Guthrie APRN   amoxicillin-clavulanate (AUGMENTIN) 875-125 MG per tablet Take 1 tablet by mouth 2 (Two) Times a Day. 12/16/24   Tanya Guthrie APRN   methylPREDNISolone (MEDROL) 4 MG dose pack Take as directed on package instructions. 12/16/24   Tanya Guthrie APRN   sertraline (ZOLOFT) 50 MG tablet TAKE 1 TABLET BY MOUTH EVERY DAY 6/25/24 12/16/24  Tanya Guthrie APRN       Objective     Vital Signs: /85 (BP Location: Left arm, Patient Position: Sitting, Cuff Size: Large Adult)   Pulse 94   Temp 97.1 °F (36.2 °C) (Infrared)   Ht 162.6 cm (64.02\")   Wt 119 kg (263 " lb)   SpO2 98%   BMI 45.12 kg/m²     Physical Exam  The right ear drum is not red. There is yellowish discharge in the back of the throat, likely coming down from the sinuses.    Physical Exam  Vitals reviewed.   Constitutional:       Appearance: She is well-developed. She is obese.   HENT:      Head: Normocephalic and atraumatic.      Right Ear: Tympanic membrane is erythematous and bulging.      Left Ear: Tympanic membrane is bulging.      Ears:      Comments: Maxilary and frontal sinus tenderness.   Eyes:      Pupils: Pupils are equal, round, and reactive to light.   Neck:      Vascular: No JVD.   Cardiovascular:      Rate and Rhythm: Normal rate and regular rhythm.   Pulmonary:      Effort: Pulmonary effort is normal.      Breath sounds: Normal breath sounds.   Abdominal:      General: Bowel sounds are normal.      Palpations: Abdomen is soft.   Musculoskeletal:         General: No deformity.      Cervical back: Normal range of motion and neck supple.   Lymphadenopathy:      Cervical: No cervical adenopathy.   Skin:     General: Skin is warm and dry.   Neurological:      Mental Status: She is alert and oriented to person, place, and time.   Psychiatric:         Behavior: Behavior normal.         Thought Content: Thought content normal.         Judgment: Judgment normal.       Results Reviewed:  Glucose   Date Value Ref Range Status   07/23/2024 83 70 - 99 mg/dL Final   03/13/2020 82 74 - 109 mg/dL Final     BUN   Date Value Ref Range Status   07/23/2024 12 6 - 20 mg/dL Final   03/13/2020 16 6 - 20 mg/dL Final     Creatinine   Date Value Ref Range Status   07/23/2024 0.76 0.57 - 1.00 mg/dL Final   03/13/2020 0.7 0.5 - 0.9 mg/dL Final     Sodium   Date Value Ref Range Status   07/23/2024 137 134 - 144 mmol/L Final   03/13/2020 139 136 - 145 mmol/L Final     Potassium   Date Value Ref Range Status   07/23/2024 4.2 3.5 - 5.2 mmol/L Final   03/13/2020 4.1 3.5 - 5.0 mmol/L Final     Chloride   Date Value Ref Range  Status   07/23/2024 98 96 - 106 mmol/L Final   03/13/2020 100 98 - 111 mmol/L Final     CO2   Date Value Ref Range Status   03/13/2020 23 22 - 29 mmol/L Final     Total CO2   Date Value Ref Range Status   07/23/2024 21 20 - 29 mmol/L Final     Calcium   Date Value Ref Range Status   07/23/2024 9.6 8.7 - 10.2 mg/dL Final   03/13/2020 9.8 8.6 - 10.0 mg/dL Final     ALT (SGPT)   Date Value Ref Range Status   07/23/2024 23 0 - 32 IU/L Final   03/13/2020 15 5 - 33 U/L Final     AST (SGOT)   Date Value Ref Range Status   07/23/2024 16 0 - 40 IU/L Final   03/13/2020 15 5 - 32 U/L Final     WBC   Date Value Ref Range Status   07/23/2024 6.9 3.4 - 10.8 x10E3/uL Final   03/13/2020 10.2 4.8 - 10.8 K/uL Final     Hematocrit   Date Value Ref Range Status   07/23/2024 39.8 34.0 - 46.6 % Final   03/13/2020 39.3 37.0 - 47.0 % Final     Platelets   Date Value Ref Range Status   07/23/2024 267 150 - 450 x10E3/uL Final   03/13/2020 266 130 - 400 K/uL Final     Triglycerides   Date Value Ref Range Status   07/23/2024 170 (H) 0 - 149 mg/dL Final   07/26/2018 113 0 - 149 mg/dL Final     HDL Cholesterol   Date Value Ref Range Status   07/23/2024 69 >39 mg/dL Final   07/26/2018 66 65 - 121 mg/dL Final     Comment:     VALUES>60 MG/DL ARE ASSOCIATED WITH A DECREASED RISK OF  ATHEROSCLEROTIC CARDIOVASCULAR DISEASE     LDL Cholesterol    Date Value Ref Range Status   07/26/2018 66 <100 mg/dL Final     Comment:     <100 MG/DL=OPITIMAL    100-129 MG/DL=DESIRABLE    130-159 MG/DL BORDERLINE=INCREASED RISK OF ATHEROSCLEROTIC  CARDIOVASCULAR DISEASE    > OR = 160 MG/DL=ASSOCIATED WITH AN INCREASE RISK OF  ATHEROSCLEROTIC CARDIOVASCULAR DISEASE     LDL Chol Calc (NIH)   Date Value Ref Range Status   07/23/2024 131 (H) 0 - 99 mg/dL Final     Hemoglobin A1C   Date Value Ref Range Status   07/23/2024 5.4 4.8 - 5.6 % Final     Comment:              Prediabetes: 5.7 - 6.4           Diabetes: >6.4           Glycemic control for adults with diabetes:  <7.0     04/04/2023 5.4 % Final       Results        Assessment / Plan     Assessment/Plan:  Diagnoses and all orders for this visit:    1. Acute pansinusitis, recurrence not specified (Primary)  -     amoxicillin-clavulanate (AUGMENTIN) 875-125 MG per tablet; Take 1 tablet by mouth 2 (Two) Times a Day.  Dispense: 20 tablet; Refill: 0  -     methylPREDNISolone (MEDROL) 4 MG dose pack; Take as directed on package instructions.  Dispense: 21 tablet; Refill: 0    2. Acute suppurative otitis media of right ear without spontaneous rupture of tympanic membrane, recurrence not specified  -     amoxicillin-clavulanate (AUGMENTIN) 875-125 MG per tablet; Take 1 tablet by mouth 2 (Two) Times a Day.  Dispense: 20 tablet; Refill: 0  -     methylPREDNISolone (MEDROL) 4 MG dose pack; Take as directed on package instructions.  Dispense: 21 tablet; Refill: 0      Assessment & Plan  1. Right otalgia.  The right ear is notably erythematous and inflamed. The eardrum is intact without any perforation. A prescription for Augmentin will be sent to [facility]. She is advised to use over-the-counter nasal steroids such as Flonase, Nasacort, or Nasonex to alleviate sinus congestion. If a cough develops, Mucinex can be used. If there are any changes in her condition, she should notify the clinic promptly.    No follow-ups on file. unless patient needs to be seen sooner or acute issues arise.    Code Status: Full.   Patient or patient representative verbalized consent for the use of Ambient Listening during the visit with  TIAGO Clarke for chart documentation. 12/16/2024  15:46 CST  I have discussed the patient results/orders and and plan/recommendation with them at today's visit.      Signed by:    TIAGO Clarke Date: 12/16/24

## 2024-12-23 ENCOUNTER — HOSPITAL ENCOUNTER (OUTPATIENT)
Dept: NEUROLOGY | Facility: HOSPITAL | Age: 29
Discharge: HOME | End: 2024-12-23
Payer: COMMERCIAL

## 2024-12-23 DIAGNOSIS — M79.641 BILATERAL HAND PAIN: ICD-10-CM

## 2024-12-23 DIAGNOSIS — R20.2 NUMBNESS AND TINGLING IN BOTH HANDS: ICD-10-CM

## 2024-12-23 DIAGNOSIS — M79.642 BILATERAL HAND PAIN: ICD-10-CM

## 2024-12-23 DIAGNOSIS — R20.0 NUMBNESS AND TINGLING IN BOTH HANDS: ICD-10-CM

## 2024-12-23 PROCEDURE — 95911 NRV CNDJ TEST 9-10 STUDIES: CPT

## 2024-12-23 PROCEDURE — 95886 MUSC TEST DONE W/N TEST COMP: CPT

## 2024-12-26 DIAGNOSIS — R94.131 ABNORMAL EMG: Primary | ICD-10-CM

## 2025-01-10 DIAGNOSIS — E66.01 MORBID (SEVERE) OBESITY DUE TO EXCESS CALORIES: ICD-10-CM

## 2025-01-10 DIAGNOSIS — R73.03 PREDIABETES: ICD-10-CM

## 2025-01-13 RX ORDER — METFORMIN HYDROCHLORIDE 500 MG/1
1000 TABLET, EXTENDED RELEASE ORAL
Qty: 180 TABLET | Refills: 1 | Status: SHIPPED | OUTPATIENT
Start: 2025-01-13

## 2025-01-16 NOTE — PROGRESS NOTES
of Food in the Last Year: Never true      Social History     Occupational History    Not on file   Tobacco Use    Smoking status: Never    Smokeless tobacco: Never   Substance and Sexual Activity    Alcohol use: No    Drug use: Never    Sexual activity: Never        Tobacco Use      Smoking status: Never      Smokeless tobacco: Never     Family History   Problem Relation Age of Onset    Diabetes Mother     Diabetes Paternal Grandmother         Medications  Current Outpatient Medications   Medication Sig Dispense Refill    WEGOVY 2.4 MG/0.75ML SOAJ SC injection INJECT 2.4 MG UNDER THE SKIN INTO THE APPROPRIATE AREA AS DIRECTED 1 (ONE) TIME PER WEEK.      atorvastatin (LIPITOR) 40 MG tablet TAKE 1 TABLET BY MOUTH EVERY DAY AT NIGHT 30 tablet 0    sertraline (ZOLOFT) 25 MG tablet Take 1 tablet by mouth daily (Patient taking differently: Take 2 tablets by mouth daily) 30 tablet 0    hydroCHLOROthiazide (HYDRODIURIL) 12.5 MG tablet TAKE 1 TABLET BY MOUTH EVERY DAY 90 tablet 3    spironolactone (ALDACTONE) 25 MG tablet Take 1 tablet by mouth 2 times daily 180 tablet 3    atomoxetine (STRATTERA) 40 MG capsule Take 1 capsule by mouth daily 90 capsule 3    levothyroxine (SYNTHROID) 50 MCG tablet Take 1 tablet by mouth Daily 90 tablet 3    NORTREL 7/7/7 0.5/0.75/1-35 MG-MCG per tablet TAKE 1 TABLET BY MOUTH EVERY DAY 84 tablet 3    metoprolol succinate (TOPROL XL) 100 MG extended release tablet Take 1 tablet by mouth daily 90 tablet 3    pantoprazole (PROTONIX) 40 MG tablet Take 1 tablet by mouth daily TAKE 1 TABLET BY MOUTH EVERY DAY 90 tablet 3    loratadine (EQ ALLERGY RELIEF) 10 MG tablet Take 1 tablet by mouth daily 30 tablet 3    meloxicam (MOBIC) 15 MG tablet Take 1 tablet by mouth daily (Patient not taking: Reported on 1/17/2025) 90 tablet 3    fluticasone (FLONASE) 50 MCG/ACT nasal spray 2 sprays by Each Nostril route daily (Patient not taking: Reported on 1/17/2025) 16 g 5     No current facility-administered

## 2025-01-17 ENCOUNTER — OFFICE VISIT (OUTPATIENT)
Age: 30
End: 2025-01-17

## 2025-01-17 VITALS — HEIGHT: 64 IN | WEIGHT: 216 LBS | BODY MASS INDEX: 36.88 KG/M2

## 2025-01-17 DIAGNOSIS — G56.02 LEFT CARPAL TUNNEL SYNDROME: ICD-10-CM

## 2025-01-17 DIAGNOSIS — G56.01 RIGHT CARPAL TUNNEL SYNDROME: Primary | ICD-10-CM

## 2025-01-17 RX ORDER — SEMAGLUTIDE 2.4 MG/.75ML
INJECTION, SOLUTION SUBCUTANEOUS
COMMUNITY
Start: 2024-03-08

## 2025-01-21 ENCOUNTER — TELEPHONE (OUTPATIENT)
Age: 30
End: 2025-01-21

## 2025-01-21 NOTE — TELEPHONE ENCOUNTER
Called pt to schedule her surgery with Dr. Chambers. She was scheduled for staged CTR at [facility] on 02/19 and 03/05. She understood and agreed and at this time all questions were answered.

## 2025-01-22 ENCOUNTER — PRIOR AUTHORIZATION (OUTPATIENT)
Dept: INTERNAL MEDICINE | Facility: CLINIC | Age: 30
End: 2025-01-22
Payer: COMMERCIAL

## 2025-01-22 NOTE — TELEPHONE ENCOUNTER
Dayana Leavitt (Isaacs: [id number])  Rx #: [id number]  Wegovy 2.4MG/0.75ML auto-injectors  Your prior authorization for Wegovy has been approved!  More Info  Personalized support and financial assistance may be available through the 's [employer] program. For more information, and to see program requirements, click on the More Info button to the right.    Message from plan: Your PA request has been approved. Additional information will be provided in the approval communication. (Message 1147). Authorization Expiration Date: August 22, 2025.

## 2025-01-24 DIAGNOSIS — G56.01 RIGHT CARPAL TUNNEL SYNDROME: Primary | ICD-10-CM

## 2025-01-26 RX ORDER — OXYCODONE AND ACETAMINOPHEN 5; 325 MG/1; MG/1
1 TABLET ORAL EVERY 6 HOURS PRN
Qty: 5 TABLET | Refills: 0 | Status: SHIPPED | OUTPATIENT
Start: 2025-02-18 | End: 2025-02-19

## 2025-01-26 RX ORDER — OXYCODONE AND ACETAMINOPHEN 5; 325 MG/1; MG/1
1 TABLET ORAL EVERY 6 HOURS PRN
Qty: 5 TABLET | Refills: 0 | Status: SHIPPED | OUTPATIENT
Start: 2025-03-04 | End: 2025-03-05

## 2025-02-12 ENCOUNTER — PRE-ADMISSION TESTING (OUTPATIENT)
Dept: PREADMISSION TESTING | Facility: HOSPITAL | Age: 30
End: 2025-02-12
Payer: COMMERCIAL

## 2025-02-12 VITALS
BODY MASS INDEX: 44.47 KG/M2 | RESPIRATION RATE: 20 BRPM | SYSTOLIC BLOOD PRESSURE: 145 MMHG | OXYGEN SATURATION: 99 % | HEART RATE: 100 BPM | HEIGHT: 66 IN | WEIGHT: 276.68 LBS | DIASTOLIC BLOOD PRESSURE: 101 MMHG

## 2025-02-12 LAB
ANION GAP SERPL CALCULATED.3IONS-SCNC: 11 MMOL/L (ref 5–15)
BUN SERPL-MCNC: 12 MG/DL (ref 6–20)
BUN/CREAT SERPL: 21.4 (ref 7–25)
CALCIUM SPEC-SCNC: 9.2 MG/DL (ref 8.6–10.5)
CHLORIDE SERPL-SCNC: 99 MMOL/L (ref 98–107)
CO2 SERPL-SCNC: 23 MMOL/L (ref 22–29)
CREAT SERPL-MCNC: 0.56 MG/DL (ref 0.57–1)
DEPRECATED RDW RBC AUTO: 40.4 FL (ref 37–54)
EGFRCR SERPLBLD CKD-EPI 2021: 126.1 ML/MIN/1.73
ERYTHROCYTE [DISTWIDTH] IN BLOOD BY AUTOMATED COUNT: 13.4 % (ref 12.3–15.4)
GLUCOSE SERPL-MCNC: 98 MG/DL (ref 65–99)
HCT VFR BLD AUTO: 36.6 % (ref 34–46.6)
HGB BLD-MCNC: 12.1 G/DL (ref 12–15.9)
MCH RBC QN AUTO: 27.3 PG (ref 26.6–33)
MCHC RBC AUTO-ENTMCNC: 33.1 G/DL (ref 31.5–35.7)
MCV RBC AUTO: 82.6 FL (ref 79–97)
PLATELET # BLD AUTO: 247 10*3/MM3 (ref 140–450)
PMV BLD AUTO: 10.2 FL (ref 6–12)
POTASSIUM SERPL-SCNC: 4.5 MMOL/L (ref 3.5–5.2)
RBC # BLD AUTO: 4.43 10*6/MM3 (ref 3.77–5.28)
SODIUM SERPL-SCNC: 133 MMOL/L (ref 136–145)
WBC NRBC COR # BLD AUTO: 8.74 10*3/MM3 (ref 3.4–10.8)

## 2025-02-12 PROCEDURE — 85027 COMPLETE CBC AUTOMATED: CPT

## 2025-02-12 PROCEDURE — 80048 BASIC METABOLIC PNL TOTAL CA: CPT

## 2025-02-12 PROCEDURE — 36415 COLL VENOUS BLD VENIPUNCTURE: CPT

## 2025-02-12 NOTE — DISCHARGE INSTRUCTIONS
Preparing for Surgery  Follow these instructions before the procedure:  Several days or weeks before your procedure  Medication(s) you need to stop   _ONE WEEK______ days/week prior to surgery: WEGOVY      Ask your health care provider about:  Changing or stopping your regular medicines. This is especially important if you are taking diabetes medicines or blood thinners.  Taking medicines such as aspirin and ibuprofen. These medicines can thin your blood. Do not take these medicines unless your health care provider tells you to take them.  Taking over-the-counter medicines, vitamins, herbs, and supplements.    Contact your surgeon if you:  Develop a fever of more than 100.4°F (38°C) or other feelings of illness during the 48 hours before your surgery.  Have symptoms that get worse.  Have questions or concerns about your surgery.  If you are going home the same day of your surgery you will need to arrange for a responsible adult, age 18 years old or older, to drive you home from the hospital and stay with you for 24 hours. Verification of the  will be made prior to any procedure requiring sedation. You may not go home in a taxi or any form of public transportation by yourself.     Day before your procedure  24 hours before your procedure DO NOT drink alcoholic beverages or smoke.  24 hours before your procedure STOP taking Erectile Dysfunction medication (i.e.,Cialis, Viagra)   You may be asked to shower with a germ-killing soap.  Day of your procedure   You may take the following medication(s) the morning of surgery with a sip of water: METOPROLOL, PROTONIX      8 hours before your scheduled arrival time, STOP all food, any dairy products, and full liquids. This includes hard candy, chewing gum or mints. This is extremely important to prevent serious complications.     Up to 2 hours before your scheduled arrival time, you may have clear liquids no cream, powder, or pulp of any kind. Safe options are water,  black coffee, plain tea, soda, Gatorade/Powerade, clear broth, apple juice.    2 hours before your scheduled arrival time, STOP drinking clear liquids.    You may need to take another shower with a germ-killing soap before you leave home in the morning. Do not use perfumes, colognes, or body lotions.  Wear comfortable loose-fitting clothing.  Remove all jewelry including body piercing and rings, dark colored nail polish, and make up prior to arrival at the hospital. Leave all valuables at home.   Bring your hearing aids if you rely on them.  Do not wear contact lenses. If you wear eyeglasses remember to bring a case to store them in while you are in surgery.  Do not use denture adhesives since you will be asked to remove them during your surgery.    You do not need to bring your home medications into the hospital.   Bring your sleep apnea device with you on the day of your surgery (if this applies to you).  If you have an Inspire implant for sleep apnea, please bring the remote with you on the day of surgery.  If you wear portable oxygen, bring it with you.   If you are staying overnight, you may bring a bag of items you may need such as slippers, robe and a change of clothes for your discharge. You may want to leave these items in the car until you are ready for them since your family will take your belongings when you leave the pre-operative area.  Arrive at the hospital as scheduled by the office. You will be asked to arrive 2 hours prior to your surgery time in order to prepare for your procedure.  When you arrive at the hospital  Go to the registration desk located at the main entrance of the hospital.  After registration is completed, you will be given a beeper and a sticker sheet. Take the stickers to Outpatient Surgery and place in the tray at the end of the desk to notify the staff that you have arrived and registered.   Return to the lobby to wait. You are not always called back according to the time of  arrival but rather the time your doctor will be ready.  When your beeper lights up and vibrates proceed through the double doors, under the stairs, and a member of the Outpatient Surgery staff will escort you to your preoperative room.   How to Use Chlorhexidine Before Surgery  Chlorhexidine gluconate (CHG) is a germ-killing (antiseptic) solution that is used to clean the skin. It can get rid of the bacteria that normally live on the skin and can keep them away for about 24 hours. To clean your skin with CHG, you may be given:  A CHG solution to use in the shower or as part of a sponge bath.  A prepackaged cloth that contains CHG.  Cleaning your skin with CHG may help lower the risk for infection:  While you are staying in the intensive care unit of the hospital.  If you have a vascular access, such as a central line, to provide short-term or long-term access to your veins.  If you have a catheter to drain urine from your bladder.  If you are on a ventilator. A ventilator is a machine that helps you breathe by moving air in and out of your lungs.  After surgery.  What are the risks?  Risks of using CHG include:  A skin reaction.  Hearing loss, if CHG gets in your ears and you have a perforated eardrum.  Eye injury, if CHG gets in your eyes and is not rinsed out.  The CHG product catching fire.  Make sure that you avoid smoking and flames after applying CHG to your skin.  Do not use CHG:  If you have a chlorhexidine allergy or have previously reacted to chlorhexidine.  On babies younger than 2 months of age.  How to use CHG solution  Use CHG only as told by your health care provider, and follow the instructions on the label.  Use the full amount of CHG as directed. Usually, this is one bottle.  During a shower    Follow these steps when using CHG solution during a shower (unless your health care provider gives you different instructions):  Start the shower.  Use your normal soap and shampoo to wash your face and  hair.  Turn off the shower or move out of the shower stream.  Pour the CHG onto a clean washcloth. Do not use any type of brush or rough-edged sponge.  Starting at your neck, lather your body down to your toes. Make sure you follow these instructions:  If you will be having surgery, pay special attention to the part of your body where you will be having surgery. Scrub this area for at least 1 minute.  Do not use CHG on your head or face. If the solution gets into your ears or eyes, rinse them well with water.  Avoid your genital area.  Avoid any areas of skin that have broken skin, cuts, or scrapes.  Scrub your back and under your arms. Make sure to wash skin folds.  Let the lather sit on your skin for 1-2 minutes or as long as told by your health care provider.  Thoroughly rinse your entire body in the shower. Make sure that all body creases and crevices are rinsed well.  Dry off with a clean towel. Do not put any substances on your body afterward--such as powder, lotion, or perfume--unless you are told to do so by your health care provider. Only use lotions that are recommended by the .  Put on clean clothes or pajamas.  If it is the night before your surgery, sleep in clean sheets.     During a sponge bath  Follow these steps when using CHG solution during a sponge bath (unless your health care provider gives you different instructions):  Use your normal soap and shampoo to wash your face and hair.  Pour the CHG onto a clean washcloth.  Starting at your neck, lather your body down to your toes. Make sure you follow these instructions:  If you will be having surgery, pay special attention to the part of your body where you will be having surgery. Scrub this area for at least 1 minute.  Do not use CHG on your head or face. If the solution gets into your ears or eyes, rinse them well with water.  Avoid your genital area.  Avoid any areas of skin that have broken skin, cuts, or scrapes.  Scrub your back  and under your arms. Make sure to wash skin folds.  Let the lather sit on your skin for 1-2 minutes or as long as told by your health care provider.  Using a different clean, wet washcloth, thoroughly rinse your entire body. Make sure that all body creases and crevices are rinsed well.  Dry off with a clean towel. Do not put any substances on your body afterward--such as powder, lotion, or perfume--unless you are told to do so by your health care provider. Only use lotions that are recommended by the .  Put on clean clothes or pajamas.  If it is the night before your surgery, sleep in clean sheets.  How to use CHG prepackaged cloths  Only use CHG cloths as told by your health care provider, and follow the instructions on the label.  Use the CHG cloth on clean, dry skin.  Do not use the CHG cloth on your head or face unless your health care provider tells you to.  When washing with the CHG cloth:  Avoid your genital area.  Avoid any areas of skin that have broken skin, cuts, or scrapes.  Before surgery    Follow these steps when using a CHG cloth to clean before surgery (unless your health care provider gives you different instructions):  Using the CHG cloth, vigorously scrub the part of your body where you will be having surgery. Scrub using a back-and-forth motion for 3 minutes. The area on your body should be completely wet with CHG when you are done scrubbing.  Do not rinse. Discard the cloth and let the area air-dry. Do not put any substances on the area afterward, such as powder, lotion, or perfume.  Put on clean clothes or pajamas.  If it is the night before your surgery, sleep in clean sheets.     For general bathing  Follow these steps when using CHG cloths for general bathing (unless your health care provider gives you different instructions).  Use a separate CHG cloth for each area of your body. Make sure you wash between any folds of skin and between your fingers and toes. Wash your body in  the following order, switching to a new cloth after each step:  The front of your neck, shoulders, and chest.  Both of your arms, under your arms, and your hands.  Your stomach and groin area, avoiding the genitals.  Your right leg and foot.  Your left leg and foot.  The back of your neck, your back, and your buttocks.  Do not rinse. Discard the cloth and let the area air-dry. Do not put any substances on your body afterward--such as powder, lotion, or perfume--unless you are told to do so by your health care provider. Only use lotions that are recommended by the .  Put on clean clothes or pajamas.  Contact a health care provider if:  Your skin gets irritated after scrubbing.  You have questions about using your solution or cloth.  You swallow any chlorhexidine. Call your local poison control center (1-155.472.9891 in the [place]  Get help right away if:  Your eyes itch badly, or they become very red or swollen.  Your skin itches badly and is red or swollen.  Your hearing changes.  You have trouble seeing.  You have swelling or tingling in your mouth or throat.  You have trouble breathing.  These symptoms may represent a serious problem that is an emergency. Do not wait to see if the symptoms will go away. Get medical help right away. Call your local emergency services (099 in the [place] Do not drive yourself to the hospital.  Summary  Chlorhexidine gluconate (CHG) is a germ-killing (antiseptic) solution that is used to clean the skin. Cleaning your skin with CHG may help to lower your risk for infection.  You may be given CHG to use for bathing. It may be in a bottle or in a prepackaged cloth to use on your skin. Carefully follow your health care provider's instructions and the instructions on the product label.  Do not use CHG if you have a chlorhexidine allergy.  Contact your health care provider if your skin gets irritated after scrubbing.  This information is not intended to replace advice given to  you by your health care provider. Make sure you discuss any questions you have with your health care provider.  Document Revised: 04/17/2023 Document Reviewed: 02/28/2022  Elsevier Patient Education © 2023 [employer]

## 2025-02-19 ENCOUNTER — ANESTHESIA (OUTPATIENT)
Dept: PERIOP | Facility: HOSPITAL | Age: 30
End: 2025-02-19
Payer: COMMERCIAL

## 2025-02-19 ENCOUNTER — ANESTHESIA EVENT (OUTPATIENT)
Dept: PERIOP | Facility: HOSPITAL | Age: 30
End: 2025-02-19
Payer: COMMERCIAL

## 2025-02-19 ENCOUNTER — HOSPITAL ENCOUNTER (OUTPATIENT)
Facility: HOSPITAL | Age: 30
Setting detail: HOSPITAL OUTPATIENT SURGERY
Discharge: HOME | End: 2025-02-19
Attending: ORTHOPAEDIC SURGERY | Admitting: ORTHOPAEDIC SURGERY
Payer: COMMERCIAL

## 2025-02-19 VITALS
DIASTOLIC BLOOD PRESSURE: 54 MMHG | HEART RATE: 91 BPM | TEMPERATURE: 97.2 F | OXYGEN SATURATION: 98 % | RESPIRATION RATE: 16 BRPM | SYSTOLIC BLOOD PRESSURE: 102 MMHG

## 2025-02-19 PROBLEM — G56.03 BILATERAL CARPAL TUNNEL SYNDROME: Status: ACTIVE | Noted: 2025-02-19

## 2025-02-19 LAB — B-HCG UR QL: NEGATIVE

## 2025-02-19 PROCEDURE — 81025 URINE PREGNANCY TEST: CPT | Performed by: ORTHOPAEDIC SURGERY

## 2025-02-19 PROCEDURE — 25810000003 LACTATED RINGERS PER 1000 ML: Performed by: ORTHOPAEDIC SURGERY

## 2025-02-19 PROCEDURE — 25010000002 LIDOCAINE 1% - EPINEPHRINE 1:100000 1 %-1:100000 SOLUTION: Performed by: ORTHOPAEDIC SURGERY

## 2025-02-19 PROCEDURE — 25010000002 LIDOCAINE PF 2% 2 % SOLUTION: Performed by: NURSE ANESTHETIST, CERTIFIED REGISTERED

## 2025-02-19 PROCEDURE — 25010000002 PROPOFOL 10 MG/ML EMULSION: Performed by: NURSE ANESTHETIST, CERTIFIED REGISTERED

## 2025-02-19 PROCEDURE — 25010000002 CEFAZOLIN 3 G RECONSTITUTED SOLUTION 1 EACH VIAL: Performed by: ORTHOPAEDIC SURGERY

## 2025-02-19 PROCEDURE — 25010000002 FENTANYL CITRATE (PF) 100 MCG/2ML SOLUTION: Performed by: NURSE ANESTHETIST, CERTIFIED REGISTERED

## 2025-02-19 PROCEDURE — 25010000002 PROPOFOL 1000 MG/100ML EMULSION: Performed by: NURSE ANESTHETIST, CERTIFIED REGISTERED

## 2025-02-19 RX ORDER — SODIUM CHLORIDE 9 MG/ML
40 INJECTION, SOLUTION INTRAVENOUS AS NEEDED
Status: DISCONTINUED | OUTPATIENT
Start: 2025-02-19 | End: 2025-02-19 | Stop reason: HOSPADM

## 2025-02-19 RX ORDER — MAGNESIUM HYDROXIDE 1200 MG/15ML
LIQUID ORAL AS NEEDED
Status: DISCONTINUED | OUTPATIENT
Start: 2025-02-19 | End: 2025-02-19 | Stop reason: HOSPADM

## 2025-02-19 RX ORDER — ONDANSETRON 2 MG/ML
4 INJECTION INTRAMUSCULAR; INTRAVENOUS ONCE AS NEEDED
Status: DISCONTINUED | OUTPATIENT
Start: 2025-02-19 | End: 2025-02-19 | Stop reason: HOSPADM

## 2025-02-19 RX ORDER — SODIUM CHLORIDE 0.9 % (FLUSH) 0.9 %
3 SYRINGE (ML) INJECTION AS NEEDED
Status: DISCONTINUED | OUTPATIENT
Start: 2025-02-19 | End: 2025-02-19 | Stop reason: HOSPADM

## 2025-02-19 RX ORDER — IBUPROFEN 600 MG/1
600 TABLET, FILM COATED ORAL EVERY 6 HOURS PRN
Status: DISCONTINUED | OUTPATIENT
Start: 2025-02-19 | End: 2025-02-19 | Stop reason: HOSPADM

## 2025-02-19 RX ORDER — FENTANYL CITRATE 50 UG/ML
INJECTION, SOLUTION INTRAMUSCULAR; INTRAVENOUS AS NEEDED
Status: DISCONTINUED | OUTPATIENT
Start: 2025-02-19 | End: 2025-02-19 | Stop reason: SURG

## 2025-02-19 RX ORDER — NALOXONE HCL 0.4 MG/ML
0.4 VIAL (ML) INJECTION AS NEEDED
Status: DISCONTINUED | OUTPATIENT
Start: 2025-02-19 | End: 2025-02-19 | Stop reason: HOSPADM

## 2025-02-19 RX ORDER — LIDOCAINE HYDROCHLORIDE 10 MG/ML
0.5 INJECTION, SOLUTION EPIDURAL; INFILTRATION; INTRACAUDAL; PERINEURAL ONCE AS NEEDED
Status: DISCONTINUED | OUTPATIENT
Start: 2025-02-19 | End: 2025-02-19 | Stop reason: HOSPADM

## 2025-02-19 RX ORDER — SODIUM CHLORIDE 0.9 % (FLUSH) 0.9 %
3-10 SYRINGE (ML) INJECTION AS NEEDED
Status: DISCONTINUED | OUTPATIENT
Start: 2025-02-19 | End: 2025-02-19 | Stop reason: HOSPADM

## 2025-02-19 RX ORDER — LIDOCAINE HYDROCHLORIDE AND EPINEPHRINE 10; 10 MG/ML; UG/ML
INJECTION, SOLUTION INFILTRATION; PERINEURAL AS NEEDED
Status: DISCONTINUED | OUTPATIENT
Start: 2025-02-19 | End: 2025-02-19 | Stop reason: HOSPADM

## 2025-02-19 RX ORDER — SODIUM CHLORIDE 0.9 % (FLUSH) 0.9 %
3 SYRINGE (ML) INJECTION EVERY 12 HOURS SCHEDULED
Status: DISCONTINUED | OUTPATIENT
Start: 2025-02-19 | End: 2025-02-19 | Stop reason: HOSPADM

## 2025-02-19 RX ORDER — PROPOFOL 10 MG/ML
VIAL (ML) INTRAVENOUS AS NEEDED
Status: DISCONTINUED | OUTPATIENT
Start: 2025-02-19 | End: 2025-02-19 | Stop reason: SURG

## 2025-02-19 RX ORDER — LIDOCAINE HYDROCHLORIDE 20 MG/ML
INJECTION, SOLUTION EPIDURAL; INFILTRATION; INTRACAUDAL; PERINEURAL AS NEEDED
Status: DISCONTINUED | OUTPATIENT
Start: 2025-02-19 | End: 2025-02-19 | Stop reason: SURG

## 2025-02-19 RX ORDER — LABETALOL HYDROCHLORIDE 5 MG/ML
5 INJECTION, SOLUTION INTRAVENOUS
Status: DISCONTINUED | OUTPATIENT
Start: 2025-02-19 | End: 2025-02-19 | Stop reason: HOSPADM

## 2025-02-19 RX ORDER — HYDROCODONE BITARTRATE AND ACETAMINOPHEN 10; 325 MG/1; MG/1
1 TABLET ORAL EVERY 4 HOURS PRN
Status: DISCONTINUED | OUTPATIENT
Start: 2025-02-19 | End: 2025-02-19 | Stop reason: HOSPADM

## 2025-02-19 RX ORDER — PROPOFOL 10 MG/ML
INJECTION, EMULSION INTRAVENOUS CONTINUOUS PRN
Status: DISCONTINUED | OUTPATIENT
Start: 2025-02-19 | End: 2025-02-19 | Stop reason: SURG

## 2025-02-19 RX ORDER — MIDAZOLAM HYDROCHLORIDE 2 MG/2ML
1 INJECTION, SOLUTION INTRAMUSCULAR; INTRAVENOUS
Status: DISCONTINUED | OUTPATIENT
Start: 2025-02-19 | End: 2025-02-19 | Stop reason: HOSPADM

## 2025-02-19 RX ORDER — HYDROCODONE BITARTRATE AND ACETAMINOPHEN 5; 325 MG/1; MG/1
1 TABLET ORAL EVERY 4 HOURS PRN
Status: DISCONTINUED | OUTPATIENT
Start: 2025-02-19 | End: 2025-02-19 | Stop reason: HOSPADM

## 2025-02-19 RX ORDER — FLUMAZENIL 0.1 MG/ML
0.2 INJECTION INTRAVENOUS AS NEEDED
Status: DISCONTINUED | OUTPATIENT
Start: 2025-02-19 | End: 2025-02-19 | Stop reason: HOSPADM

## 2025-02-19 RX ORDER — SODIUM CHLORIDE, SODIUM LACTATE, POTASSIUM CHLORIDE, CALCIUM CHLORIDE 600; 310; 30; 20 MG/100ML; MG/100ML; MG/100ML; MG/100ML
1000 INJECTION, SOLUTION INTRAVENOUS CONTINUOUS
Status: DISCONTINUED | OUTPATIENT
Start: 2025-02-19 | End: 2025-02-19 | Stop reason: HOSPADM

## 2025-02-19 RX ORDER — FENTANYL CITRATE 50 UG/ML
50 INJECTION, SOLUTION INTRAMUSCULAR; INTRAVENOUS
Status: DISCONTINUED | OUTPATIENT
Start: 2025-02-19 | End: 2025-02-19 | Stop reason: HOSPADM

## 2025-02-19 RX ORDER — SODIUM CHLORIDE, SODIUM LACTATE, POTASSIUM CHLORIDE, CALCIUM CHLORIDE 600; 310; 30; 20 MG/100ML; MG/100ML; MG/100ML; MG/100ML
100 INJECTION, SOLUTION INTRAVENOUS CONTINUOUS
Status: DISCONTINUED | OUTPATIENT
Start: 2025-02-19 | End: 2025-02-19 | Stop reason: HOSPADM

## 2025-02-19 RX ADMIN — SODIUM CHLORIDE, POTASSIUM CHLORIDE, SODIUM LACTATE AND CALCIUM CHLORIDE 1000 ML: 600; 310; 30; 20 INJECTION, SOLUTION INTRAVENOUS at 07:06

## 2025-02-19 RX ADMIN — FENTANYL CITRATE 100 MCG: 50 INJECTION, SOLUTION INTRAMUSCULAR; INTRAVENOUS at 08:28

## 2025-02-19 RX ADMIN — PROPOFOL 50 MG: 10 INJECTION, EMULSION INTRAVENOUS at 08:33

## 2025-02-19 RX ADMIN — PROPOFOL 75 MCG/KG/MIN: 10 INJECTION, EMULSION INTRAVENOUS at 08:33

## 2025-02-19 RX ADMIN — LIDOCAINE HYDROCHLORIDE 50 MG: 20 INJECTION, SOLUTION EPIDURAL; INFILTRATION; INTRACAUDAL; PERINEURAL at 08:33

## 2025-02-19 RX ADMIN — SODIUM CHLORIDE 3000 MG: 900 INJECTION INTRAVENOUS at 08:34

## 2025-02-19 NOTE — H&P
[facility]   HISTORY AND PHYSICAL    Patient Name: Dayana Leavitt  : 1995  MRN: [id number]  Primary Care Physician:  Tanya Guthrie APRN  Date of admission: 2025      Subjective     Chief Complaint: Left hand numbness and tingling    History of Present Illness  Ms. Leavitt is a 29-year-old female who presents today for planned left open carpal tunnel release.  She reports no significant interval medical changes.  Symptoms remain stable.    Review of Systems   Complete review of systems was reviewed today and is unremarkable except for HPI.    Personal History     Past Medical History:   Diagnosis Date    ADHD (attention deficit hyperactivity disorder)     Anxiety     Asthma     HAD AS A CHILD, NO PROBLEMS AS ADULT    Carpal tunnel syndrome     BILAT    Elevated cholesterol     GERD (gastroesophageal reflux disease)     Hyperlipidemia     Hypertension     Hypothyroidism        Past Surgical History:   Procedure Laterality Date    LAPAROSCOPIC OVARIAN CYSTECTOMY         Family History   Problem Relation Age of Onset    Depression Mother     Diabetes Mother     Hyperlipidemia Mother     No Known Problems Father     Heart disease Maternal Grandfather        Social History     Socioeconomic History    Marital status: Single   Tobacco Use    Smoking status: Never    Smokeless tobacco: Never   Vaping Use    Vaping status: Never Used   Substance and Sexual Activity    Alcohol use: Never    Drug use: Never    Sexual activity: Never       Prior to Admission medications    Medication Sig Start Date End Date Taking? Authorizing Provider   albuterol sulfate  (90 Base) MCG/ACT inhaler Inhale 2 puffs Every 4 (Four) Hours As Needed for Wheezing. 3/19/24   Tanya Guthrie APRN   amoxicillin-clavulanate (AUGMENTIN) 875-125 MG per tablet Take 1 tablet by mouth 2 (Two) Times a Day.  Patient not taking: Reported on 24   Tanya Guthrie APRN   atomoxetine (STRATTERA) 40 MG  capsule TAKE 1 CAPSULE BY MOUTH EVERY DAY 7/2/24   Tanya Guthrie APRN   atorvastatin (LIPITOR) 40 MG tablet Take 1 tablet by mouth Daily. 1/12/22   Charo Alcaraz MD   fluticasone (FLONASE) 50 MCG/ACT nasal spray 2 sprays Daily As Needed. 9/22/21   Charo Alcaraz MD   hydroCHLOROthiazide 12.5 MG tablet TAKE 1 TABLET BY MOUTH EVERY DAY 6/25/24   Tanya Guthrie APRN   levothyroxine (SYNTHROID, LEVOTHROID) 50 MCG tablet TAKE 1 TABLET BY MOUTH EVERY DAY 6/25/24   Tanya Guthrie APRN   metFORMIN ER (GLUCOPHAGE-XR) 500 MG 24 hr tablet TAKE 2 TABLETS BY MOUTH DAILY WITH BREAKFAST. 1/13/25   Tanya Guthrie APRN   methylPREDNISolone (MEDROL) 4 MG dose pack Take as directed on package instructions.  Patient not taking: Reported on 2/12/2025 12/16/24   Tanya Guthrie APRN   metoprolol succinate XL (TOPROL-XL) 100 MG 24 hr tablet TAKE 1 TABLET BY MOUTH EVERY DAY 5/28/24   Jose Cuellar MD   Nortrel 7/7/7 0.5/0.75/1-35 MG-MCG per tablet TAKE 1 TABLET BY MOUTH EVERY DAY 4/19/24   Familia Edwards APRN   pantoprazole (PROTONIX) 40 MG EC tablet TAKE 1 TABLET BY MOUTH EVERY DAY 6/26/24   Tanya Guthrie APRN   Semaglutide-Weight Management (Wegovy) 2.4 MG/0.75ML solution auto-injector Inject 2.4 mg under the skin into the appropriate area as directed 1 (One) Time Per Week. 3/8/24   Tanya Guthrie APRN   sertraline (ZOLOFT) 50 MG tablet TAKE 1 TABLET BY MOUTH EVERY DAY 12/16/24   Tanya Guthrie APRN   spironolactone (ALDACTONE) 25 MG tablet TAKE 1 TABLET BY MOUTH TWICE A DAY 6/25/24   Tanya Guthrie APRN       Patient has no known allergies.        Objective     Vitals:   /87 (BP Location: Left arm, Patient Position: Sitting)   Pulse 99   Temp 97.6 °F (36.4 °C) (Temporal)   Resp 18   SpO2 99%     Physical Exam    General: Awake, alert, no acute distress  HEENT: Head is normocephalic, atraumatic.  No gross  visual or auditory acuity deficits.  Respiratory: Nonlabored  Cardiovascular: Regular rate  Left upper extremity: No open skin wounds or lesions.  Stable neurovascular exam to the left hand.  Psychiatric: Calm and cooperative  Neurologic: Alert and oriented x3         Assessment / Plan     Brief Patient Summary:  Dayana Leavitt is a 29 y.o. female who presents today for planned left open carpal tunnel release.    Active Hospital Problems:  There are no active hospital problems to display for this patient.    Plan:   See above      Cruz Hills MD

## 2025-02-19 NOTE — OP NOTE
Patient Name: Manuel  : 1995  MRN: [id number]      DATE of SURGERY: 2025    SURGEON: Cruz Hills MD    ASSISTANT: NONE    PREOPERATIVE DIAGNOSIS    Left carpal tunnel syndrome.       POSTOPERATIVE DIAGNOSIS    Left carpal tunnel syndrome.       PROCEDURE PERFORMED    Left open carpal tunnel release.       IMPLANTS  None.       ANESTHESIA    Monitored anesthesia care with local anesthetic      OPERATIVE INDICATIONS    The patient is a 29 y.o. female who presented to my clinic with complaints of numbness and tingling in the hand with clinical exam and neurodiagnostic evidence of carpal tunnel syndrome.  The patient wished to proceed with surgery, understanding the risks, benefits, and alternatives.  Conservative treatment failed to improve symptoms.  The risks include, but are not limited to, that of anesthesia, bleeding, infection, pain, damage to the motor and sensory branch of the median nerve, chronic pillar pain, incomplete resolution of symptoms.       ESTIMATED BLOOD LOSS    Less than 5 mL.       SPECIMENS    None.       DRAINS  None.       COMPLICATIONS    None.       PROCEDURE IN DETAIL    The patient was met in the preoperative holding room where the correct patient, procedure and side were confirmed.  The operative site was marked by myself with the patient's agreement.  The consent was signed by myself.  Patient was transferred to the operating room, and a formal timeout was performed identifying the correct patient and the operative site. The tourniquet was then placed on the brachium of the operative extremity. A sterile prep and drape was performed.       A skin marker was used to delineate an approximately 2 cm incision in line with the radial aspect of the fourth ray beginning proximal to Wolfe's cardinal line and ending distal to the wrist flexion crease.  Prior to inflation of the tourniquet, a local block was administered with 1% lidocaine with epinephrine. Approximately 5cc  were injected at the wrist flexion crease creating a subcutaneous wheal and the remaining 5cc injected in line with the proposed incision. The operative extremity was then exsanguinated with an Esmarch and the tourniquet was inflated to 250 mmHg for less than 10 minutes.  A 15 blade scalpel was used to make a longitudinal incision only through the skin. Soft tissue was dissected in line with the incision through the palmar fascia. The transverse carpal ligament was identified and incised until the carpal tunnel contents were identified.  A distal release was performed first utilizing deep knife and blunt tipped scissor dissection until the palmar fat pad was identified. The proximal portion of the transverse carpal ligament was released in a similar fashion. Finally, a pair of blunt-tipped scissors was inserted with the points directed toward the superficial and ulnar aspect of the wrist to protect the palmar cutaneous branch of the median nerve and the distal forearm fascia was incised.  The release was then inspected both visually and on palpation with no additional constriction points appreciated.  The median nerve was inspected and found to be intact with no significant hypertrophic tenosynovium or soft tissue masses noted.  The tourniquet was then deflated hemostasis was obtained with bipolar electrocautery.  The incision was irrigated and the skin closed with 4-0 nylon sutures.  A soft dressing was placed, the patient was awakened from anesthesia and transported to the recovery room in stable condition. All counts at the end of the procedure were correct. Patient tolerated the procedure well and was without complication.      POSTOPERATIVE PLAN  Discharge home, return to clinic in 2 weeks for suture removal and activity as tolerated. No lifting heavier than 5 pounds for 2 weeks.

## 2025-02-19 NOTE — ANESTHESIA PREPROCEDURE EVALUATION
Anesthesia Evaluation     no history of anesthetic complications:   NPO Solid Status: > 8 hours  NPO Liquid Status: > 8 hours           Airway   Mallampati: II  No difficulty expected  Dental      Pulmonary    (+) ,sleep apnea  Cardiovascular   Exercise tolerance: good (4-7 METS)    (+) hypertension, hyperlipidemia  (-) CAD      Neuro/Psych  (+) psychiatric history Anxiety and ADHD  (-) seizures, TIA, CVA  GI/Hepatic/Renal/Endo    (+) morbid obesity, GERD  (-) liver disease, no renal disease    Musculoskeletal     Abdominal    Substance History      OB/GYN          Other                    Anesthesia Plan    ASA 3     MAC     intravenous induction     Anesthetic plan, risks, benefits, and alternatives have been provided, discussed and informed consent has been obtained with: patient.    CODE STATUS:

## 2025-02-20 NOTE — ANESTHESIA POSTPROCEDURE EVALUATION
Patient: Dayana Leavitt    Procedure Summary       Date: 02/19/25 Room / Location:  [facility] 03 /  [facility]    Anesthesia Start: 0828 Anesthesia Stop: 0859    Procedure: LEFT WRIST CARPAL TUNNEL RELEASE (LEFT WRIST TENDON RELEASE) (Left: Wrist) Diagnosis: (G56.02)    Surgeons: Cruz Hills MD Provider: Johnnie Siegel CRNA    Anesthesia Type: MAC ASA Status: 3            Anesthesia Type: MAC    Vitals  Vitals Value Taken Time   /65 02/19/25 0931   Temp 97.2 °F (36.2 °C) 02/19/25 0858   Pulse 89 02/19/25 0934   Resp 16 02/19/25 0923   SpO2 96 % 02/19/25 0934   Vitals shown include unfiled device data.        Post Anesthesia Care and Evaluation    Patient location during evaluation: PACU  Patient participation: complete - patient participated  Level of consciousness: awake and alert  Pain management: adequate    Airway patency: patent  Anesthetic complications: No anesthetic complications    Cardiovascular status: acceptable  Respiratory status: acceptable  Hydration status: acceptable    Comments: Blood pressure 102/54, pulse 91, temperature 97.2 °F (36.2 °C), temperature source Temporal, resp. rate 16, last menstrual period 02/19/2025, SpO2 98%, not currently breastfeeding.    Pt discharged from PACU based on jose score >8

## 2025-03-01 DIAGNOSIS — E66.812 OBESITY, CLASS II, BMI 35-39.9: ICD-10-CM

## 2025-03-03 RX ORDER — SEMAGLUTIDE 2.4 MG/.75ML
2.4 INJECTION, SOLUTION SUBCUTANEOUS WEEKLY
Qty: 3 ML | Refills: 6 | Status: SHIPPED | OUTPATIENT
Start: 2025-03-03

## 2025-03-03 NOTE — TELEPHONE ENCOUNTER
Rx Refill Note  Requested Prescriptions     Pending Prescriptions Disp Refills    Wegovy 2.4 MG/0.75ML solution auto-injector [Pharmacy Med Name: WEGOVY 2.4 MG/0.75 ML PEN]  6     Sig: INJECT 2.4 MG UNDER THE SKIN INTO THE APPROPRIATE AREA AS DIRECTED 1 (ONE) TIME PER WEEK.      Last office visit with prescribing clinician: 12/16/2024   Last telemedicine visit with prescribing clinician: Visit date not found   Next office visit with prescribing clinician: 7/24/2025                         Would you like a call back once the refill request has been completed: [] Yes [] No    If the office needs to give you a call back, can they leave a voicemail: [] Yes [] No    Ana Edwards RN  03/03/25, 07:13 CST

## 2025-03-04 NOTE — SIGNIFICANT NOTE
Home Rx;  Wegovy; has not taken since 1st of January, 2025.  Protonix (pantoprazole); instructed to take, w/ sip of water the DOS-3/5/25.

## 2025-03-05 ENCOUNTER — HOSPITAL ENCOUNTER (OUTPATIENT)
Facility: HOSPITAL | Age: 30
Setting detail: HOSPITAL OUTPATIENT SURGERY
Discharge: HOME | End: 2025-03-05
Attending: ORTHOPAEDIC SURGERY | Admitting: ORTHOPAEDIC SURGERY
Payer: COMMERCIAL

## 2025-03-05 ENCOUNTER — ANESTHESIA EVENT (OUTPATIENT)
Dept: PERIOP | Facility: HOSPITAL | Age: 30
End: 2025-03-05
Payer: COMMERCIAL

## 2025-03-05 ENCOUNTER — ANESTHESIA (OUTPATIENT)
Dept: PERIOP | Facility: HOSPITAL | Age: 30
End: 2025-03-05
Payer: COMMERCIAL

## 2025-03-05 VITALS
HEIGHT: 65 IN | RESPIRATION RATE: 16 BRPM | TEMPERATURE: 97.8 F | DIASTOLIC BLOOD PRESSURE: 67 MMHG | BODY MASS INDEX: 45.11 KG/M2 | HEART RATE: 93 BPM | SYSTOLIC BLOOD PRESSURE: 118 MMHG | OXYGEN SATURATION: 96 % | WEIGHT: 270.73 LBS

## 2025-03-05 LAB — B-HCG UR QL: NEGATIVE

## 2025-03-05 PROCEDURE — 81025 URINE PREGNANCY TEST: CPT | Performed by: ORTHOPAEDIC SURGERY

## 2025-03-05 PROCEDURE — 25010000002 MIDAZOLAM PER 1MG: Performed by: ANESTHESIOLOGY

## 2025-03-05 PROCEDURE — 25810000003 LACTATED RINGERS PER 1000 ML: Performed by: ORTHOPAEDIC SURGERY

## 2025-03-05 PROCEDURE — 25010000002 CEFAZOLIN 3 G RECONSTITUTED SOLUTION 1 EACH VIAL: Performed by: ORTHOPAEDIC SURGERY

## 2025-03-05 PROCEDURE — 25010000002 PROPOFOL 200 MG/20ML EMULSION: Performed by: NURSE ANESTHETIST, CERTIFIED REGISTERED

## 2025-03-05 PROCEDURE — 25010000002 LIDOCAINE 1% - EPINEPHRINE 1:100000 1 %-1:100000 SOLUTION: Performed by: ORTHOPAEDIC SURGERY

## 2025-03-05 PROCEDURE — 25010000002 LIDOCAINE PF 2% 2 % SOLUTION: Performed by: NURSE ANESTHETIST, CERTIFIED REGISTERED

## 2025-03-05 PROCEDURE — 25010000002 FENTANYL CITRATE (PF) 100 MCG/2ML SOLUTION: Performed by: NURSE ANESTHETIST, CERTIFIED REGISTERED

## 2025-03-05 RX ORDER — FENTANYL CITRATE 50 UG/ML
50 INJECTION, SOLUTION INTRAMUSCULAR; INTRAVENOUS
Status: DISCONTINUED | OUTPATIENT
Start: 2025-03-05 | End: 2025-03-05 | Stop reason: HOSPADM

## 2025-03-05 RX ORDER — HYDROCODONE BITARTRATE AND ACETAMINOPHEN 10; 325 MG/1; MG/1
1 TABLET ORAL EVERY 4 HOURS PRN
Status: DISCONTINUED | OUTPATIENT
Start: 2025-03-05 | End: 2025-03-05 | Stop reason: HOSPADM

## 2025-03-05 RX ORDER — FENTANYL CITRATE 50 UG/ML
INJECTION, SOLUTION INTRAMUSCULAR; INTRAVENOUS AS NEEDED
Status: DISCONTINUED | OUTPATIENT
Start: 2025-03-05 | End: 2025-03-05 | Stop reason: SURG

## 2025-03-05 RX ORDER — NALOXONE HCL 0.4 MG/ML
0.4 VIAL (ML) INJECTION AS NEEDED
Status: DISCONTINUED | OUTPATIENT
Start: 2025-03-05 | End: 2025-03-05 | Stop reason: HOSPADM

## 2025-03-05 RX ORDER — SODIUM CHLORIDE 0.9 % (FLUSH) 0.9 %
3 SYRINGE (ML) INJECTION AS NEEDED
Status: DISCONTINUED | OUTPATIENT
Start: 2025-03-05 | End: 2025-03-05 | Stop reason: HOSPADM

## 2025-03-05 RX ORDER — PROPOFOL 10 MG/ML
INJECTION, EMULSION INTRAVENOUS AS NEEDED
Status: DISCONTINUED | OUTPATIENT
Start: 2025-03-05 | End: 2025-03-05 | Stop reason: SURG

## 2025-03-05 RX ORDER — ONDANSETRON 2 MG/ML
4 INJECTION INTRAMUSCULAR; INTRAVENOUS ONCE AS NEEDED
Status: DISCONTINUED | OUTPATIENT
Start: 2025-03-05 | End: 2025-03-05 | Stop reason: HOSPADM

## 2025-03-05 RX ORDER — LABETALOL HYDROCHLORIDE 5 MG/ML
5 INJECTION, SOLUTION INTRAVENOUS
Status: DISCONTINUED | OUTPATIENT
Start: 2025-03-05 | End: 2025-03-05 | Stop reason: HOSPADM

## 2025-03-05 RX ORDER — LIDOCAINE HYDROCHLORIDE 20 MG/ML
INJECTION, SOLUTION EPIDURAL; INFILTRATION; INTRACAUDAL; PERINEURAL AS NEEDED
Status: DISCONTINUED | OUTPATIENT
Start: 2025-03-05 | End: 2025-03-05 | Stop reason: SURG

## 2025-03-05 RX ORDER — SODIUM CHLORIDE 9 MG/ML
40 INJECTION, SOLUTION INTRAVENOUS AS NEEDED
Status: DISCONTINUED | OUTPATIENT
Start: 2025-03-05 | End: 2025-03-05 | Stop reason: HOSPADM

## 2025-03-05 RX ORDER — SODIUM CHLORIDE, SODIUM LACTATE, POTASSIUM CHLORIDE, CALCIUM CHLORIDE 600; 310; 30; 20 MG/100ML; MG/100ML; MG/100ML; MG/100ML
50 INJECTION, SOLUTION INTRAVENOUS CONTINUOUS
Status: DISCONTINUED | OUTPATIENT
Start: 2025-03-05 | End: 2025-03-05 | Stop reason: HOSPADM

## 2025-03-05 RX ORDER — MAGNESIUM HYDROXIDE 1200 MG/15ML
LIQUID ORAL AS NEEDED
Status: DISCONTINUED | OUTPATIENT
Start: 2025-03-05 | End: 2025-03-05 | Stop reason: HOSPADM

## 2025-03-05 RX ORDER — LIDOCAINE HYDROCHLORIDE AND EPINEPHRINE 10; 10 MG/ML; UG/ML
INJECTION, SOLUTION INFILTRATION; PERINEURAL AS NEEDED
Status: DISCONTINUED | OUTPATIENT
Start: 2025-03-05 | End: 2025-03-05 | Stop reason: HOSPADM

## 2025-03-05 RX ORDER — SODIUM CHLORIDE, SODIUM LACTATE, POTASSIUM CHLORIDE, CALCIUM CHLORIDE 600; 310; 30; 20 MG/100ML; MG/100ML; MG/100ML; MG/100ML
100 INJECTION, SOLUTION INTRAVENOUS CONTINUOUS
Status: DISCONTINUED | OUTPATIENT
Start: 2025-03-05 | End: 2025-03-05 | Stop reason: HOSPADM

## 2025-03-05 RX ORDER — FLUMAZENIL 0.1 MG/ML
0.2 INJECTION INTRAVENOUS AS NEEDED
Status: DISCONTINUED | OUTPATIENT
Start: 2025-03-05 | End: 2025-03-05 | Stop reason: HOSPADM

## 2025-03-05 RX ORDER — MIDAZOLAM HYDROCHLORIDE 2 MG/2ML
1 INJECTION, SOLUTION INTRAMUSCULAR; INTRAVENOUS
Status: DISCONTINUED | OUTPATIENT
Start: 2025-03-05 | End: 2025-03-05 | Stop reason: HOSPADM

## 2025-03-05 RX ORDER — IBUPROFEN 600 MG/1
600 TABLET, FILM COATED ORAL EVERY 6 HOURS PRN
Status: DISCONTINUED | OUTPATIENT
Start: 2025-03-05 | End: 2025-03-05 | Stop reason: HOSPADM

## 2025-03-05 RX ORDER — SODIUM CHLORIDE 0.9 % (FLUSH) 0.9 %
3-10 SYRINGE (ML) INJECTION AS NEEDED
Status: DISCONTINUED | OUTPATIENT
Start: 2025-03-05 | End: 2025-03-05 | Stop reason: HOSPADM

## 2025-03-05 RX ORDER — SODIUM CHLORIDE 0.9 % (FLUSH) 0.9 %
3 SYRINGE (ML) INJECTION EVERY 12 HOURS SCHEDULED
Status: DISCONTINUED | OUTPATIENT
Start: 2025-03-05 | End: 2025-03-05 | Stop reason: HOSPADM

## 2025-03-05 RX ORDER — HYDROCODONE BITARTRATE AND ACETAMINOPHEN 5; 325 MG/1; MG/1
1 TABLET ORAL EVERY 4 HOURS PRN
Status: DISCONTINUED | OUTPATIENT
Start: 2025-03-05 | End: 2025-03-05 | Stop reason: HOSPADM

## 2025-03-05 RX ORDER — LIDOCAINE HYDROCHLORIDE 10 MG/ML
0.5 INJECTION, SOLUTION EPIDURAL; INFILTRATION; INTRACAUDAL; PERINEURAL ONCE AS NEEDED
Status: DISCONTINUED | OUTPATIENT
Start: 2025-03-05 | End: 2025-03-05 | Stop reason: HOSPADM

## 2025-03-05 RX ADMIN — PROPOFOL 60 MG: 10 INJECTION, EMULSION INTRAVENOUS at 07:10

## 2025-03-05 RX ADMIN — LIDOCAINE HYDROCHLORIDE 60 MG: 20 INJECTION, SOLUTION EPIDURAL; INFILTRATION; INTRACAUDAL; PERINEURAL at 07:10

## 2025-03-05 RX ADMIN — FENTANYL CITRATE 100 MCG: 50 INJECTION, SOLUTION INTRAMUSCULAR; INTRAVENOUS at 07:08

## 2025-03-05 RX ADMIN — MIDAZOLAM HYDROCHLORIDE 1 MG: 1 INJECTION, SOLUTION INTRAMUSCULAR; INTRAVENOUS at 06:35

## 2025-03-05 RX ADMIN — PROPOFOL 150 MCG/KG/MIN: 10 INJECTION, EMULSION INTRAVENOUS at 07:11

## 2025-03-05 RX ADMIN — SODIUM CHLORIDE 3000 MG: 900 INJECTION INTRAVENOUS at 07:12

## 2025-03-05 RX ADMIN — PROPOFOL 20 MG: 10 INJECTION, EMULSION INTRAVENOUS at 07:24

## 2025-03-05 RX ADMIN — SODIUM CHLORIDE, POTASSIUM CHLORIDE, SODIUM LACTATE AND CALCIUM CHLORIDE 50 ML/HR: 600; 310; 30; 20 INJECTION, SOLUTION INTRAVENOUS at 06:18

## 2025-03-05 NOTE — OP NOTE
Patient Name: Manuel  : 1995  MRN: [id number]      DATE of SURGERY: 3/5/2025    SURGEON: Cruz Hills MD    ASSISTANT: Hermelindo Power    PREOPERATIVE DIAGNOSIS    Right carpal tunnel syndrome.       POSTOPERATIVE DIAGNOSIS    Right carpal tunnel syndrome.       PROCEDURE PERFORMED    Right open carpal tunnel release.       IMPLANTS  None.       ANESTHESIA    Monitored anesthesia care with local anesthetic      OPERATIVE INDICATIONS    The patient is a 29 y.o. female who presented to my clinic with complaints of numbness and tingling in the hand with clinical exam and neurodiagnostic evidence of carpal tunnel syndrome.  The patient wished to proceed with surgery, understanding the risks, benefits, and alternatives.  Conservative treatment failed to improve symptoms.  The risks include, but are not limited to, that of anesthesia, bleeding, infection, pain, damage to the motor and sensory branch of the median nerve, chronic pillar pain, incomplete resolution of symptoms.    Plan for suture removal in the left hand.     ESTIMATED BLOOD LOSS    Less than 5 mL.       SPECIMENS    None.       DRAINS  None.       COMPLICATIONS    None.       PROCEDURE IN DETAIL    The patient was met in the preoperative holding room where the correct patient, procedure and side were confirmed.  The operative site was marked by myself with the patient's agreement.  The consent was signed by myself.  Patient was transferred to the operating room, and a formal timeout was performed identifying the correct patient and the operative site. The tourniquet was then placed on the brachium of the operative extremity. A sterile prep and drape was performed.       A skin marker was used to delineate an approximately 2 cm incision in line with the radial aspect of the fourth ray beginning proximal to Wolfe's cardinal line and ending distal to the wrist flexion crease.  Prior to inflation of the tourniquet, a local block was  administered with 1% lidocaine with epinephrine. Approximately 3cc were injected at the wrist flexion crease creating a subcutaneous wheal and the remaining 7 cc injected in line with the proposed incision. The operative extremity was then exsanguinated with an Esmarch and the tourniquet was inflated to 250 mmHg for less than 10 minutes.  A 15 blade scalpel was used to make a longitudinal incision only through the skin. Soft tissue was dissected in line with the incision through the palmar fascia. The transverse carpal ligament was identified and incised until the carpal tunnel contents were identified.  A distal release was performed first utilizing deep knife and blunt tipped scissor dissection until the palmar fat pad was identified. The proximal portion of the transverse carpal ligament was released in a similar fashion. Finally, a pair of blunt-tipped scissors was inserted with the points directed toward the superficial and ulnar aspect of the wrist to protect the palmar cutaneous branch of the median nerve and the distal forearm fascia was incised.  The release was then inspected both visually and on palpation with no additional constriction points appreciated.  The median nerve was inspected and found to be intact with no significant hypertrophic tenosynovium or soft tissue masses noted.  The tourniquet was then deflated hemostasis was obtained with bipolar electrocautery.  The incision was irrigated and the skin closed with 4-0 nylon sutures.  A soft dressing was placed.  Prior to reversal of anesthesia, sutures were removed from the left hand.  Surgical incision was well-approximated.  No clinical sign of infection. The patient was awakened from anesthesia and transported to the recovery room in stable condition. All counts at the end of the procedure were correct. Patient tolerated the procedure well and was without complication.      POSTOPERATIVE PLAN  Discharge home, return to clinic in 2 weeks for  suture removal and activity as tolerated. No lifting heavier than 5 pounds for 2 weeks.

## 2025-03-05 NOTE — H&P
[facility]   HISTORY AND PHYSICAL    Patient Name: Dayana Leavitt  : 1995  MRN: [id number]  Primary Care Physician:  Tanya Guthrie APRN  Date of admission: 3/5/2025      Subjective     Chief Complaint: Right hand numbness and tingling    History of Present Illness  Gloria is a 29-year-old female who presents today for planned staged right open carpal tunnel release.  She underwent left open carpal tunnel release 2 weeks ago and has done reasonably well.  She describes some discomfort along the thenar eminence with activity.  Numbness and tingling have improved.  Symptoms on the right remains stable.    Review of Systems   Complete review of systems was reviewed today and is unremarkable except for HPI.    Personal History     Past Medical History:   Diagnosis Date    ADHD (attention deficit hyperactivity disorder)     Anxiety     Asthma     HAD AS A CHILD, NO PROBLEMS AS ADULT    Carpal tunnel syndrome 2025    BILAT    Elevated cholesterol     GERD (gastroesophageal reflux disease)     Hyperlipidemia     Hypertension     Hypothyroidism        Past Surgical History:   Procedure Laterality Date    CARPAL TUNNEL RELEASE Left 2025    Procedure: LEFT WRIST CARPAL TUNNEL RELEASE (LEFT WRIST TENDON RELEASE);  Surgeon: Cruz Hills MD;  Location: [facility];  Service: Orthopedics;  Laterality: Left;    LAPAROSCOPIC OVARIAN CYSTECTOMY         Family History   Problem Relation Age of Onset    Depression Mother     Diabetes Mother     Hyperlipidemia Mother     No Known Problems Father     Heart disease Maternal Grandfather        Social History     Socioeconomic History    Marital status: Single   Tobacco Use    Smoking status: Never    Smokeless tobacco: Never   Vaping Use    Vaping status: Never Used   Substance and Sexual Activity    Alcohol use: Never    Drug use: Never    Sexual activity: Defer       Prior to Admission medications    Medication Sig Start Date End Date Taking? Authorizing  Provider   albuterol sulfate  (90 Base) MCG/ACT inhaler Inhale 2 puffs Every 4 (Four) Hours As Needed for Wheezing. 3/19/24  Yes Tanya Guthrie APRN   atomoxetine (STRATTERA) 40 MG capsule TAKE 1 CAPSULE BY MOUTH EVERY DAY  Patient taking differently: Take 1 capsule by mouth Daily. 7/2/24  Yes Tanya Guthrie APRN   atorvastatin (LIPITOR) 40 MG tablet Take 1 tablet by mouth Daily. 1/12/22  Yes ProviderCharo MD   fluticasone (FLONASE) 50 MCG/ACT nasal spray Administer 2 sprays into the nostril(s) as directed by provider Daily As Needed for Rhinitis or Allergies. 9/22/21  Yes ProviderCharo MD   hydroCHLOROthiazide 12.5 MG tablet TAKE 1 TABLET BY MOUTH EVERY DAY  Patient taking differently: Take 1 tablet by mouth Daily. 6/25/24  Yes Tanya Guthrie APRN   levothyroxine (SYNTHROID, LEVOTHROID) 50 MCG tablet TAKE 1 TABLET BY MOUTH EVERY DAY  Patient taking differently: Take 1 tablet by mouth Every Morning. 6/25/24  Yes Tanya Guthrie APRN   metFORMIN ER (GLUCOPHAGE-XR) 500 MG 24 hr tablet TAKE 2 TABLETS BY MOUTH DAILY WITH BREAKFAST. 1/13/25  Yes Tanya Guthrie APRN   metoprolol succinate XL (TOPROL-XL) 100 MG 24 hr tablet TAKE 1 TABLET BY MOUTH EVERY DAY  Patient taking differently: Take 1 tablet by mouth Every Night. 5/28/24  Yes Jose Cuellar MD   Nortrel 7/7/7 0.5/0.75/1-35 MG-MCG per tablet TAKE 1 TABLET BY MOUTH EVERY DAY  Patient taking differently: Take 1 tablet by mouth Daily.  - BCP - 4/19/24  Yes Familia Edwards APRN   pantoprazole (PROTONIX) 40 MG EC tablet TAKE 1 TABLET BY MOUTH EVERY DAY  Patient taking differently: Take 1 tablet by mouth Every Morning. 6/26/24  Yes Tanya Guthrie APRN   sertraline (ZOLOFT) 50 MG tablet TAKE 1 TABLET BY MOUTH EVERY DAY  Patient taking differently: Take 1 tablet by mouth Daily. 12/16/24  Yes Guthrie, Tanya Nidia, APRN   spironolactone (ALDACTONE) 25 MG tablet TAKE 1 TABLET BY  "MOUTH TWICE A DAY  Patient taking differently: Take 1 tablet by mouth 2 (Two) Times a Day. 6/25/24  Yes Tanya Guthrie APRN   Semaglutide-Weight Management (Wegovy) 2.4 MG/0.75ML solution auto-injector INJECT 2.4 MG UNDER THE SKIN INTO THE APPROPRIATE AREA AS DIRECTED 1 (ONE) TIME PER WEEK.  Patient not taking: Reported on 3/4/2025 3/3/25   Tanya Guthrie APRN       Patient has no known allergies.        Objective     Vitals:   /82 (BP Location: Left arm, Patient Position: Sitting)   Pulse 92   Temp 97.5 °F (36.4 °C) (Temporal)   Resp 16   Ht 166 cm (65.35\")   Wt 123 kg (270 lb 11.6 oz)   LMP 02/19/2025 (Exact Date)   SpO2 99%   BMI 44.56 kg/m²     Physical Exam    General: Awake, alert, no acute distress  HEENT: Head is normocephalic, atraumatic.  No gross visual or auditory acuity deficits.  Respiratory: Nonlabored  Cardiovascular: Regular rate  Bilateral upper extremities: Surgical incision on the left hand is well-approximated with sutures in place.  No open skin wounds or lesions of the right hand.  Stable neurovascular exam to bilateral hands.  Psychiatric: Calm and cooperative  Neurologic: Alert and oriented x3         Assessment / Plan     Brief Patient Summary:  Dayana Leavitt is a 29 y.o. female who presents with plan for right open carpal tunnel release.    Active Hospital Problems:  There are no active hospital problems to display for this patient.    Plan:   See above      Cruz Hills MD   "

## 2025-03-05 NOTE — DISCHARGE INSTRUCTIONS
1.  Elevate operative upper extremity.  Encourage gentle finger range of motion.  2.  No lifting greater than 5-10 pounds with the operative hand until follow-up.  Activities as tolerated with the left hand.  3.  May remove dressing on postoperative day 3 and begin gentle hand hygiene.  Refrain from soaking the wound in water or applying any lotions or ointments.  4.  Pain medication as prescribed.  No additional Tylenol, alcohol or driving while on opioid pain medication.  Wean off pain medication as able.  5.  Return to clinic in 2 weeks for wound check and likely suture removal.  6.  Call our clinic number in the interim with any questions or concerns.

## 2025-03-05 NOTE — ANESTHESIA POSTPROCEDURE EVALUATION
Patient: Dayana Leavitt    Procedure Summary       Date: 03/05/25 Room / Location: [facility] OR  /  [facility] OR    Anesthesia Start: 0708 Anesthesia Stop: 0740    Procedure: RIGHT WRIST CARPAL TUNNEL RELEASE *REMOVE SUTURES ON LEFT*  (RIGHT WRIST NERVE RELEASE) (Right: Wrist) Diagnosis: (G56.01)    Surgeons: Cruz Hills MD Provider: Nadeem Valeljo CRNA    Anesthesia Type: MAC ASA Status: 3            Anesthesia Type: MAC    Vitals  Vitals Value Taken Time   /70 03/05/25 0751   Temp 97.8 °F (36.6 °C) 03/05/25 0742   Pulse 98 03/05/25 0754   Resp 16 03/05/25 0742   SpO2 96 % 03/05/25 0754   Vitals shown include unfiled device data.        Post Anesthesia Care and Evaluation    Patient location during evaluation: PHASE II  Patient participation: complete - patient participated  Level of consciousness: awake and alert  Pain score: 0  Pain management: adequate    Airway patency: patent  Anesthetic complications: No anesthetic complications  PONV Status: none  Cardiovascular status: acceptable  Respiratory status: acceptable  Hydration status: acceptable

## 2025-03-05 NOTE — ANESTHESIA PREPROCEDURE EVALUATION
Anesthesia Evaluation     Patient summary reviewed and Nursing notes reviewed   no history of anesthetic complications:   NPO Solid Status: > 8 hours  NPO Liquid Status: > 8 hours           Airway   Mallampati: II  No difficulty expected  Dental      Pulmonary    (+) asthma,  Cardiovascular   Exercise tolerance: good (4-7 METS)    (+) hypertension, hyperlipidemia  (-) CAD      Neuro/Psych- negative ROS  (-) seizures, TIA, CVA  GI/Hepatic/Renal/Endo    (+) morbid obesity, GERD, thyroid problem hypothyroidism  (-) liver disease, no renal disease    Musculoskeletal (-) negative ROS    Abdominal    Substance History - negative use     OB/GYN negative ob/gyn ROS         Other                    Anesthesia Plan    ASA 3     MAC     intravenous induction     Anesthetic plan, risks, benefits, and alternatives have been provided, discussed and informed consent has been obtained with: patient.    CODE STATUS:

## 2025-03-07 ENCOUNTER — TELEPHONE (OUTPATIENT)
Age: 30
End: 2025-03-07

## 2025-03-07 NOTE — TELEPHONE ENCOUNTER
Sent a message to pt via [employer] informing her she is able to return to work with lifting and pulling restrictions until sutures are removed. All questions were answered at this time

## 2025-03-19 RX ORDER — NORETHINDRONE AND ETHINYL ESTRADIOL 7 DAYS X 3
1 KIT ORAL DAILY
Qty: 84 TABLET | Refills: 3 | Status: SHIPPED | OUTPATIENT
Start: 2025-03-19

## 2025-03-19 NOTE — TELEPHONE ENCOUNTER
Rx Refill Note  Requested Prescriptions     Pending Prescriptions Disp Refills    Nortrel 7/7/7 0.5/0.75/1-35 MG-MCG per tablet [Pharmacy Med Name: NORTREL 7-7-7-28 TABLET] 84 tablet 3     Sig: TAKE 1 TABLET BY MOUTH EVERY DAY      Last office visit with prescribing clinician: 12/16/2024   Last telemedicine visit with prescribing clinician: Visit date not found   Next office visit with prescribing clinician: 7/24/2025                         Would you like a call back once the refill request has been completed: [] Yes [] No    If the office needs to give you a call back, can they leave a voicemail: [] Yes [] No    Ana Edwards RN  03/19/25, 07:04 CDT

## 2025-03-20 NOTE — PROGRESS NOTES
Allergies  No Known Allergies     Review of Systems  System  Neg/Pos  Details  Constitutional  Negative  Chills, Fatigue, Fever and Night Sweats  Respiratory  Negative  Chest Pain, Cough and Dyspnea  Cardio   Negative  Leg Swelling  GI   Negative  Abdominal Pain, Constipation, Nausea and Vomiting     Negative  Urinary Incontinence   Endocrine  Negative  Weight Gain and Weight Loss  MS   Negative  Except as noted in HPI and Chief Complaint    There were no vitals taken for this visit.     Physical Exam   Physical Examination:  General: The patient is a Well Nourished 29 y.o. female who is calm, no acute distress.  Psychological: Appropriate mood and affect  HEENT: Normocephalic, Atraumatic. No gross visual or auditory acuity deficits.   Respiratory: Rate and effort within normal limits.   Vascular: Capillary refill <3 seconds to extremities  Musculoskeletal: Bilateral upper extremities: Surgical incisions are closed with minimal surrounding edema, no significant erythema or ecchymosis.  No active bleeding or drainage.  Able to make composite fists bilaterally.  Stable neurovascular exam to bilateral hands.    Imaging  None      Plan    Kajal is a 29 y.o. female who returns for follow-up after staged bilateral carpal tunnel releases, doing well.  Sutures were removed today from the right side.  May gradually proceed with activities as tolerated bilaterally.  May begin scar massage in 2 days.  Discussed follow-up today and they feel comfortable returning on an as-needed basis.        This dictation was generated by voice recognition computer software. Although all attempts are made to edit the dictation for accuracy, there may be errors in the transcription that are not intended.    Electronically signed by Rissa Mina MA on 3/20/2025 at 10:34 AM

## 2025-03-21 ENCOUNTER — OFFICE VISIT (OUTPATIENT)
Age: 30
End: 2025-03-21

## 2025-03-21 VITALS — BODY MASS INDEX: 36.88 KG/M2 | HEIGHT: 64 IN | WEIGHT: 216 LBS

## 2025-03-21 DIAGNOSIS — G56.01 RIGHT CARPAL TUNNEL SYNDROME: Primary | ICD-10-CM

## 2025-03-21 PROCEDURE — 99024 POSTOP FOLLOW-UP VISIT: CPT | Performed by: ORTHOPAEDIC SURGERY

## 2025-03-21 RX ORDER — METFORMIN HYDROCHLORIDE 500 MG/1
2 TABLET, EXTENDED RELEASE ORAL
COMMUNITY
Start: 2025-01-13

## 2025-05-07 ENCOUNTER — E-VISIT (OUTPATIENT)
Dept: FAMILY MEDICINE CLINIC | Facility: TELEHEALTH | Age: 30
End: 2025-05-07

## 2025-05-07 NOTE — E-VISIT TREATED
Date: 2025 10:51:50  Clinician: Tala Hernandez  Clinician NPI: [id number]  Patient: Dayana Leavitt  Patient : 1995  Patient Address: [address] [place] [address]  Patient Phone: [phone]  Visit Protocol: Low back pain  Patient Summary:  Dayana is a 29 year old ( : 1995 ) female who initiated a visit for evaluation of low back pain.     The back pain began suddenly 1-6 days ago. The pain is present on both sides and is located in the low back area.   The pain   is constant.   Dayana is able to walk on toes and is able to walk on heels with toes lifted off the ground.    Symptom Details   Pain: The pain is moderate (4-6 on a 10 point pain scale).    Dayana denies loss of bladder control, leg weakness, feeling   feverish, abdominal pain, back muscle spasms, vomiting, loss of bowel control, a rash in the same area as the back pain, shooting pain, numbness or tingling in the legs, urinary retention, saddle anesthesia, urinary frequency, dysuria, and hematuria. The   pain does not decrease when bending forward.   Precipitating events  The back pain did not begin in response to an injury that happened at work. Dayana doesn't know what caused the back pain.   Pertinent medical history   Dayana has not had similar   episodes of back pain in the past. Dayana has not had cancer, unintended weight loss in the last three months, back surgery, and kidney stones.   Treatments  Dayana has not seen a provider to treat this episode of low back pain.   Dayana has not been   told by provider to avoid NSAIDs.   Dayana tried using over-the-counter medications (such as ibuprofen, aspirin, Tylenol) to manage this episode of low back pain. Dayana has not tried using prescription medications and therapeutic remedies (such as cold   pack, heat, chiropractic treatment, physical therapy) to manage this episode of low back pain.    Medication used to manage this episode of low back pain as reported by the  patient (free text): Ibuprofen and Tylenol      Dayana denies pregnancy and denies breastfeeding.   Dayana does not smoke or use smokeless tobacco.   Dayana does not vape or use other e-cigarette products.     MEDICATIONS: Nortrel 7/7/7 (28) oral, sertraline oral, Wegovy subcutaneous, metformin oral, spironolactone oral, metoprolol succinate oral, pantoprazole oral, albuterol sulfate inhalation, methylprednisolone sod succ (PF) injection, fluticasone   propionate nasal, penicillin V potassium oral, ketorolac intramuscular, hydrochlorothiazide oral, ALLERGIES: NKDA  Clinician Response:  Dear Dayana,  Based on the information you provided, you have  Mechanical Low Back Pain.   Low back pain that is caused by placing abnormal stress and muscle or soft tissue strain (also known as mechanical low back pain) is the most   common form of back pain. The majority of cases are not related with a specific disease or abnormal structure of the spine and do not require diagnostic imaging (such as an X-ray, MRI or CAT scan). Heating, cooling, back exercises and stretches should   reduce your back pain within 3-4 weeks. During this time, remain active.   Medication information  I am prescribing:     Methocarbamol (Robaxin-750) 750 mg oral tablet. Take 2 tablets by mouth every 6 hours for 2 days, then 1 tablet every 6 hours for 5 days. There are no refills with this prescription.   Self care  The following tips will help prevent and reduce back pain:       Apply heating pads on the sore area for a short duration (10 minutes per hour and as needed). A hot bath or a heating pad on your lower back may also help reduce pain and stiffness.    Maintaining a good posture reduces stress on the back muscles. To help reduce the stress on your back:    Stand and sit up straight.    Try not to slouch when standing or sitting.    Try not to stay in the same position for a long time.    Switch positions every 20 to 30 minutes if possible.    When  lifting heavy objects, squat down and use your legs rather than bending at the waist.          Stress can make your back pain worse. For this reason, take time to relax and try some relaxation techniques when you feel stressed.    Click the following link for more information: Prevent back pain.     If you have a history of fracture or osteoporosis, follow up with your primary care provider to find out whether your low back pain is caused by the fracture or osteoporosis.  When to seek care  Please make an appointment to be seen in a clinic or   urgent care if any of the following occur:     Pain that doesn't seem to be getting better after 3 to 4 weeks    You develop new symptoms or your symptoms becomes worse    A painful rash that appears as a stripe along one side of the body    Unexplained fever    Unbearable pain    Unrelenting night pain     Seek immediate medical care if you have problems with bowel or bladder control, worsening weakness or numbness in your legs, or numbness in the inner thighs, groin, or buttocks.   Diagnosis: Mechanical low back pain  Diagnosis ICD: M54.59    Follow up instructions:  ATTENTION: If you have been prescribed medications, your prescriptions will not be sent until you choose your pharmacy. To do so open the link within your notification, or go to [employer] and click eVisit in the menu to open your   treatment plan. From there, you can select your pharmacy at the bottom of your after visit summary. You can also go to [email or web address]   Prescriptions  Prescription: methylprednisolone (Medrol (Balwinder)) 4 mg oral tablets,dose pack, take tablets,dose pack by mouth for 6 days  Sent To: [facility] #[id number] - [id number] - [address] [place] [address]  Prescription: methocarbamol (Robaxin-750) 750 mg oral tablet, take 2 tablets by mouth every 6 hours for 2 days, then 1 tablet every 6 hours for 5 days  Sent To: [facility]/pharmacy #[id number] - [id number] - [address]  [facility],  [place] [address]

## 2025-05-15 RX ORDER — METOPROLOL SUCCINATE 100 MG/1
100 TABLET, EXTENDED RELEASE ORAL DAILY
Qty: 90 TABLET | Refills: 3 | Status: SHIPPED | OUTPATIENT
Start: 2025-05-15

## 2025-06-08 DIAGNOSIS — E03.9 HYPOTHYROIDISM, UNSPECIFIED: ICD-10-CM

## 2025-06-08 DIAGNOSIS — F33.1 MODERATE EPISODE OF RECURRENT MAJOR DEPRESSIVE DISORDER: ICD-10-CM

## 2025-06-09 RX ORDER — LEVOTHYROXINE SODIUM 50 UG/1
50 TABLET ORAL DAILY
Qty: 90 TABLET | Refills: 3 | Status: SHIPPED | OUTPATIENT
Start: 2025-06-09

## 2025-07-09 DIAGNOSIS — F98.8 OTHER SPECIFIED BEHAVIORAL AND EMOTIONAL DISORDERS WITH ONSET USUALLY OCCURRING IN CHILDHOOD AND ADOLESCENCE: ICD-10-CM

## 2025-07-09 DIAGNOSIS — E66.01 MORBID (SEVERE) OBESITY DUE TO EXCESS CALORIES: ICD-10-CM

## 2025-07-09 DIAGNOSIS — R73.03 PREDIABETES: ICD-10-CM

## 2025-07-09 RX ORDER — ATOMOXETINE 40 MG/1
40 CAPSULE ORAL DAILY
Qty: 90 CAPSULE | Refills: 3 | Status: SHIPPED | OUTPATIENT
Start: 2025-07-09

## 2025-07-09 RX ORDER — METFORMIN HYDROCHLORIDE 500 MG/1
1000 TABLET, EXTENDED RELEASE ORAL
Qty: 180 TABLET | Refills: 1 | Status: SHIPPED | OUTPATIENT
Start: 2025-07-09

## 2025-07-09 NOTE — TELEPHONE ENCOUNTER
Rx Refill Note  Requested Prescriptions     Pending Prescriptions Disp Refills    atomoxetine (STRATTERA) 40 MG capsule [Pharmacy Med Name: ATOMOXETINE HCL 40 MG CAPSULE] 90 capsule 3     Sig: TAKE 1 CAPSULE BY MOUTH EVERY DAY    metFORMIN ER (GLUCOPHAGE-XR) 500 MG 24 hr tablet [Pharmacy Med Name: METFORMIN HCL  MG TABLET] 180 tablet 1     Sig: TAKE 2 TABLETS BY MOUTH DAILY WITH BREAKFAST.      Last office visit with prescribing clinician: 12/16/2024   Last telemedicine visit with prescribing clinician: Visit date not found   Next office visit with prescribing clinician: 7/24/2025                         Would you like a call back once the refill request has been completed: [] Yes [] No    If the office needs to give you a call back, can they leave a voicemail: [] Yes [] No    Ana Edwards RN  07/09/25, 07:36 CDT

## 2025-07-25 ENCOUNTER — OFFICE VISIT (OUTPATIENT)
Dept: INTERNAL MEDICINE | Facility: CLINIC | Age: 30
End: 2025-07-25
Payer: COMMERCIAL

## 2025-07-25 VITALS
OXYGEN SATURATION: 98 % | SYSTOLIC BLOOD PRESSURE: 124 MMHG | WEIGHT: 280 LBS | DIASTOLIC BLOOD PRESSURE: 78 MMHG | TEMPERATURE: 97.5 F | HEIGHT: 65 IN | RESPIRATION RATE: 18 BRPM | HEART RATE: 103 BPM | BODY MASS INDEX: 46.65 KG/M2

## 2025-07-25 DIAGNOSIS — M25.572 ACUTE LEFT ANKLE PAIN: Primary | ICD-10-CM

## 2025-07-25 DIAGNOSIS — M79.672 LEFT FOOT PAIN: ICD-10-CM

## 2025-07-25 NOTE — PROGRESS NOTES
Adnois Scott MD  [facility]   Family Medicine  [address]  [place] [address]  Phone: [phone]  Fax: [phone]       Chief Complaint:  Chief Complaint   Patient presents with    Foot Pain     Left foot swelling.        History:  Dayana Leavitt is a 30 y.o. female that is an established patient. She  is here for the above problems.    HPI:  History of Present Illness  The patient presents for evaluation of left foot pain.    She reports experiencing swelling and pain in her left foot, which has been causing discomfort during walking for approximately a month. The onset of these symptoms was marked by a popping sound she heard while walking, although she did not initially associate it with any injury. The pain is localized to the side of her foot where the swelling is most pronounced and intensifies after prolonged periods of standing or walking. She has no history of previous injuries to the same foot or ankle. Her job involves a significant amount of walking, but she has not engaged in any additional physical activities such as gym workouts or walking plans recently. She has been managing the pain with over-the-counter medications like ibuprofen and Tylenol, which provide temporary relief. She has not taken any antibiotics since 03/2025. She does not require a work note today. Her primary care provider is Tanya. The last orthopedist she saw was Dr. Hills, but that was for her hands.            reports that she has never smoked. She has never used smokeless tobacco. She reports that she does not drink alcohol and does not use drugs.    Current Outpatient Medications   Medication Instructions    albuterol sulfate  (90 Base) MCG/ACT inhaler 2 puffs, Inhalation, Every 4 Hours PRN    atomoxetine (STRATTERA) 40 mg, Oral, Daily    atorvastatin (LIPITOR) 40 mg, Daily    fluticasone (FLONASE) 50 MCG/ACT nasal spray 2 sprays, Daily PRN    hydroCHLOROthiazide 12.5 MG tablet TAKE 1  "TABLET BY MOUTH EVERY DAY    levothyroxine (SYNTHROID, LEVOTHROID) 50 mcg, Oral, Daily    metFORMIN ER (GLUCOPHAGE-XR) 1,000 mg, Oral, Daily With Breakfast    metoprolol succinate XL (TOPROL-XL) 100 mg, Oral, Daily    Nortrel 7/7/7 0.5/0.75/1-35 MG-MCG per tablet 1 tablet, Oral, Daily    pantoprazole (PROTONIX) 40 MG EC tablet TAKE 1 TABLET BY MOUTH EVERY DAY    sertraline (ZOLOFT) 50 mg, Oral, Daily    spironolactone (ALDACTONE) 25 MG tablet TAKE 1 TABLET BY MOUTH TWICE A DAY    Wegovy 2.4 mg, Subcutaneous, Weekly       OBJECTIVE:  /78   Pulse 103   Temp 97.5 °F (36.4 °C)   Resp 18   Ht 166 cm (65.35\")   Wt 127 kg (280 lb)   SpO2 98%   BMI 46.10 kg/m²    Physical Exam  Musculoskeletal:      Left ankle: Tenderness present over the ATF ligament. Decreased range of motion.      Left Achilles Tendon: Normal.      Left foot: Decreased range of motion. Tenderness present.       Physical Exam  Extremities: Swelling and tenderness noted on the left foot, particularly on the side and ankle. No pain noted in the posterior aspect of the foot.    Results Reviewed:                Glucose   Date Value Ref Range Status   02/12/2025 98 65 - 99 mg/dL Final   03/13/2020 82 74 - 109 mg/dL Final     BUN   Date Value Ref Range Status   02/12/2025 12 6 - 20 mg/dL Final   03/13/2020 16 6 - 20 mg/dL Final     Creatinine   Date Value Ref Range Status   02/12/2025 0.56 (L) 0.57 - 1.00 mg/dL Final   03/13/2020 0.7 0.5 - 0.9 mg/dL Final     Sodium   Date Value Ref Range Status   02/12/2025 133 (L) 136 - 145 mmol/L Final   03/13/2020 139 136 - 145 mmol/L Final     Potassium   Date Value Ref Range Status   02/12/2025 4.5 3.5 - 5.2 mmol/L Final   03/13/2020 4.1 3.5 - 5.0 mmol/L Final     Chloride   Date Value Ref Range Status   02/12/2025 99 98 - 107 mmol/L Final   03/13/2020 100 98 - 111 mmol/L Final     CO2   Date Value Ref Range Status   02/12/2025 23.0 22.0 - 29.0 mmol/L Final   03/13/2020 23 22 - 29 mmol/L Final     Calcium "   Date Value Ref Range Status   02/12/2025 9.2 8.6 - 10.5 mg/dL Final   03/13/2020 9.8 8.6 - 10.0 mg/dL Final     ALT (SGPT)   Date Value Ref Range Status   07/23/2024 23 0 - 32 IU/L Final   03/13/2020 15 5 - 33 U/L Final     AST (SGOT)   Date Value Ref Range Status   07/23/2024 16 0 - 40 IU/L Final   03/13/2020 15 5 - 32 U/L Final     WBC   Date Value Ref Range Status   02/12/2025 8.74 3.40 - 10.80 10*3/mm3 Final   07/23/2024 6.9 3.4 - 10.8 x10E3/uL Final   03/13/2020 10.2 4.8 - 10.8 K/uL Final     Hematocrit   Date Value Ref Range Status   02/12/2025 36.6 34.0 - 46.6 % Final   03/13/2020 39.3 37.0 - 47.0 % Final     Platelets   Date Value Ref Range Status   02/12/2025 247 140 - 450 10*3/mm3 Final   03/13/2020 266 130 - 400 K/uL Final     Triglycerides   Date Value Ref Range Status   07/23/2024 170 (H) 0 - 149 mg/dL Final   07/26/2018 113 0 - 149 mg/dL Final     HDL Cholesterol   Date Value Ref Range Status   07/23/2024 69 >39 mg/dL Final   07/26/2018 66 65 - 121 mg/dL Final     Comment:     VALUES>60 MG/DL ARE ASSOCIATED WITH A DECREASED RISK OF  ATHEROSCLEROTIC CARDIOVASCULAR DISEASE     LDL Cholesterol    Date Value Ref Range Status   07/26/2018 66 <100 mg/dL Final     Comment:     <100 MG/DL=OPITIMAL    100-129 MG/DL=DESIRABLE    130-159 MG/DL BORDERLINE=INCREASED RISK OF ATHEROSCLEROTIC  CARDIOVASCULAR DISEASE    > OR = 160 MG/DL=ASSOCIATED WITH AN INCREASE RISK OF  ATHEROSCLEROTIC CARDIOVASCULAR DISEASE     LDL Chol Calc (NIH)   Date Value Ref Range Status   07/23/2024 131 (H) 0 - 99 mg/dL Final     Hemoglobin A1C   Date Value Ref Range Status   07/23/2024 5.4 4.8 - 5.6 % Final     Comment:              Prediabetes: 5.7 - 6.4           Diabetes: >6.4           Glycemic control for adults with diabetes: <7.0     04/04/2023 5.4 % Final       XR Foot 3+ View Left (In Office)    (Results Pending)   XR Ankle 3+ View Left (In Office)    (Results Pending)      Procedure   Procedures           An After Visit Summary  was printed and given to the patient at discharge.  Return for Annual physical.       There are no Patient Instructions on file for this visit.  Assessment/Plan:     Diagnoses and all orders for this visit:    1. Acute left ankle pain (Primary)  -     XR Ankle 3+ View Left (In Office)  -      Walking Boot, Pneumatic & / or Vacuum  -     Ambulatory Referral to Sports Med & Orthopedics (Non-Surgical)    2. Left foot pain  -     XR Foot 3+ View Left (In Office)  -      Walking Boot, Pneumatic & / or Vacuum  -     Ambulatory Referral to Sports Med & Orthopedics (Non-Surgical)        Assessment & Plan  1. Left foot pain.  - Symptoms suggest a possible ligament sprain in the left foot. Pain worsens after prolonged activity.  - Physical exam reveals significant soreness on the side of the foot with swelling. No major trauma reported.  - Discussed potential causes, including overuse and the impact of certain antibiotics, which were not recently taken. X-rays of the left ankle and foot ordered to rule out bone abnormalities.  - Prescribed a walking boot for stabilization. Recommended Tylenol for long-term pain management, with ibuprofen as an alternative if needed for sleep. Referral to a non-surgical orthopedist for further evaluation and potential physical therapy.          Discussion: Suspect patient has a ligament sprain, x-ray ordered walking boot and referral to sports medicine she may continue with her over-the-counter ibuprofen and Tylenol as needed     Adonis Scott MD.7/25/2025      Electronically signed.  Patient or patient representative verbalized consent for the use of Ambient Listening during the visit with  Adonis Scott MD for chart documentation. 7/25/2025  08:33 CDT  EMR Dictation/Transcription disclaimer:   Some of this note may be an electronic transcription/translation of spoken language to printed text. The electronic translation of spoken language may permit erroneous, or at times,  nonsensical words or phrases to be inadvertently transcribed; Although I have reviewed the note for such errors, some may still exist.

## 2025-08-04 ENCOUNTER — OFFICE VISIT (OUTPATIENT)
Age: 30
End: 2025-08-04
Payer: COMMERCIAL

## 2025-08-04 VITALS — HEIGHT: 65 IN | WEIGHT: 280 LBS | BODY MASS INDEX: 46.65 KG/M2

## 2025-08-04 DIAGNOSIS — M19.072 ARTHRITIS OF LEFT MIDFOOT: ICD-10-CM

## 2025-08-04 DIAGNOSIS — M76.72 PERONEAL TENDINITIS OF LEFT LOWER LEG: Primary | ICD-10-CM

## 2025-08-04 RX ORDER — METHYLPREDNISOLONE 4 MG/1
TABLET ORAL
Qty: 21 TABLET | Refills: 0 | Status: SHIPPED | OUTPATIENT
Start: 2025-08-04

## 2025-08-05 ENCOUNTER — PATIENT ROUNDING (BHMG ONLY) (OUTPATIENT)
Age: 30
End: 2025-08-05
Payer: COMMERCIAL

## 2025-08-16 DIAGNOSIS — L68.0 HIRSUTISM: ICD-10-CM

## 2025-08-17 DIAGNOSIS — I10 ESSENTIAL (PRIMARY) HYPERTENSION: ICD-10-CM

## 2025-08-18 RX ORDER — SPIRONOLACTONE 25 MG/1
25 TABLET ORAL 2 TIMES DAILY
Qty: 180 TABLET | Refills: 0 | Status: SHIPPED | OUTPATIENT
Start: 2025-08-18

## 2025-08-18 RX ORDER — HYDROCHLOROTHIAZIDE 12.5 MG/1
12.5 TABLET ORAL DAILY
Qty: 90 TABLET | Refills: 3 | Status: SHIPPED | OUTPATIENT
Start: 2025-08-18

## 2025-08-25 ENCOUNTER — OFFICE VISIT (OUTPATIENT)
Age: 30
End: 2025-08-25
Payer: COMMERCIAL

## 2025-08-25 VITALS — WEIGHT: 280 LBS | BODY MASS INDEX: 46.65 KG/M2 | HEIGHT: 65 IN

## 2025-08-25 DIAGNOSIS — M76.72 PERONEAL TENDINITIS OF LEFT LOWER LEG: ICD-10-CM

## 2025-08-25 DIAGNOSIS — M25.572 LEFT LATERAL ANKLE PAIN: Primary | ICD-10-CM

## 2025-08-25 DIAGNOSIS — M19.072 ARTHRITIS OF LEFT MIDFOOT: ICD-10-CM

## 2025-08-25 PROCEDURE — 99214 OFFICE O/P EST MOD 30 MIN: CPT | Performed by: STUDENT IN AN ORGANIZED HEALTH CARE EDUCATION/TRAINING PROGRAM

## (undated) DEVICE — 4-PORT MANIFOLD: Brand: NEPTUNE 2

## (undated) DEVICE — SUT ETHLN 4/0 FS2 18IN 662H

## (undated) DEVICE — GAUZE,SPONGE,4"X4",12PLY,STERILE,LF,2'S: Brand: MEDLINE

## (undated) DEVICE — PATIENT RETURN ELECTRODE, SINGLE-USE, CONTACT QUALITY MONITORING, ADULT, WITH 9FT CORD, FOR PATIENTS WEIGING OVER 33LBS. (15KG): Brand: MEGADYNE

## (undated) DEVICE — DRESSING,GAUZE,XEROFORM,CURAD,5"X9",ST: Brand: CURAD

## (undated) DEVICE — CABL BIPOL MEGADYNE 12FT DISP

## (undated) DEVICE — GLV SURG SENSICARE W/ALOE PF LF 7.5 STRL

## (undated) DEVICE — CVR BRD ARM 13X30

## (undated) DEVICE — INTENDED FOR TISSUE SEPARATION, AND OTHER PROCEDURES THAT REQUIRE A SHARP SURGICAL BLADE TO PUNCTURE OR CUT.: Brand: BARD-PARKER ® STAINLESS STEEL BLADES

## (undated) DEVICE — PK EXTRM 30

## (undated) DEVICE — BNDG ELAS ECON W/CLIP 3IN 5YD LF STRL

## (undated) DEVICE — GLV SURG DERMASSURE GRN LF PF 8.0

## (undated) DEVICE — DISPOSABLE TOURNIQUET CUFF 24"X4", 1-LINE, YELLOW, STERILE, 1EA/PK, 10PK/CS: Brand: ASP MEDICAL

## (undated) DEVICE — SYR LL TP 10ML STRL